# Patient Record
Sex: FEMALE | Race: BLACK OR AFRICAN AMERICAN | NOT HISPANIC OR LATINO | Employment: OTHER | URBAN - NONMETROPOLITAN AREA
[De-identification: names, ages, dates, MRNs, and addresses within clinical notes are randomized per-mention and may not be internally consistent; named-entity substitution may affect disease eponyms.]

---

## 2018-02-07 ENCOUNTER — TELEPHONE (OUTPATIENT)
Dept: NEUROSURGERY | Facility: CLINIC | Age: 68
End: 2018-02-07

## 2018-02-07 NOTE — TELEPHONE ENCOUNTER
Patient's son called stating his mom had fallen, broken her hip, was admitted to [facility], and was wanting Dr Phillips to admit her to [facility].  I called him back and told him he would need to discuss w/her attending doctor there about what the next step would be to get her hip taken care of and that Dr Phillips wouldn't accept her in transfer for a broken hip and she would need to talk to her doctor there.  He agreed.    bessie buchanan CMA

## 2018-08-29 ENCOUNTER — LAB REQUISITION (OUTPATIENT)
Dept: LAB | Facility: HOSPITAL | Age: 68
End: 2018-08-29

## 2018-08-29 DIAGNOSIS — Z00.00 ROUTINE GENERAL MEDICAL EXAMINATION AT A HEALTH CARE FACILITY: ICD-10-CM

## 2018-08-29 LAB — AMMONIA BLD-SCNC: 22 UMOL/L (ref 9–33)

## 2018-08-29 PROCEDURE — 82140 ASSAY OF AMMONIA: CPT | Performed by: EMERGENCY MEDICINE

## 2019-01-01 ENCOUNTER — APPOINTMENT (OUTPATIENT)
Dept: GENERAL RADIOLOGY | Age: 69
DRG: 215 | End: 2019-01-01
Payer: MEDICARE

## 2019-01-01 ENCOUNTER — HOSPITAL ENCOUNTER (INPATIENT)
Age: 69
LOS: 1 days | DRG: 215 | End: 2019-08-08
Attending: EMERGENCY MEDICINE | Admitting: INTERNAL MEDICINE
Payer: MEDICARE

## 2019-01-01 ENCOUNTER — APPOINTMENT (OUTPATIENT)
Dept: CT IMAGING | Age: 69
DRG: 215 | End: 2019-01-01
Payer: MEDICARE

## 2019-01-01 DIAGNOSIS — I21.3 ST ELEVATION MYOCARDIAL INFARCTION (STEMI), UNSPECIFIED ARTERY (HCC): ICD-10-CM

## 2019-01-01 DIAGNOSIS — I46.9 CARDIAC ARREST (HCC): Primary | ICD-10-CM

## 2019-01-01 DIAGNOSIS — R79.89 ELEVATED SERUM CREATININE: ICD-10-CM

## 2019-01-01 DIAGNOSIS — E87.5 HYPERKALEMIA: ICD-10-CM

## 2019-01-01 DIAGNOSIS — Z87.19 HISTORY OF CIRRHOSIS: ICD-10-CM

## 2019-01-01 LAB
ABO/RH: NORMAL
ALBUMIN SERPL-MCNC: 1.7 G/DL (ref 3.5–5.2)
ALBUMIN SERPL-MCNC: 1.7 G/DL (ref 3.5–5.2)
ALBUMIN SERPL-MCNC: 2.8 G/DL (ref 3.5–5.2)
ALP BLD-CCNC: 153 U/L (ref 35–104)
ALP BLD-CCNC: 175 U/L (ref 35–104)
ALP BLD-CCNC: 323 U/L (ref 35–104)
ALT SERPL-CCNC: 51 U/L (ref 5–33)
ALT SERPL-CCNC: 56 U/L (ref 5–33)
ALT SERPL-CCNC: 83 U/L (ref 5–33)
ANION GAP SERPL CALCULATED.3IONS-SCNC: 11 MMOL/L (ref 7–19)
ANION GAP SERPL CALCULATED.3IONS-SCNC: 11 MMOL/L (ref 7–19)
ANION GAP SERPL CALCULATED.3IONS-SCNC: 17 MMOL/L (ref 7–19)
ANION GAP SERPL CALCULATED.3IONS-SCNC: 8 MMOL/L (ref 7–19)
ANTIBODY SCREEN: NORMAL
APTT: 61.7 SEC (ref 26–36.2)
APTT: 77.6 SEC (ref 26–36.2)
APTT: >200 SEC (ref 26–36.2)
APTT: >200 SEC (ref 26–36.2)
AST SERPL-CCNC: 150 U/L (ref 5–32)
AST SERPL-CCNC: 153 U/L (ref 5–32)
AST SERPL-CCNC: 185 U/L (ref 5–32)
BACTERIA: ABNORMAL /HPF
BASE EXCESS ARTERIAL: -12.4 MMOL/L (ref -2–2)
BASE EXCESS ARTERIAL: -9.9 MMOL/L (ref -2–2)
BASE EXCESS VENOUS: -8
BILIRUB SERPL-MCNC: 1.7 MG/DL (ref 0.2–1.2)
BILIRUB SERPL-MCNC: 2 MG/DL (ref 0.2–1.2)
BILIRUB SERPL-MCNC: 2.1 MG/DL (ref 0.2–1.2)
BILIRUBIN URINE: NEGATIVE
BLOOD BANK DISPENSE STATUS: NORMAL
BLOOD BANK PRODUCT CODE: NORMAL
BLOOD, URINE: ABNORMAL
BPU ID: NORMAL
BUN BLDV-MCNC: 22 MG/DL (ref 8–23)
BUN BLDV-MCNC: 26 MG/DL (ref 8–23)
BUN BLDV-MCNC: 28 MG/DL (ref 8–23)
BUN BLDV-MCNC: 28 MG/DL (ref 8–23)
CALCIUM IONIZED: 1.16 MMOL/L (ref 1.1–1.3)
CALCIUM SERPL-MCNC: 6.7 MG/DL (ref 8.8–10.2)
CALCIUM SERPL-MCNC: 7 MG/DL (ref 8.8–10.2)
CALCIUM SERPL-MCNC: 7.3 MG/DL (ref 8.8–10.2)
CALCIUM SERPL-MCNC: 9.3 MG/DL (ref 8.8–10.2)
CARBOXYHEMOGLOBIN ARTERIAL: 2 % (ref 0–5)
CARBOXYHEMOGLOBIN ARTERIAL: 2 % (ref 0–5)
CHLORIDE BLD-SCNC: 103 MMOL/L (ref 98–111)
CHLORIDE BLD-SCNC: 105 MMOL/L (ref 98–111)
CHLORIDE BLD-SCNC: 108 MMOL/L (ref 98–111)
CHLORIDE BLD-SCNC: 110 MMOL/L (ref 98–111)
CHOLESTEROL, TOTAL: 78 MG/DL (ref 160–199)
CLARITY: CLEAR
CO2: 13 MMOL/L (ref 22–29)
CO2: 16 MEQ/L (ref 21–32)
CO2: 17 MMOL/L (ref 22–29)
CO2: 18 MMOL/L (ref 22–29)
CO2: 21 MMOL/L (ref 22–29)
COLOR: YELLOW
CREAT SERPL-MCNC: 1.4 MG/DL (ref 0.5–0.9)
CREAT SERPL-MCNC: 1.4 MG/DL (ref 0.5–0.9)
CREAT SERPL-MCNC: 1.5 MG/DL (ref 0.5–0.9)
CREAT SERPL-MCNC: 1.5 MG/DL (ref 0.5–0.9)
CREATININE URINE: 24.1 MG/DL (ref 4.2–622)
D DIMER: 10.58 UG/ML FEU (ref 0–0.48)
DESCRIPTION BLOOD BANK: NORMAL
EKG P AXIS: 66 DEGREES
EKG P AXIS: 75 DEGREES
EKG P-R INTERVAL: 158 MS
EKG P-R INTERVAL: 192 MS
EKG Q-T INTERVAL: 304 MS
EKG Q-T INTERVAL: 440 MS
EKG QRS DURATION: 72 MS
EKG QRS DURATION: 94 MS
EKG QTC CALCULATION (BAZETT): 393 MS
EKG QTC CALCULATION (BAZETT): 468 MS
EKG T AXIS: 55 DEGREES
EKG T AXIS: 61 DEGREES
EPITHELIAL CELLS, UA: ABNORMAL /HPF
FIBRINOGEN: 137 MG/DL (ref 238–505)
GFR NON-AFRICAN AMERICAN: 30
GFR NON-AFRICAN AMERICAN: 34
GFR NON-AFRICAN AMERICAN: 34
GFR NON-AFRICAN AMERICAN: 37
GFR NON-AFRICAN AMERICAN: 37
GLUCOSE BLD-MCNC: 106 MG/DL (ref 70–99)
GLUCOSE BLD-MCNC: 127 MG/DL (ref 74–109)
GLUCOSE BLD-MCNC: 131 MG/DL (ref 70–99)
GLUCOSE BLD-MCNC: 151 MG/DL (ref 70–99)
GLUCOSE BLD-MCNC: 155 MG/DL (ref 70–99)
GLUCOSE BLD-MCNC: 178 MG/DL (ref 70–99)
GLUCOSE BLD-MCNC: 187 MG/DL (ref 70–99)
GLUCOSE BLD-MCNC: 193 MG/DL (ref 70–99)
GLUCOSE BLD-MCNC: 205 MG/DL (ref 74–109)
GLUCOSE BLD-MCNC: 209 MG/DL (ref 70–99)
GLUCOSE BLD-MCNC: 240 MG/DL (ref 74–109)
GLUCOSE BLD-MCNC: 247 MG/DL (ref 70–99)
GLUCOSE BLD-MCNC: 255 MG/DL (ref 70–99)
GLUCOSE BLD-MCNC: 290 MG/DL (ref 70–99)
GLUCOSE BLD-MCNC: 298 MG/DL (ref 70–99)
GLUCOSE BLD-MCNC: 303 MG/DL (ref 70–99)
GLUCOSE BLD-MCNC: 336 MG/DL (ref 74–109)
GLUCOSE BLD-MCNC: 343 MG/DL (ref 70–99)
GLUCOSE BLD-MCNC: 345 MG/DL (ref 70–99)
GLUCOSE BLD-MCNC: 412 MG/DL (ref 70–99)
GLUCOSE URINE: NEGATIVE MG/DL
HBA1C MFR BLD: 4.9 % (ref 4–6)
HCO3 ARTERIAL: 13.6 MMOL/L (ref 22–26)
HCO3 ARTERIAL: 14.2 MMOL/L (ref 22–26)
HCT VFR BLD CALC: 18.4 % (ref 37–47)
HCT VFR BLD CALC: 18.6 % (ref 37–47)
HCT VFR BLD CALC: 19.2 % (ref 37–47)
HCT VFR BLD CALC: 20.2 % (ref 37–47)
HCT VFR BLD CALC: 22.3 % (ref 37–47)
HCT VFR BLD CALC: 26.5 % (ref 37–47)
HCT VFR BLD CALC: 36.4 % (ref 37–47)
HDLC SERPL-MCNC: 25 MG/DL (ref 65–121)
HEMOGLOBIN, ART, EXTENDED: 11.7 G/DL (ref 12–16)
HEMOGLOBIN, ART, EXTENDED: 7 G/DL (ref 12–16)
HEMOGLOBIN: 11 GM/DL (ref 12–18)
HEMOGLOBIN: 11.5 G/DL (ref 12–16)
HEMOGLOBIN: 6 G/DL (ref 12–16)
HEMOGLOBIN: 6.3 G/DL (ref 12–16)
HEMOGLOBIN: 6.3 G/DL (ref 12–16)
HEMOGLOBIN: 7 G/DL (ref 12–16)
HEMOGLOBIN: 7.5 G/DL (ref 12–16)
HEMOGLOBIN: 8.9 G/DL (ref 12–16)
INR BLD: 1.61 (ref 0.88–1.18)
KETONES, URINE: NEGATIVE MG/DL
LACTATE DEHYDROGENASE: 455 U/L (ref 91–215)
LACTIC ACID: 2.8 MMOL/L (ref 0.5–1.9)
LACTIC ACID: 2.9 MMOL/L (ref 0.5–1.9)
LACTIC ACID: 3.4 MMOL/L (ref 0.5–1.9)
LACTIC ACID: 5.2 MMOL/L (ref 0.5–1.9)
LDL CHOLESTEROL CALCULATED: 40 MG/DL
LEUKOCYTE ESTERASE, URINE: ABNORMAL
LIPASE: 92 U/L (ref 13–60)
LV EF: 20 %
LVEF MODALITY: NORMAL
MAGNESIUM: 2 MG/DL (ref 1.6–2.4)
MCH RBC QN AUTO: 29.6 PG (ref 27–31)
MCH RBC QN AUTO: 30.5 PG (ref 27–31)
MCH RBC QN AUTO: 30.7 PG (ref 27–31)
MCH RBC QN AUTO: 30.7 PG (ref 27–31)
MCHC RBC AUTO-ENTMCNC: 31.6 G/DL (ref 33–37)
MCHC RBC AUTO-ENTMCNC: 32.3 G/DL (ref 33–37)
MCHC RBC AUTO-ENTMCNC: 32.8 G/DL (ref 33–37)
MCHC RBC AUTO-ENTMCNC: 33.6 G/DL (ref 33–37)
MCV RBC AUTO: 91.4 FL (ref 81–99)
MCV RBC AUTO: 93.6 FL (ref 81–99)
MCV RBC AUTO: 93.7 FL (ref 81–99)
MCV RBC AUTO: 94.4 FL (ref 81–99)
METHEMOGLOBIN ARTERIAL: 1 %
METHEMOGLOBIN ARTERIAL: 1.5 %
NITRITE, URINE: NEGATIVE
O2 CONTENT ARTERIAL: 10.3 ML/DL
O2 CONTENT ARTERIAL: 16.8 ML/DL
O2 SAT, ARTERIAL: 97.4 %
O2 SAT, ARTERIAL: 97.9 %
O2 SAT, VEN: 80 %
O2 THERAPY: ABNORMAL
O2 THERAPY: ABNORMAL
OCCULT BLOOD DIAGNOSTIC: NORMAL
OCCULT BLOOD QC: NORMAL
PCO2 ARTERIAL: 24 MMHG (ref 35–45)
PCO2 ARTERIAL: 31 MMHG (ref 35–45)
PCO2, VEN: 27.7 MM HG (ref 40–50)
PDW BLD-RTO: 14.6 % (ref 11.5–14.5)
PDW BLD-RTO: 15.4 % (ref 11.5–14.5)
PDW BLD-RTO: 15.5 % (ref 11.5–14.5)
PDW BLD-RTO: 15.8 % (ref 11.5–14.5)
PERFORMED ON: ABNORMAL
PH ARTERIAL: 7.25 (ref 7.35–7.45)
PH ARTERIAL: 7.38 (ref 7.35–7.45)
PH UA: 7 (ref 5–8)
PH VENOUS: 7.38
PLATELET # BLD: 181 K/UL (ref 130–400)
PLATELET # BLD: 68 K/UL (ref 130–400)
PLATELET # BLD: 75 K/UL (ref 130–400)
PLATELET # BLD: 86 K/UL (ref 130–400)
PLATELET # BLD: 87 K/UL (ref 130–400)
PMV BLD AUTO: 11.9 FL (ref 9.4–12.3)
PMV BLD AUTO: 12 FL (ref 9.4–12.3)
PMV BLD AUTO: 12.3 FL (ref 9.4–12.3)
PMV BLD AUTO: 12.7 FL (ref 9.4–12.3)
PO2 ARTERIAL: 260 MMHG (ref 80–100)
PO2 ARTERIAL: 265 MMHG (ref 80–100)
PO2, VEN: 44.5 MM HG
POC ACT LR: 198 SEC
POC ACT LR: 200 SEC
POC ACT LR: 205 SEC
POC ACT LR: 213 SEC
POC ACT LR: 218 SEC
POC ACT LR: 223 SEC
POC ACT LR: 225 SEC
POC ACT LR: 239 SEC
POC ACT LR: 240 SEC
POC ACT LR: 240 SEC
POC ACT LR: 242 SEC
POC ACT LR: 244 SEC
POC ACT LR: 250 SEC
POC ACT LR: 258 SEC
POC ACT LR: 290 SEC
POC ACT LR: 306 SEC
POC ACT LR: 323 SEC
POC ACT LR: 382 SEC
POC ACT LR: 396 SEC
POC ANION GAP: 10
POC CHLORIDE: 107 MEQ/L (ref 99–110)
POC CREATININE: 1.7 MG/DL (ref 0.3–1.3)
POC HEMATOCRIT: 32 % (ref 37–52)
POC POTASSIUM: 6.7 MEQ/L (ref 3.5–5.1)
POC SAMPLE TYPE: ABNORMAL
POC SODIUM: 133 MEQ/L (ref 136–145)
POTASSIUM SERPL-SCNC: 3.9 MMOL/L (ref 3.5–5)
POTASSIUM SERPL-SCNC: 4.2 MMOL/L (ref 3.5–5)
POTASSIUM SERPL-SCNC: 5.8 MMOL/L (ref 3.5–5)
POTASSIUM SERPL-SCNC: 5.8 MMOL/L (ref 3.5–5)
POTASSIUM, WHOLE BLOOD: 5.5
POTASSIUM, WHOLE BLOOD: 6.3
PROTEIN PROTEIN: 245 MG/DL (ref 15–45)
PROTEIN UA: 100 MG/DL
PROTHROMBIN TIME: 18.4 SEC (ref 12–14.6)
RBC # BLD: 1.97 M/UL (ref 4.2–5.4)
RBC # BLD: 2.05 M/UL (ref 4.2–5.4)
RBC # BLD: 2.9 M/UL (ref 4.2–5.4)
RBC # BLD: 3.89 M/UL (ref 4.2–5.4)
RBC UA: ABNORMAL /HPF (ref 0–2)
SODIUM BLD-SCNC: 133 MMOL/L (ref 136–145)
SODIUM BLD-SCNC: 133 MMOL/L (ref 136–145)
SODIUM BLD-SCNC: 137 MMOL/L (ref 136–145)
SODIUM BLD-SCNC: 139 MMOL/L (ref 136–145)
SODIUM URINE: 78 MMOL/L
SPECIFIC GRAVITY UA: 1.03 (ref 1–1.03)
TCO2 CALC VENOUS: 17 MMOL/L
TOTAL PROTEIN: 4.3 G/DL (ref 6.6–8.7)
TOTAL PROTEIN: 4.4 G/DL (ref 6.6–8.7)
TOTAL PROTEIN: 7.5 G/DL (ref 6.6–8.7)
TRIGL SERPL-MCNC: 66 MG/DL (ref 0–149)
TROPONIN: 0.01 NG/ML (ref 0–0.03)
TROPONIN: 0.39 NG/ML (ref 0–0.03)
TROPONIN: 0.49 NG/ML (ref 0–0.03)
TROPONIN: 0.7 NG/ML (ref 0–0.03)
UROBILINOGEN, URINE: 0.2 E.U./DL
WBC # BLD: 12 K/UL (ref 4.8–10.8)
WBC # BLD: 30.9 K/UL (ref 4.8–10.8)
WBC # BLD: 31.2 K/UL (ref 4.8–10.8)
WBC # BLD: 32.7 K/UL (ref 4.8–10.8)
WBC UA: ABNORMAL /HPF (ref 0–5)

## 2019-01-01 PROCEDURE — 2580000003 HC RX 258: Performed by: EMERGENCY MEDICINE

## 2019-01-01 PROCEDURE — 36600 WITHDRAWAL OF ARTERIAL BLOOD: CPT

## 2019-01-01 PROCEDURE — 86850 RBC ANTIBODY SCREEN: CPT

## 2019-01-01 PROCEDURE — 93308 TTE F-UP OR LMTD: CPT

## 2019-01-01 PROCEDURE — C1876 STENT, NON-COA/NON-COV W/DEL: HCPCS

## 2019-01-01 PROCEDURE — 6360000002 HC RX W HCPCS: Performed by: INTERNAL MEDICINE

## 2019-01-01 PROCEDURE — 86900 BLOOD TYPING SEROLOGIC ABO: CPT

## 2019-01-01 PROCEDURE — 0BH17EZ INSERTION OF ENDOTRACHEAL AIRWAY INTO TRACHEA, VIA NATURAL OR ARTIFICIAL OPENING: ICD-10-PCS | Performed by: INTERNAL MEDICINE

## 2019-01-01 PROCEDURE — 93460 R&L HRT ART/VENTRICLE ANGIO: CPT | Performed by: INTERNAL MEDICINE

## 2019-01-01 PROCEDURE — 6370000000 HC RX 637 (ALT 250 FOR IP): Performed by: INTERNAL MEDICINE

## 2019-01-01 PROCEDURE — 85384 FIBRINOGEN ACTIVITY: CPT

## 2019-01-01 PROCEDURE — 36592 COLLECT BLOOD FROM PICC: CPT

## 2019-01-01 PROCEDURE — C1769 GUIDE WIRE: HCPCS

## 2019-01-01 PROCEDURE — B2111ZZ FLUOROSCOPY OF MULTIPLE CORONARY ARTERIES USING LOW OSMOLAR CONTRAST: ICD-10-PCS | Performed by: INTERNAL MEDICINE

## 2019-01-01 PROCEDURE — 36430 TRANSFUSION BLD/BLD COMPNT: CPT

## 2019-01-01 PROCEDURE — 92941 PRQ TRLML REVSC TOT OCCL AMI: CPT | Performed by: INTERNAL MEDICINE

## 2019-01-01 PROCEDURE — 85379 FIBRIN DEGRADATION QUANT: CPT

## 2019-01-01 PROCEDURE — 2500000003 HC RX 250 WO HCPCS: Performed by: INTERNAL MEDICINE

## 2019-01-01 PROCEDURE — 85347 COAGULATION TIME ACTIVATED: CPT

## 2019-01-01 PROCEDURE — 84295 ASSAY OF SERUM SODIUM: CPT

## 2019-01-01 PROCEDURE — 82948 REAGENT STRIP/BLOOD GLUCOSE: CPT

## 2019-01-01 PROCEDURE — 99223 1ST HOSP IP/OBS HIGH 75: CPT | Performed by: INTERNAL MEDICINE

## 2019-01-01 PROCEDURE — 99153 MOD SED SAME PHYS/QHP EA: CPT | Performed by: INTERNAL MEDICINE

## 2019-01-01 PROCEDURE — 80061 LIPID PANEL: CPT

## 2019-01-01 PROCEDURE — 85018 HEMOGLOBIN: CPT

## 2019-01-01 PROCEDURE — 33990 INSJ PERQ VAD L HRT ARTERIAL: CPT | Performed by: INTERNAL MEDICINE

## 2019-01-01 PROCEDURE — 87040 BLOOD CULTURE FOR BACTERIA: CPT

## 2019-01-01 PROCEDURE — 80053 COMPREHEN METABOLIC PANEL: CPT

## 2019-01-01 PROCEDURE — 2580000003 HC RX 258: Performed by: INTERNAL MEDICINE

## 2019-01-01 PROCEDURE — 71045 X-RAY EXAM CHEST 1 VIEW: CPT

## 2019-01-01 PROCEDURE — 6360000002 HC RX W HCPCS

## 2019-01-01 PROCEDURE — C1887 CATHETER, GUIDING: HCPCS

## 2019-01-01 PROCEDURE — 84300 ASSAY OF URINE SODIUM: CPT

## 2019-01-01 PROCEDURE — 5A0221D ASSISTANCE WITH CARDIAC OUTPUT USING IMPELLER PUMP, CONTINUOUS: ICD-10-PCS | Performed by: INTERNAL MEDICINE

## 2019-01-01 PROCEDURE — 36415 COLL VENOUS BLD VENIPUNCTURE: CPT

## 2019-01-01 PROCEDURE — 85610 PROTHROMBIN TIME: CPT

## 2019-01-01 PROCEDURE — P9016 RBC LEUKOCYTES REDUCED: HCPCS

## 2019-01-01 PROCEDURE — 94002 VENT MGMT INPAT INIT DAY: CPT

## 2019-01-01 PROCEDURE — 83735 ASSAY OF MAGNESIUM: CPT

## 2019-01-01 PROCEDURE — 93005 ELECTROCARDIOGRAM TRACING: CPT

## 2019-01-01 PROCEDURE — 85027 COMPLETE CBC AUTOMATED: CPT

## 2019-01-01 PROCEDURE — 82374 ASSAY BLOOD CARBON DIOXIDE: CPT

## 2019-01-01 PROCEDURE — B41F1ZZ FLUOROSCOPY OF RIGHT LOWER EXTREMITY ARTERIES USING LOW OSMOLAR CONTRAST: ICD-10-PCS | Performed by: INTERNAL MEDICINE

## 2019-01-01 PROCEDURE — C1894 INTRO/SHEATH, NON-LASER: HCPCS

## 2019-01-01 PROCEDURE — 99291 CRITICAL CARE FIRST HOUR: CPT | Performed by: INTERNAL MEDICINE

## 2019-01-01 PROCEDURE — 81001 URINALYSIS AUTO W/SCOPE: CPT

## 2019-01-01 PROCEDURE — 84484 ASSAY OF TROPONIN QUANT: CPT

## 2019-01-01 PROCEDURE — 6360000004 HC RX CONTRAST MEDICATION: Performed by: EMERGENCY MEDICINE

## 2019-01-01 PROCEDURE — 99291 CRITICAL CARE FIRST HOUR: CPT | Performed by: EMERGENCY MEDICINE

## 2019-01-01 PROCEDURE — 70450 CT HEAD/BRAIN W/O DYE: CPT

## 2019-01-01 PROCEDURE — 74018 RADEX ABDOMEN 1 VIEW: CPT

## 2019-01-01 PROCEDURE — 83690 ASSAY OF LIPASE: CPT

## 2019-01-01 PROCEDURE — 82810 BLOOD GASES O2 SAT ONLY: CPT

## 2019-01-01 PROCEDURE — 2709999900 HC NON-CHARGEABLE SUPPLY

## 2019-01-01 PROCEDURE — 2100000000 HC CCU R&B

## 2019-01-01 PROCEDURE — 71275 CT ANGIOGRAPHY CHEST: CPT

## 2019-01-01 PROCEDURE — 99231 SBSQ HOSP IP/OBS SF/LOW 25: CPT | Performed by: INTERNAL MEDICINE

## 2019-01-01 PROCEDURE — 02703EZ DILATION OF CORONARY ARTERY, ONE ARTERY WITH TWO INTRALUMINAL DEVICES, PERCUTANEOUS APPROACH: ICD-10-PCS | Performed by: INTERNAL MEDICINE

## 2019-01-01 PROCEDURE — 83036 HEMOGLOBIN GLYCOSYLATED A1C: CPT

## 2019-01-01 PROCEDURE — 82330 ASSAY OF CALCIUM: CPT

## 2019-01-01 PROCEDURE — 82800 BLOOD PH: CPT

## 2019-01-01 PROCEDURE — 99238 HOSP IP/OBS DSCHRG MGMT 30/<: CPT | Performed by: INTERNAL MEDICINE

## 2019-01-01 PROCEDURE — 85730 THROMBOPLASTIN TIME PARTIAL: CPT

## 2019-01-01 PROCEDURE — 06HM33Z INSERTION OF INFUSION DEVICE INTO RIGHT FEMORAL VEIN, PERCUTANEOUS APPROACH: ICD-10-PCS | Performed by: INTERNAL MEDICINE

## 2019-01-01 PROCEDURE — C9113 INJ PANTOPRAZOLE SODIUM, VIA: HCPCS | Performed by: INTERNAL MEDICINE

## 2019-01-01 PROCEDURE — 51702 INSERT TEMP BLADDER CATH: CPT

## 2019-01-01 PROCEDURE — 5A1945Z RESPIRATORY VENTILATION, 24-96 CONSECUTIVE HOURS: ICD-10-PCS | Performed by: INTERNAL MEDICINE

## 2019-01-01 PROCEDURE — 02HA3RZ INSERTION OF SHORT-TERM EXTERNAL HEART ASSIST SYSTEM INTO HEART, PERCUTANEOUS APPROACH: ICD-10-PCS | Performed by: INTERNAL MEDICINE

## 2019-01-01 PROCEDURE — 84132 ASSAY OF SERUM POTASSIUM: CPT

## 2019-01-01 PROCEDURE — 87449 NOS EACH ORGANISM AG IA: CPT

## 2019-01-01 PROCEDURE — 82565 ASSAY OF CREATININE: CPT

## 2019-01-01 PROCEDURE — 82435 ASSAY OF BLOOD CHLORIDE: CPT

## 2019-01-01 PROCEDURE — 99152 MOD SED SAME PHYS/QHP 5/>YRS: CPT | Performed by: INTERNAL MEDICINE

## 2019-01-01 PROCEDURE — 84156 ASSAY OF PROTEIN URINE: CPT

## 2019-01-01 PROCEDURE — 83605 ASSAY OF LACTIC ACID: CPT

## 2019-01-01 PROCEDURE — 85014 HEMATOCRIT: CPT

## 2019-01-01 PROCEDURE — 82803 BLOOD GASES ANY COMBINATION: CPT

## 2019-01-01 PROCEDURE — 4A023N7 MEASUREMENT OF CARDIAC SAMPLING AND PRESSURE, LEFT HEART, PERCUTANEOUS APPROACH: ICD-10-PCS | Performed by: INTERNAL MEDICINE

## 2019-01-01 PROCEDURE — B2151ZZ FLUOROSCOPY OF LEFT HEART USING LOW OSMOLAR CONTRAST: ICD-10-PCS | Performed by: INTERNAL MEDICINE

## 2019-01-01 PROCEDURE — 86923 COMPATIBILITY TEST ELECTRIC: CPT

## 2019-01-01 PROCEDURE — 86901 BLOOD TYPING SEROLOGIC RH(D): CPT

## 2019-01-01 PROCEDURE — 82272 OCCULT BLD FECES 1-3 TESTS: CPT

## 2019-01-01 PROCEDURE — 85049 AUTOMATED PLATELET COUNT: CPT

## 2019-01-01 PROCEDURE — 2700000000 HC OXYGEN THERAPY PER DAY

## 2019-01-01 PROCEDURE — 83615 LACTATE (LD) (LDH) ENZYME: CPT

## 2019-01-01 PROCEDURE — 2720000010 HC SURG SUPPLY STERILE

## 2019-01-01 PROCEDURE — 33993 REPOSG PERQ R/L HRT VAD: CPT | Performed by: INTERNAL MEDICINE

## 2019-01-01 PROCEDURE — 02WAXRZ REVISION OF SHORT-TERM EXTERNAL HEART ASSIST SYSTEM IN HEART, EXTERNAL APPROACH: ICD-10-PCS | Performed by: INTERNAL MEDICINE

## 2019-01-01 PROCEDURE — 82570 ASSAY OF URINE CREATININE: CPT

## 2019-01-01 PROCEDURE — 99291 CRITICAL CARE FIRST HOUR: CPT

## 2019-01-01 PROCEDURE — 87324 CLOSTRIDIUM AG IA: CPT

## 2019-01-01 RX ORDER — HEPARIN SODIUM 1000 [USP'U]/ML
30 INJECTION, SOLUTION INTRAVENOUS; SUBCUTANEOUS PRN
Status: DISCONTINUED | OUTPATIENT
Start: 2019-01-01 | End: 2019-08-09 | Stop reason: HOSPADM

## 2019-01-01 RX ORDER — CHLORHEXIDINE GLUCONATE 0.12 MG/ML
15 RINSE ORAL 2 TIMES DAILY
Status: DISCONTINUED | OUTPATIENT
Start: 2019-01-01 | End: 2019-01-01

## 2019-01-01 RX ORDER — ASPIRIN 81 MG/1
81 TABLET, CHEWABLE ORAL DAILY
Status: DISCONTINUED | OUTPATIENT
Start: 2019-01-01 | End: 2019-01-01

## 2019-01-01 RX ORDER — DEXTROSE MONOHYDRATE 50 MG/ML
INJECTION, SOLUTION INTRAVENOUS CONTINUOUS
Status: DISCONTINUED | OUTPATIENT
Start: 2019-01-01 | End: 2019-08-09 | Stop reason: HOSPADM

## 2019-01-01 RX ORDER — 0.9 % SODIUM CHLORIDE 0.9 %
500 INTRAVENOUS SOLUTION INTRAVENOUS ONCE
Status: DISCONTINUED | OUTPATIENT
Start: 2019-01-01 | End: 2019-08-09 | Stop reason: HOSPADM

## 2019-01-01 RX ORDER — IODIXANOL 320 MG/ML
100 INJECTION, SOLUTION INTRAVASCULAR
Status: COMPLETED | OUTPATIENT
Start: 2019-01-01 | End: 2019-01-01

## 2019-01-01 RX ORDER — 0.9 % SODIUM CHLORIDE 0.9 %
250 INTRAVENOUS SOLUTION INTRAVENOUS ONCE
Status: COMPLETED | OUTPATIENT
Start: 2019-01-01 | End: 2019-01-01

## 2019-01-01 RX ORDER — DEXTROSE MONOHYDRATE 25 G/50ML
12.5 INJECTION, SOLUTION INTRAVENOUS PRN
Status: DISCONTINUED | OUTPATIENT
Start: 2019-01-01 | End: 2019-08-09 | Stop reason: HOSPADM

## 2019-01-01 RX ORDER — PROPOFOL 10 MG/ML
10 INJECTION, EMULSION INTRAVENOUS ONCE
Status: DISCONTINUED | OUTPATIENT
Start: 2019-01-01 | End: 2019-01-01 | Stop reason: ALTCHOICE

## 2019-01-01 RX ORDER — HEPARIN SODIUM 10000 [USP'U]/100ML
12 INJECTION, SOLUTION INTRAVENOUS CONTINUOUS
Status: DISCONTINUED | OUTPATIENT
Start: 2019-01-01 | End: 2019-08-09 | Stop reason: HOSPADM

## 2019-01-01 RX ORDER — METOPROLOL TARTRATE 5 MG/5ML
5 INJECTION INTRAVENOUS EVERY 6 HOURS
Status: DISCONTINUED | OUTPATIENT
Start: 2019-01-01 | End: 2019-08-09 | Stop reason: HOSPADM

## 2019-01-01 RX ORDER — 0.9 % SODIUM CHLORIDE 0.9 %
250 INTRAVENOUS SOLUTION INTRAVENOUS ONCE
Status: DISCONTINUED | OUTPATIENT
Start: 2019-01-01 | End: 2019-08-09 | Stop reason: HOSPADM

## 2019-01-01 RX ORDER — PRASUGREL 10 MG/1
10 TABLET, FILM COATED ORAL DAILY
Status: DISCONTINUED | OUTPATIENT
Start: 2019-01-01 | End: 2019-01-01

## 2019-01-01 RX ORDER — ACETAMINOPHEN 325 MG/1
650 TABLET ORAL EVERY 4 HOURS PRN
Status: DISCONTINUED | OUTPATIENT
Start: 2019-01-01 | End: 2019-01-01 | Stop reason: SDUPTHER

## 2019-01-01 RX ORDER — ACETAMINOPHEN 325 MG/1
650 TABLET ORAL EVERY 4 HOURS PRN
Status: DISCONTINUED | OUTPATIENT
Start: 2019-01-01 | End: 2019-08-09 | Stop reason: HOSPADM

## 2019-01-01 RX ORDER — CALCITRIOL 1 UG/ML
1 INJECTION INTRAVENOUS DAILY
Status: DISCONTINUED | OUTPATIENT
Start: 2019-01-01 | End: 2019-08-09 | Stop reason: HOSPADM

## 2019-01-01 RX ORDER — NICOTINE POLACRILEX 4 MG
15 LOZENGE BUCCAL PRN
Status: DISCONTINUED | OUTPATIENT
Start: 2019-01-01 | End: 2019-01-01 | Stop reason: SDUPTHER

## 2019-01-01 RX ORDER — LIDOCAINE HYDROCHLORIDE AND EPINEPHRINE 20; 5 MG/ML; UG/ML
20 INJECTION, SOLUTION EPIDURAL; INFILTRATION; INTRACAUDAL; PERINEURAL ONCE
Status: COMPLETED | OUTPATIENT
Start: 2019-01-01 | End: 2019-01-01

## 2019-01-01 RX ORDER — ENALAPRILAT 2.5 MG/2ML
1.25 INJECTION INTRAVENOUS EVERY 6 HOURS SCHEDULED
Status: DISCONTINUED | OUTPATIENT
Start: 2019-01-01 | End: 2019-08-09 | Stop reason: HOSPADM

## 2019-01-01 RX ORDER — SODIUM CHLORIDE 9 MG/ML
INJECTION, SOLUTION INTRAVENOUS CONTINUOUS
Status: DISCONTINUED | OUTPATIENT
Start: 2019-01-01 | End: 2019-01-01

## 2019-01-01 RX ORDER — NICOTINE POLACRILEX 4 MG
15 LOZENGE BUCCAL PRN
Status: DISCONTINUED | OUTPATIENT
Start: 2019-01-01 | End: 2019-08-09 | Stop reason: HOSPADM

## 2019-01-01 RX ORDER — PROPOFOL 10 MG/ML
INJECTION, EMULSION INTRAVENOUS
Status: COMPLETED
Start: 2019-01-01 | End: 2019-01-01

## 2019-01-01 RX ORDER — ONDANSETRON 2 MG/ML
4 INJECTION INTRAMUSCULAR; INTRAVENOUS EVERY 6 HOURS PRN
Status: DISCONTINUED | OUTPATIENT
Start: 2019-01-01 | End: 2019-08-09 | Stop reason: HOSPADM

## 2019-01-01 RX ORDER — LORAZEPAM 2 MG/ML
2 INJECTION INTRAMUSCULAR ONCE
Status: COMPLETED | OUTPATIENT
Start: 2019-01-01 | End: 2019-01-01

## 2019-01-01 RX ORDER — HEPARIN SODIUM 1000 [USP'U]/ML
60 INJECTION, SOLUTION INTRAVENOUS; SUBCUTANEOUS PRN
Status: DISCONTINUED | OUTPATIENT
Start: 2019-01-01 | End: 2019-08-09 | Stop reason: HOSPADM

## 2019-01-01 RX ORDER — SODIUM CHLORIDE 0.9 % (FLUSH) 0.9 %
10 SYRINGE (ML) INJECTION EVERY 12 HOURS SCHEDULED
Status: DISCONTINUED | OUTPATIENT
Start: 2019-01-01 | End: 2019-08-09 | Stop reason: HOSPADM

## 2019-01-01 RX ORDER — DEXTROSE MONOHYDRATE 50 MG/ML
100 INJECTION, SOLUTION INTRAVENOUS PRN
Status: DISCONTINUED | OUTPATIENT
Start: 2019-01-01 | End: 2019-01-01 | Stop reason: SDUPTHER

## 2019-01-01 RX ORDER — HEPARIN SODIUM 1000 [USP'U]/ML
4000 INJECTION, SOLUTION INTRAVENOUS; SUBCUTANEOUS PRN
Status: DISCONTINUED | OUTPATIENT
Start: 2019-01-01 | End: 2019-01-01 | Stop reason: SDUPTHER

## 2019-01-01 RX ORDER — HEPARIN SODIUM 1000 [USP'U]/ML
2000 INJECTION, SOLUTION INTRAVENOUS; SUBCUTANEOUS PRN
Status: DISCONTINUED | OUTPATIENT
Start: 2019-01-01 | End: 2019-01-01 | Stop reason: SDUPTHER

## 2019-01-01 RX ORDER — SODIUM CHLORIDE 0.9 % (FLUSH) 0.9 %
10 SYRINGE (ML) INJECTION PRN
Status: DISCONTINUED | OUTPATIENT
Start: 2019-01-01 | End: 2019-08-09 | Stop reason: HOSPADM

## 2019-01-01 RX ORDER — DEXTROSE MONOHYDRATE 25 G/50ML
12.5 INJECTION, SOLUTION INTRAVENOUS PRN
Status: DISCONTINUED | OUTPATIENT
Start: 2019-01-01 | End: 2019-01-01 | Stop reason: SDUPTHER

## 2019-01-01 RX ORDER — PROPOFOL 10 MG/ML
20 INJECTION, EMULSION INTRAVENOUS
Status: DISCONTINUED | OUTPATIENT
Start: 2019-01-01 | End: 2019-08-09 | Stop reason: HOSPADM

## 2019-01-01 RX ORDER — DEXTROSE MONOHYDRATE 50 MG/ML
100 INJECTION, SOLUTION INTRAVENOUS PRN
Status: DISCONTINUED | OUTPATIENT
Start: 2019-01-01 | End: 2019-08-09 | Stop reason: HOSPADM

## 2019-01-01 RX ORDER — LORAZEPAM 2 MG/ML
INJECTION INTRAMUSCULAR
Status: COMPLETED
Start: 2019-01-01 | End: 2019-01-01

## 2019-01-01 RX ADMIN — Medication 10 ML: at 09:23

## 2019-01-01 RX ADMIN — PROPOFOL 20 MCG/KG/MIN: 10 INJECTION, EMULSION INTRAVENOUS at 18:03

## 2019-01-01 RX ADMIN — CEFEPIME HYDROCHLORIDE 2 G: 2 INJECTION, POWDER, FOR SOLUTION INTRAVENOUS at 08:35

## 2019-01-01 RX ADMIN — PROPOFOL 10 MCG/KG/MIN: 10 INJECTION, EMULSION INTRAVENOUS at 13:28

## 2019-01-01 RX ADMIN — PRASUGREL 10 MG: 10 TABLET, FILM COATED ORAL at 09:36

## 2019-01-01 RX ADMIN — SODIUM CHLORIDE 8 MG/HR: 9 INJECTION, SOLUTION INTRAVENOUS at 18:34

## 2019-01-01 RX ADMIN — SODIUM CHLORIDE 250 ML: 9 INJECTION, SOLUTION INTRAVENOUS at 21:00

## 2019-01-01 RX ADMIN — VANCOMYCIN HYDROCHLORIDE 1250 MG: 1 INJECTION, POWDER, LYOPHILIZED, FOR SOLUTION INTRAVENOUS at 08:38

## 2019-01-01 RX ADMIN — Medication 10 ML: at 21:00

## 2019-01-01 RX ADMIN — CHLORHEXIDINE GLUCONATE 15 ML: 1.2 RINSE ORAL at 21:39

## 2019-01-01 RX ADMIN — CHLORHEXIDINE GLUCONATE 15 ML: 1.2 RINSE ORAL at 09:22

## 2019-01-01 RX ADMIN — Medication: at 18:16

## 2019-01-01 RX ADMIN — FAMOTIDINE 20 MG: 10 INJECTION, SOLUTION INTRAVENOUS at 21:39

## 2019-01-01 RX ADMIN — ASPIRIN 81 MG 81 MG: 81 TABLET ORAL at 08:38

## 2019-01-01 RX ADMIN — SODIUM CHLORIDE 8.49 UNITS/HR: 9 INJECTION, SOLUTION INTRAVENOUS at 02:25

## 2019-01-01 RX ADMIN — LORAZEPAM 2 MG: 2 INJECTION INTRAMUSCULAR at 13:15

## 2019-01-01 RX ADMIN — ENALAPRILAT 1.25 MG: 2.5 INJECTION INTRAVENOUS at 11:59

## 2019-01-01 RX ADMIN — SODIUM CHLORIDE 250 ML: 9 INJECTION, SOLUTION INTRAVENOUS at 20:15

## 2019-01-01 RX ADMIN — SODIUM CHLORIDE 250 ML: 9 INJECTION, SOLUTION INTRAVENOUS at 03:07

## 2019-01-01 RX ADMIN — NOREPINEPHRINE BITARTRATE 5 MCG/MIN: 1 INJECTION, SOLUTION, CONCENTRATE INTRAVENOUS at 04:50

## 2019-01-01 RX ADMIN — INSULIN LISPRO 2 UNITS: 100 INJECTION, SOLUTION INTRAVENOUS; SUBCUTANEOUS at 22:10

## 2019-01-01 RX ADMIN — SODIUM CHLORIDE: 9 INJECTION, SOLUTION INTRAVENOUS at 14:13

## 2019-01-01 RX ADMIN — CALCITRIOL 1 MCG: 1 INJECTION INTRAVENOUS at 15:44

## 2019-01-01 RX ADMIN — PROPOFOL 15 MCG/KG/MIN: 10 INJECTION, EMULSION INTRAVENOUS at 16:42

## 2019-01-01 RX ADMIN — LORAZEPAM 2 MG: 2 INJECTION INTRAMUSCULAR; INTRAVENOUS at 13:15

## 2019-01-01 RX ADMIN — HEPARIN SODIUM 25000 UNITS: 5000 INJECTION, SOLUTION INTRAVENOUS; SUBCUTANEOUS at 18:03

## 2019-01-01 RX ADMIN — PROPOFOL 15 MCG/KG/MIN: 10 INJECTION, EMULSION INTRAVENOUS at 04:15

## 2019-01-01 RX ADMIN — IOPAMIDOL 90 ML: 755 INJECTION, SOLUTION INTRAVENOUS at 14:14

## 2019-01-01 RX ADMIN — DEXTROSE MONOHYDRATE: 50 INJECTION, SOLUTION INTRAVENOUS at 02:18

## 2019-01-01 RX ADMIN — Medication 10 ML: at 21:39

## 2019-01-01 RX ADMIN — METOPROLOL TARTRATE 5 MG: 5 INJECTION, SOLUTION INTRAVENOUS at 09:25

## 2019-01-01 RX ADMIN — IODIXANOL 156 ML: 320 INJECTION, SOLUTION INTRAVASCULAR at 16:03

## 2019-01-01 RX ADMIN — LIDOCAINE HYDROCHLORIDE,EPINEPHRINE BITARTRATE 20 ML: 20; .005 INJECTION, SOLUTION EPIDURAL; INFILTRATION; INTRACAUDAL; PERINEURAL at 18:19

## 2019-01-01 RX ADMIN — CEFEPIME HYDROCHLORIDE 2 G: 2 INJECTION, POWDER, FOR SOLUTION INTRAVENOUS at 20:16

## 2019-01-01 RX ADMIN — METOPROLOL TARTRATE 5 MG: 5 INJECTION, SOLUTION INTRAVENOUS at 15:15

## 2019-01-01 RX ADMIN — Medication: at 03:11

## 2019-01-01 ASSESSMENT — PULMONARY FUNCTION TESTS
PIF_VALUE: 33
PIF_VALUE: 33
PIF_VALUE: 35.3
PIF_VALUE: 34.1
PIF_VALUE: 32.2
PIF_VALUE: 31.5
PIF_VALUE: 30.4
PIF_VALUE: 32.5
PIF_VALUE: 38.5
PIF_VALUE: 34.6
PIF_VALUE: 36.1
PIF_VALUE: 34.1
PIF_VALUE: 32.9
PIF_VALUE: 36.4
PIF_VALUE: 32.4
PIF_VALUE: 34.6
PIF_VALUE: 36.7
PIF_VALUE: 32.6
PIF_VALUE: 34
PIF_VALUE: 30.6
PIF_VALUE: 37.6
PIF_VALUE: 32.4
PIF_VALUE: 41.7
PIF_VALUE: 35.6
PIF_VALUE: 32.1
PIF_VALUE: 39.8
PIF_VALUE: 9.6
PIF_VALUE: 32.8
PIF_VALUE: 31.4
PIF_VALUE: 33.3
PIF_VALUE: 33.4
PIF_VALUE: 33.4
PIF_VALUE: 34.9
PIF_VALUE: 32.4
PIF_VALUE: 34.6
PIF_VALUE: 31.7
PIF_VALUE: 32.9
PIF_VALUE: 38.3
PIF_VALUE: 35
PIF_VALUE: 35.3
PIF_VALUE: 33.1
PIF_VALUE: 9.6
PIF_VALUE: 36.4
PIF_VALUE: 36.9
PIF_VALUE: 38.7

## 2019-01-01 ASSESSMENT — PAIN SCALES - GENERAL
PAINLEVEL_OUTOF10: 0

## 2019-04-13 ENCOUNTER — APPOINTMENT (OUTPATIENT)
Dept: CT IMAGING | Facility: HOSPITAL | Age: 69
End: 2019-04-13

## 2019-04-13 ENCOUNTER — HOSPITAL ENCOUNTER (INPATIENT)
Facility: HOSPITAL | Age: 69
LOS: 1 days | Discharge: HOME HEALTH | End: 2019-04-17
Attending: EMERGENCY MEDICINE | Admitting: FAMILY MEDICINE

## 2019-04-13 DIAGNOSIS — I72.8 SPLENIC ARTERY ANEURYSM (HCC): ICD-10-CM

## 2019-04-13 DIAGNOSIS — N64.9 DISORDER OF BREAST: ICD-10-CM

## 2019-04-13 DIAGNOSIS — Z74.09 IMPAIRED FUNCTIONAL MOBILITY, BALANCE, GAIT, AND ENDURANCE: ICD-10-CM

## 2019-04-13 DIAGNOSIS — R07.9 CHEST PAIN, UNSPECIFIED TYPE: Primary | ICD-10-CM

## 2019-04-13 DIAGNOSIS — N63.10 BREAST MASS, RIGHT: ICD-10-CM

## 2019-04-13 LAB
ANION GAP SERPL CALCULATED.3IONS-SCNC: 2 MMOL/L (ref 4–13)
APTT PPP: 58.4 SECONDS (ref 24.1–35)
BACTERIA UR QL AUTO: ABNORMAL /HPF
BASOPHILS # BLD AUTO: 0.02 10*3/MM3 (ref 0–0.2)
BASOPHILS NFR BLD AUTO: 0.6 % (ref 0–2)
BILIRUB UR QL STRIP: NEGATIVE
BUN BLD-MCNC: 11 MG/DL (ref 5–21)
BUN/CREAT SERPL: 13.1 (ref 7–25)
CALCIUM SPEC-SCNC: 8.5 MG/DL (ref 8.4–10.4)
CHLORIDE SERPL-SCNC: 108 MMOL/L (ref 98–110)
CK SERPL-CCNC: 286 U/L (ref 0–203)
CLARITY UR: ABNORMAL
CO2 SERPL-SCNC: 29 MMOL/L (ref 24–31)
COLOR UR: YELLOW
CREAT BLD-MCNC: 0.84 MG/DL (ref 0.5–1.4)
D DIMER PPP FEU-MCNC: 2.31 MG/L (FEU) (ref 0–0.5)
DEPRECATED RDW RBC AUTO: 50.7 FL (ref 40–54)
EOSINOPHIL # BLD AUTO: 0.17 10*3/MM3 (ref 0–0.7)
EOSINOPHIL NFR BLD AUTO: 4.9 % (ref 0–4)
ERYTHROCYTE [DISTWIDTH] IN BLOOD BY AUTOMATED COUNT: 15.8 % (ref 12–15)
GFR SERPL CREATININE-BSD FRML MDRD: 82 ML/MIN/1.73
GLUCOSE BLD-MCNC: 98 MG/DL (ref 70–100)
GLUCOSE UR STRIP-MCNC: NEGATIVE MG/DL
HCT VFR BLD AUTO: 32.9 % (ref 37–47)
HGB BLD-MCNC: 10.8 G/DL (ref 12–16)
HGB UR QL STRIP.AUTO: ABNORMAL
HYALINE CASTS UR QL AUTO: ABNORMAL /LPF
IMM GRANULOCYTES # BLD AUTO: 0.01 10*3/MM3 (ref 0–0.05)
IMM GRANULOCYTES NFR BLD AUTO: 0.3 % (ref 0–5)
INR PPP: 1.51 (ref 0.91–1.09)
KETONES UR QL STRIP: NEGATIVE
LEUKOCYTE ESTERASE UR QL STRIP.AUTO: ABNORMAL
LYMPHOCYTES # BLD AUTO: 0.87 10*3/MM3 (ref 0.72–4.86)
LYMPHOCYTES NFR BLD AUTO: 25 % (ref 15–45)
MCH RBC QN AUTO: 29 PG (ref 28–32)
MCHC RBC AUTO-ENTMCNC: 32.8 G/DL (ref 33–36)
MCV RBC AUTO: 88.2 FL (ref 82–98)
MONOCYTES # BLD AUTO: 0.48 10*3/MM3 (ref 0.19–1.3)
MONOCYTES NFR BLD AUTO: 13.8 % (ref 4–12)
MYOGLOBIN SERPL-MCNC: 97.8 NG/ML (ref 0–110)
NEUTROPHILS # BLD AUTO: 1.93 10*3/MM3 (ref 1.87–8.4)
NEUTROPHILS NFR BLD AUTO: 55.4 % (ref 39–78)
NITRITE UR QL STRIP: NEGATIVE
NRBC BLD AUTO-RTO: 0 /100 WBC (ref 0–0)
NT-PROBNP SERPL-MCNC: 126 PG/ML (ref 0–900)
PH UR STRIP.AUTO: 6.5 [PH] (ref 5–8)
PLATELET # BLD AUTO: 78 10*3/MM3 (ref 130–400)
PMV BLD AUTO: 11.6 FL (ref 6–12)
POTASSIUM BLD-SCNC: 3.8 MMOL/L (ref 3.5–5.3)
PROT UR QL STRIP: NEGATIVE
PROTHROMBIN TIME: 18.6 SECONDS (ref 11.9–14.6)
RBC # BLD AUTO: 3.73 10*6/MM3 (ref 4.2–5.4)
RBC # UR: ABNORMAL /HPF
REF LAB TEST METHOD: ABNORMAL
SODIUM BLD-SCNC: 139 MMOL/L (ref 135–145)
SP GR UR STRIP: 1.01 (ref 1–1.03)
SQUAMOUS #/AREA URNS HPF: ABNORMAL /HPF
T4 FREE SERPL-MCNC: 1.4 NG/DL (ref 0.78–2.19)
TROPONIN I SERPL-MCNC: 0.02 NG/ML (ref 0–0.03)
TROPONIN I SERPL-MCNC: 0.03 NG/ML (ref 0–0.03)
TSH SERPL DL<=0.05 MIU/L-ACNC: 6.16 MIU/ML (ref 0.47–4.68)
UROBILINOGEN UR QL STRIP: ABNORMAL
WBC NRBC COR # BLD: 3.48 10*3/MM3 (ref 4.8–10.8)
WBC UR QL AUTO: ABNORMAL /HPF

## 2019-04-13 PROCEDURE — 0 IOPAMIDOL PER 1 ML: Performed by: EMERGENCY MEDICINE

## 2019-04-13 PROCEDURE — 84439 ASSAY OF FREE THYROXINE: CPT | Performed by: EMERGENCY MEDICINE

## 2019-04-13 PROCEDURE — 80048 BASIC METABOLIC PNL TOTAL CA: CPT | Performed by: EMERGENCY MEDICINE

## 2019-04-13 PROCEDURE — 81001 URINALYSIS AUTO W/SCOPE: CPT | Performed by: EMERGENCY MEDICINE

## 2019-04-13 PROCEDURE — 83880 ASSAY OF NATRIURETIC PEPTIDE: CPT | Performed by: INTERNAL MEDICINE

## 2019-04-13 PROCEDURE — 83880 ASSAY OF NATRIURETIC PEPTIDE: CPT | Performed by: EMERGENCY MEDICINE

## 2019-04-13 PROCEDURE — 85379 FIBRIN DEGRADATION QUANT: CPT | Performed by: EMERGENCY MEDICINE

## 2019-04-13 PROCEDURE — 71275 CT ANGIOGRAPHY CHEST: CPT

## 2019-04-13 PROCEDURE — 93010 ELECTROCARDIOGRAM REPORT: CPT | Performed by: INTERNAL MEDICINE

## 2019-04-13 PROCEDURE — 93005 ELECTROCARDIOGRAM TRACING: CPT | Performed by: INTERNAL MEDICINE

## 2019-04-13 PROCEDURE — 85610 PROTHROMBIN TIME: CPT | Performed by: EMERGENCY MEDICINE

## 2019-04-13 PROCEDURE — 99284 EMERGENCY DEPT VISIT MOD MDM: CPT

## 2019-04-13 PROCEDURE — G0378 HOSPITAL OBSERVATION PER HR: HCPCS

## 2019-04-13 PROCEDURE — 84443 ASSAY THYROID STIM HORMONE: CPT | Performed by: EMERGENCY MEDICINE

## 2019-04-13 PROCEDURE — 85025 COMPLETE CBC W/AUTO DIFF WBC: CPT | Performed by: EMERGENCY MEDICINE

## 2019-04-13 PROCEDURE — 83874 ASSAY OF MYOGLOBIN: CPT | Performed by: EMERGENCY MEDICINE

## 2019-04-13 PROCEDURE — 84484 ASSAY OF TROPONIN QUANT: CPT | Performed by: EMERGENCY MEDICINE

## 2019-04-13 PROCEDURE — 93005 ELECTROCARDIOGRAM TRACING: CPT | Performed by: EMERGENCY MEDICINE

## 2019-04-13 PROCEDURE — 80076 HEPATIC FUNCTION PANEL: CPT | Performed by: INTERNAL MEDICINE

## 2019-04-13 PROCEDURE — 83735 ASSAY OF MAGNESIUM: CPT | Performed by: INTERNAL MEDICINE

## 2019-04-13 PROCEDURE — 85730 THROMBOPLASTIN TIME PARTIAL: CPT | Performed by: EMERGENCY MEDICINE

## 2019-04-13 PROCEDURE — 94799 UNLISTED PULMONARY SVC/PX: CPT

## 2019-04-13 PROCEDURE — 94760 N-INVAS EAR/PLS OXIMETRY 1: CPT

## 2019-04-13 PROCEDURE — 82550 ASSAY OF CK (CPK): CPT | Performed by: EMERGENCY MEDICINE

## 2019-04-13 RX ORDER — NALOXONE HCL 0.4 MG/ML
0.4 VIAL (ML) INJECTION
Status: DISCONTINUED | OUTPATIENT
Start: 2019-04-13 | End: 2019-04-17 | Stop reason: HOSPADM

## 2019-04-13 RX ORDER — NITROGLYCERIN 0.4 MG/1
0.4 TABLET SUBLINGUAL
Status: DISCONTINUED | OUTPATIENT
Start: 2019-04-13 | End: 2019-04-17 | Stop reason: HOSPADM

## 2019-04-13 RX ORDER — NICOTINE POLACRILEX 4 MG
15 LOZENGE BUCCAL
Status: DISCONTINUED | OUTPATIENT
Start: 2019-04-13 | End: 2019-04-17 | Stop reason: HOSPADM

## 2019-04-13 RX ORDER — LEVOTHYROXINE SODIUM 0.07 MG/1
75 TABLET ORAL
Status: DISCONTINUED | OUTPATIENT
Start: 2019-04-14 | End: 2019-04-14

## 2019-04-13 RX ORDER — SODIUM CHLORIDE 0.9 % (FLUSH) 0.9 %
3-10 SYRINGE (ML) INJECTION AS NEEDED
Status: DISCONTINUED | OUTPATIENT
Start: 2019-04-13 | End: 2019-04-17 | Stop reason: HOSPADM

## 2019-04-13 RX ORDER — ASPIRIN 325 MG
325 TABLET ORAL DAILY
Status: DISCONTINUED | OUTPATIENT
Start: 2019-04-14 | End: 2019-04-17 | Stop reason: HOSPADM

## 2019-04-13 RX ORDER — INSULIN GLARGINE 100 [IU]/ML
10 INJECTION, SOLUTION SUBCUTANEOUS 2 TIMES DAILY
COMMUNITY

## 2019-04-13 RX ORDER — SODIUM CHLORIDE 0.9 % (FLUSH) 0.9 %
3 SYRINGE (ML) INJECTION EVERY 12 HOURS SCHEDULED
Status: DISCONTINUED | OUTPATIENT
Start: 2019-04-14 | End: 2019-04-17 | Stop reason: HOSPADM

## 2019-04-13 RX ORDER — FUROSEMIDE 40 MG/1
40 TABLET ORAL
Status: DISCONTINUED | OUTPATIENT
Start: 2019-04-14 | End: 2019-04-17 | Stop reason: HOSPADM

## 2019-04-13 RX ORDER — ONDANSETRON 2 MG/ML
4 INJECTION INTRAMUSCULAR; INTRAVENOUS EVERY 6 HOURS PRN
Status: DISCONTINUED | OUTPATIENT
Start: 2019-04-13 | End: 2019-04-15 | Stop reason: SDUPTHER

## 2019-04-13 RX ORDER — CLONIDINE HYDROCHLORIDE 0.1 MG/1
0.1 TABLET ORAL ONCE
COMMUNITY
End: 2019-06-24 | Stop reason: HOSPADM

## 2019-04-13 RX ORDER — FUROSEMIDE 40 MG/1
40 TABLET ORAL 2 TIMES DAILY
Status: ON HOLD | COMMUNITY
End: 2019-06-24 | Stop reason: SDUPTHER

## 2019-04-13 RX ORDER — ROSUVASTATIN CALCIUM 20 MG/1
20 TABLET, COATED ORAL NIGHTLY
Status: DISCONTINUED | OUTPATIENT
Start: 2019-04-14 | End: 2019-04-17 | Stop reason: HOSPADM

## 2019-04-13 RX ORDER — SODIUM CHLORIDE 9 MG/ML
100 INJECTION, SOLUTION INTRAVENOUS CONTINUOUS
Status: DISCONTINUED | OUTPATIENT
Start: 2019-04-14 | End: 2019-04-14

## 2019-04-13 RX ORDER — CARVEDILOL 3.12 MG/1
3.12 TABLET ORAL EVERY 12 HOURS SCHEDULED
Status: DISCONTINUED | OUTPATIENT
Start: 2019-04-14 | End: 2019-04-17 | Stop reason: HOSPADM

## 2019-04-13 RX ORDER — LEVOTHYROXINE SODIUM 0.07 MG/1
75 TABLET ORAL DAILY
COMMUNITY
End: 2019-04-17 | Stop reason: HOSPADM

## 2019-04-13 RX ORDER — CLONIDINE HYDROCHLORIDE 0.1 MG/1
0.1 TABLET ORAL EVERY 6 HOURS PRN
Status: DISCONTINUED | OUTPATIENT
Start: 2019-04-13 | End: 2019-04-17 | Stop reason: HOSPADM

## 2019-04-13 RX ORDER — ONDANSETRON 4 MG/1
4 TABLET, FILM COATED ORAL EVERY 6 HOURS PRN
Status: DISCONTINUED | OUTPATIENT
Start: 2019-04-13 | End: 2019-04-15 | Stop reason: SDUPTHER

## 2019-04-13 RX ORDER — LACTULOSE 10 G/15ML
20 SOLUTION ORAL 2 TIMES DAILY PRN
COMMUNITY
End: 2019-04-17 | Stop reason: HOSPADM

## 2019-04-13 RX ORDER — SODIUM CHLORIDE 0.9 % (FLUSH) 0.9 %
10 SYRINGE (ML) INJECTION AS NEEDED
Status: DISCONTINUED | OUTPATIENT
Start: 2019-04-13 | End: 2019-04-17 | Stop reason: HOSPADM

## 2019-04-13 RX ORDER — MORPHINE SULFATE 2 MG/ML
1 INJECTION, SOLUTION INTRAMUSCULAR; INTRAVENOUS EVERY 4 HOURS PRN
Status: DISCONTINUED | OUTPATIENT
Start: 2019-04-13 | End: 2019-04-17 | Stop reason: HOSPADM

## 2019-04-13 RX ORDER — SODIUM CHLORIDE AND POTASSIUM CHLORIDE 150; 900 MG/100ML; MG/100ML
100 INJECTION, SOLUTION INTRAVENOUS CONTINUOUS
Status: DISCONTINUED | OUTPATIENT
Start: 2019-04-14 | End: 2019-04-14

## 2019-04-13 RX ORDER — DEXTROSE MONOHYDRATE 25 G/50ML
25 INJECTION, SOLUTION INTRAVENOUS
Status: DISCONTINUED | OUTPATIENT
Start: 2019-04-13 | End: 2019-04-17 | Stop reason: HOSPADM

## 2019-04-13 RX ADMIN — IOPAMIDOL 150 ML: 755 INJECTION, SOLUTION INTRAVENOUS at 18:31

## 2019-04-13 NOTE — ED PROVIDER NOTES
Subjective   This is a 68-year-old female who has been transferred to this emergency department from [facility].  Patient presented there for several concerns.  She was transferred here given an abnormal troponin level and report of chest pain.  She was given aspirin/Lovenox and transported here.  Patient denies a known history of primary coronary artery disease.  Patient at this time has multiple symptoms and is an overall somewhat poor and unclear historian.  Patient describes onset of left side discomfort since Wednesday.  No fall or injury.  Pain is primarily to the left ribs.  She is unclear if she has been short of breath.  She thinks that she may have had a recent cough.  She does not know if she has been wheezing.  She does not know if she is dizzy.  No current palpitations.  No abdominal pain.  She denies any urine changes but does have a Mcdonald catheter in place.  No stool changes.  She has a history of chronic lower extremity edema but she is unsure if it is worse than normal.  No specific calf pain.  She does describe fatigue with exertion.  She does not know if she has had a fever.  She denies a known prior history of coronary artery disease.  Review of systems otherwise negative.  She has no additional complaints.            Review of Systems   All other systems reviewed and are negative.      Past Medical History:   Diagnosis Date   • Diabetes mellitus (CMS/HCC)    • Disease of thyroid gland    • Hypertension    • Liver cirrhosis (CMS/HCC)        No Known Allergies    Past Surgical History:   Procedure Laterality Date   • HIP ARTHROPLASTY         History reviewed. No pertinent family history.    Social History     Socioeconomic History   • Marital status:      Spouse name: Not on file   • Number of children: Not on file   • Years of education: Not on file   • Highest education level: Not on file   Tobacco Use   • Smoking status: Never Smoker   Substance and Sexual  Activity   • Drug use: No   • Sexual activity: Defer           Objective   Physical Exam   Constitutional: She appears well-developed and well-nourished.   Eyes: EOM are normal. Pupils are equal, round, and reactive to light.   Neck: Normal range of motion. Neck supple. No JVD present. No tracheal deviation present.   Cardiovascular: Normal rate, regular rhythm and normal heart sounds.   Pulmonary/Chest: Effort normal and breath sounds normal. No respiratory distress. She has no wheezes. She exhibits tenderness (Palpation of the left chest wall reproduces discomfort).   Abdominal: Soft. Bowel sounds are normal. She exhibits no distension. There is no tenderness. There is no guarding.   Musculoskeletal: She exhibits edema. She exhibits no deformity.   Patient has chronic appearing bilateral lower extremity edema   Neurological: She is alert.   Skin: Skin is warm and dry. Capillary refill takes less than 2 seconds.   Psychiatric: She has a normal mood and affect. Her behavior is normal.   Nursing note and vitals reviewed.      Procedures           ED Course      EKG at 1654 shows a sinus rhythm with a rate of 70 bpm.  Intervals within normal limits.  Normal axis.  R wave progression is maintained.  Suggested T wave inversions in lead III.  No ST elevation or evidence for acute infarction.    Transfer packet reviewed    Labs reviewed    CT added for abnormal dimer    CT Angiogram Chest With Contrast   Final Result   1. No evidence of pulmonary thromboembolic disease.   2. Bibasilar atelectasis. No evidence of lobar pneumonia or effusion.   3. There is a suspected right breast mass with asymmetric soft tissue   density within the breast. There is no enlarged right axillary node.   Correlation is recommended with the patient's physical exam. If not   recently undertaken follow-up with outpatient mammography would be   suggested.   4. Cirrhotic changes of the liver with splenomegaly and suspected upper   abdominal  varices. FINDINGS suggest portal hypertension. 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   This report was finalized on 04/13/2019 19:07 by Dr. Ace Moncada MD.        Patient updated on all results  She is chest pain free unless she moves or re-positions in the bed  She understands the plan of care for admission    I spoke with Dr. Linda who accepts admission          MDM  Patient was transferred here as an NSTEMI  Here our troponin is within normal range  She does have reproducible chest pain on palpation or when she moves  She is chest pain-free at rest  She already received aspirin/Lovenox  We will hold on further anticoagulation  Patient has no additional symptoms at this time  Stable vital signs  She understands the plan of care for admission for further cardiac evaluation    I spoke with Dr. Linda who accepts admission  Stable for floor      Final diagnoses:   Chest pain, unspecified type            Aaron Robbins, DO  04/13/19 1958

## 2019-04-14 ENCOUNTER — APPOINTMENT (OUTPATIENT)
Dept: CARDIOLOGY | Facility: HOSPITAL | Age: 69
End: 2019-04-14

## 2019-04-14 ENCOUNTER — APPOINTMENT (OUTPATIENT)
Dept: ULTRASOUND IMAGING | Facility: HOSPITAL | Age: 69
End: 2019-04-14

## 2019-04-14 PROBLEM — K74.60 LIVER CIRRHOSIS (HCC): Status: ACTIVE | Noted: 2019-04-14

## 2019-04-14 PROBLEM — D64.9 NORMOCYTIC ANEMIA: Status: ACTIVE | Noted: 2019-04-14

## 2019-04-14 PROBLEM — E11.9 DIABETES MELLITUS (HCC): Status: ACTIVE | Noted: 2019-04-14

## 2019-04-14 PROBLEM — R16.1 SPLENOMEGALY: Status: ACTIVE | Noted: 2019-04-14

## 2019-04-14 PROBLEM — I72.8 SPLENIC ARTERY ANEURYSM (HCC): Status: ACTIVE | Noted: 2019-04-14

## 2019-04-14 PROBLEM — R10.12 LEFT UPPER QUADRANT PAIN: Status: ACTIVE | Noted: 2019-04-14

## 2019-04-14 PROBLEM — R77.8 ELEVATED TROPONIN: Status: ACTIVE | Noted: 2019-04-14

## 2019-04-14 PROBLEM — E07.9 DISEASE OF THYROID GLAND: Status: ACTIVE | Noted: 2019-04-14

## 2019-04-14 LAB
ABO GROUP BLD: NORMAL
ALBUMIN SERPL-MCNC: 1.9 G/DL (ref 3.5–5)
ALBUMIN SERPL-MCNC: 2.3 G/DL (ref 3.5–5)
ALBUMIN/GLOB SERPL: 0.7 G/DL (ref 1.1–2.5)
ALP SERPL-CCNC: 249 U/L (ref 24–120)
ALP SERPL-CCNC: 312 U/L (ref 24–120)
ALT SERPL W P-5'-P-CCNC: 27 U/L (ref 0–54)
ALT SERPL W P-5'-P-CCNC: 30 U/L (ref 0–54)
AMMONIA BLD-SCNC: 137 UMOL/L (ref 9–33)
ANION GAP SERPL CALCULATED.3IONS-SCNC: 4 MMOL/L (ref 4–13)
ARTICHOKE IGE QN: 38 MG/DL (ref 0–99)
AST SERPL-CCNC: 47 U/L (ref 7–45)
AST SERPL-CCNC: 60 U/L (ref 7–45)
BASOPHILS # BLD AUTO: 0.02 10*3/MM3 (ref 0–0.2)
BASOPHILS NFR BLD AUTO: 0.6 % (ref 0–2)
BH CV ECHO MEAS - AO MAX PG (FULL): 8.2 MMHG
BH CV ECHO MEAS - AO MAX PG: 12.5 MMHG
BH CV ECHO MEAS - AO MEAN PG (FULL): 5.5 MMHG
BH CV ECHO MEAS - AO MEAN PG: 8.5 MMHG
BH CV ECHO MEAS - AO ROOT AREA (BSA CORRECTED): 1.5
BH CV ECHO MEAS - AO ROOT AREA: 6.2 CM^2
BH CV ECHO MEAS - AO ROOT DIAM: 2.8 CM
BH CV ECHO MEAS - AO V2 MAX: 177 CM/SEC
BH CV ECHO MEAS - AO V2 MEAN: 138.5 CM/SEC
BH CV ECHO MEAS - AO V2 VTI: 51.5 CM
BH CV ECHO MEAS - AVA(I,A): 1.5 CM^2
BH CV ECHO MEAS - AVA(I,D): 1.5 CM^2
BH CV ECHO MEAS - AVA(V,A): 1.7 CM^2
BH CV ECHO MEAS - AVA(V,D): 1.7 CM^2
BH CV ECHO MEAS - BSA(HAYCOCK): 2 M^2
BH CV ECHO MEAS - BSA: 1.9 M^2
BH CV ECHO MEAS - BZI_BMI: 30.6 KILOGRAMS/M^2
BH CV ECHO MEAS - BZI_METRIC_HEIGHT: 165.1 CM
BH CV ECHO MEAS - BZI_METRIC_WEIGHT: 83.5 KG
BH CV ECHO MEAS - EDV(CUBED): 48.6 ML
BH CV ECHO MEAS - EDV(MOD-SP4): 145 ML
BH CV ECHO MEAS - EDV(TEICH): 56.3 ML
BH CV ECHO MEAS - EF(CUBED): 66.7 %
BH CV ECHO MEAS - EF(MOD-SP4): 71.4 %
BH CV ECHO MEAS - EF(TEICH): 59.1 %
BH CV ECHO MEAS - ESV(CUBED): 16.2 ML
BH CV ECHO MEAS - ESV(MOD-SP4): 41.4 ML
BH CV ECHO MEAS - ESV(TEICH): 23 ML
BH CV ECHO MEAS - FS: 30.7 %
BH CV ECHO MEAS - IVS/LVPW: 0.87
BH CV ECHO MEAS - IVSD: 1.2 CM
BH CV ECHO MEAS - LA DIMENSION: 3.3 CM
BH CV ECHO MEAS - LA/AO: 1.2
BH CV ECHO MEAS - LAT PEAK E' VEL: 12.7 CM/SEC
BH CV ECHO MEAS - LV DIASTOLIC VOL/BSA (35-75): 75.9 ML/M^2
BH CV ECHO MEAS - LV MASS(C)D: 159.4 GRAMS
BH CV ECHO MEAS - LV MASS(C)DI: 83.5 GRAMS/M^2
BH CV ECHO MEAS - LV MAX PG: 4.3 MMHG
BH CV ECHO MEAS - LV MEAN PG: 3 MMHG
BH CV ECHO MEAS - LV SYSTOLIC VOL/BSA (12-30): 21.7 ML/M^2
BH CV ECHO MEAS - LV V1 MAX: 104 CM/SEC
BH CV ECHO MEAS - LV V1 MEAN: 82.3 CM/SEC
BH CV ECHO MEAS - LV V1 VTI: 27.1 CM
BH CV ECHO MEAS - LVIDD: 3.7 CM
BH CV ECHO MEAS - LVIDS: 2.5 CM
BH CV ECHO MEAS - LVLD AP4: 8.6 CM
BH CV ECHO MEAS - LVLS AP4: 6.1 CM
BH CV ECHO MEAS - LVOT AREA (M): 2.8 CM^2
BH CV ECHO MEAS - LVOT AREA: 2.8 CM^2
BH CV ECHO MEAS - LVOT DIAM: 1.9 CM
BH CV ECHO MEAS - LVPWD: 1.4 CM
BH CV ECHO MEAS - MED PEAK E' VEL: 8 CM/SEC
BH CV ECHO MEAS - MV A MAX VEL: 91.7 CM/SEC
BH CV ECHO MEAS - MV DEC SLOPE: 364 CM/SEC^2
BH CV ECHO MEAS - MV DEC TIME: 0.28 SEC
BH CV ECHO MEAS - MV E MAX VEL: 104 CM/SEC
BH CV ECHO MEAS - MV E/A: 1.1
BH CV ECHO MEAS - RAP SYSTOLE: 10 MMHG
BH CV ECHO MEAS - RVSP: 24.6 MMHG
BH CV ECHO MEAS - SI(AO): 166.1 ML/M^2
BH CV ECHO MEAS - SI(CUBED): 17 ML/M^2
BH CV ECHO MEAS - SI(LVOT): 40.2 ML/M^2
BH CV ECHO MEAS - SI(MOD-SP4): 54.3 ML/M^2
BH CV ECHO MEAS - SI(TEICH): 17.4 ML/M^2
BH CV ECHO MEAS - SV(AO): 317.1 ML
BH CV ECHO MEAS - SV(CUBED): 32.4 ML
BH CV ECHO MEAS - SV(LVOT): 76.8 ML
BH CV ECHO MEAS - SV(MOD-SP4): 103.6 ML
BH CV ECHO MEAS - SV(TEICH): 33.3 ML
BH CV ECHO MEAS - TR MAX VEL: 191 CM/SEC
BH CV ECHO MEASUREMENTS AVERAGE E/E' RATIO: 10.05
BILIRUB CONJ SERPL-MCNC: 0 MG/DL (ref 0–0.3)
BILIRUB INDIRECT SERPL-MCNC: 1.8 MG/DL (ref 0–1.1)
BILIRUB SERPL-MCNC: 2.1 MG/DL (ref 0.1–1)
BILIRUB SERPL-MCNC: 2.5 MG/DL (ref 0.1–1)
BLD GP AB SCN SERPL QL: NEGATIVE
BUN BLD-MCNC: 13 MG/DL (ref 5–21)
BUN/CREAT SERPL: 15.9 (ref 7–25)
CALCIUM SPEC-SCNC: 8.1 MG/DL (ref 8.4–10.4)
CHLORIDE SERPL-SCNC: 109 MMOL/L (ref 98–110)
CHOLEST SERPL-MCNC: 122 MG/DL (ref 130–200)
CO2 SERPL-SCNC: 26 MMOL/L (ref 24–31)
CREAT BLD-MCNC: 0.82 MG/DL (ref 0.5–1.4)
DEPRECATED RDW RBC AUTO: 50.4 FL (ref 40–54)
EOSINOPHIL # BLD AUTO: 0.13 10*3/MM3 (ref 0–0.7)
EOSINOPHIL NFR BLD AUTO: 4.1 % (ref 0–4)
ERYTHROCYTE [DISTWIDTH] IN BLOOD BY AUTOMATED COUNT: 15.8 % (ref 12–15)
FERRITIN SERPL-MCNC: 118 NG/ML (ref 11.1–264)
GFR SERPL CREATININE-BSD FRML MDRD: 84 ML/MIN/1.73
GLOBULIN UR ELPH-MCNC: 2.8 GM/DL
GLUCOSE BLD-MCNC: 128 MG/DL (ref 70–100)
GLUCOSE BLDC GLUCOMTR-MCNC: 100 MG/DL (ref 70–130)
GLUCOSE BLDC GLUCOMTR-MCNC: 105 MG/DL (ref 70–130)
GLUCOSE BLDC GLUCOMTR-MCNC: 107 MG/DL (ref 70–130)
GLUCOSE BLDC GLUCOMTR-MCNC: 135 MG/DL (ref 70–130)
HBA1C MFR BLD: 4.6 %
HCT VFR BLD AUTO: 28.4 % (ref 37–47)
HDLC SERPL-MCNC: 41 MG/DL
HGB BLD-MCNC: 9.5 G/DL (ref 12–16)
IMM GRANULOCYTES # BLD AUTO: 0.01 10*3/MM3 (ref 0–0.05)
IMM GRANULOCYTES NFR BLD AUTO: 0.3 % (ref 0–5)
IRON 24H UR-MRATE: 65 MCG/DL (ref 42–180)
IRON SATN MFR SERPL: 36 % (ref 20–45)
LDLC/HDLC SERPL: 1.73 {RATIO}
LYMPHOCYTES # BLD AUTO: 0.7 10*3/MM3 (ref 0.72–4.86)
LYMPHOCYTES NFR BLD AUTO: 22 % (ref 15–45)
MAGNESIUM SERPL-MCNC: 1.7 MG/DL (ref 1.4–2.2)
MAGNESIUM SERPL-MCNC: 1.7 MG/DL (ref 1.4–2.2)
MAXIMAL PREDICTED HEART RATE: 152 BPM
MCH RBC QN AUTO: 29.3 PG (ref 28–32)
MCHC RBC AUTO-ENTMCNC: 33.5 G/DL (ref 33–36)
MCV RBC AUTO: 87.7 FL (ref 82–98)
MONOCYTES # BLD AUTO: 0.41 10*3/MM3 (ref 0.19–1.3)
MONOCYTES NFR BLD AUTO: 12.9 % (ref 4–12)
NEUTROPHILS # BLD AUTO: 1.91 10*3/MM3 (ref 1.87–8.4)
NEUTROPHILS NFR BLD AUTO: 60.1 % (ref 39–78)
NRBC BLD AUTO-RTO: 0 /100 WBC (ref 0–0)
NT-PROBNP SERPL-MCNC: 125 PG/ML (ref 0–900)
PLATELET # BLD AUTO: 72 10*3/MM3 (ref 130–400)
PMV BLD AUTO: 12 FL (ref 6–12)
POTASSIUM BLD-SCNC: 3.9 MMOL/L (ref 3.5–5.3)
PROT SERPL-MCNC: 4.7 G/DL (ref 6.3–8.7)
PROT SERPL-MCNC: 5.7 G/DL (ref 6.3–8.7)
RBC # BLD AUTO: 3.24 10*6/MM3 (ref 4.2–5.4)
RH BLD: POSITIVE
SODIUM BLD-SCNC: 139 MMOL/L (ref 135–145)
STRESS TARGET HR: 129 BPM
T&S EXPIRATION DATE: NORMAL
T4 FREE SERPL-MCNC: 1.47 NG/DL (ref 0.78–2.19)
TIBC SERPL-MCNC: 180 MCG/DL (ref 225–420)
TRIGL SERPL-MCNC: 50 MG/DL (ref 0–149)
TROPONIN I SERPL-MCNC: 0.02 NG/ML (ref 0–0.03)
TROPONIN I SERPL-MCNC: 0.02 NG/ML (ref 0–0.03)
WBC NRBC COR # BLD: 3.18 10*3/MM3 (ref 4.8–10.8)

## 2019-04-14 PROCEDURE — 93005 ELECTROCARDIOGRAM TRACING: CPT | Performed by: INTERNAL MEDICINE

## 2019-04-14 PROCEDURE — 84484 ASSAY OF TROPONIN QUANT: CPT | Performed by: INTERNAL MEDICINE

## 2019-04-14 PROCEDURE — 80061 LIPID PANEL: CPT | Performed by: INTERNAL MEDICINE

## 2019-04-14 PROCEDURE — 93306 TTE W/DOPPLER COMPLETE: CPT

## 2019-04-14 PROCEDURE — 76700 US EXAM ABDOM COMPLETE: CPT

## 2019-04-14 PROCEDURE — 85025 COMPLETE CBC W/AUTO DIFF WBC: CPT | Performed by: INTERNAL MEDICINE

## 2019-04-14 PROCEDURE — 25010000002 ONDANSETRON PER 1 MG: Performed by: INTERNAL MEDICINE

## 2019-04-14 PROCEDURE — 82140 ASSAY OF AMMONIA: CPT | Performed by: INTERNAL MEDICINE

## 2019-04-14 PROCEDURE — 94760 N-INVAS EAR/PLS OXIMETRY 1: CPT

## 2019-04-14 PROCEDURE — 80053 COMPREHEN METABOLIC PANEL: CPT | Performed by: INTERNAL MEDICINE

## 2019-04-14 PROCEDURE — 94799 UNLISTED PULMONARY SVC/PX: CPT

## 2019-04-14 PROCEDURE — 99222 1ST HOSP IP/OBS MODERATE 55: CPT | Performed by: INTERNAL MEDICINE

## 2019-04-14 PROCEDURE — 82728 ASSAY OF FERRITIN: CPT | Performed by: NURSE PRACTITIONER

## 2019-04-14 PROCEDURE — 84439 ASSAY OF FREE THYROXINE: CPT | Performed by: INTERNAL MEDICINE

## 2019-04-14 PROCEDURE — 83036 HEMOGLOBIN GLYCOSYLATED A1C: CPT | Performed by: INTERNAL MEDICINE

## 2019-04-14 PROCEDURE — 86900 BLOOD TYPING SEROLOGIC ABO: CPT | Performed by: SURGERY

## 2019-04-14 PROCEDURE — 86901 BLOOD TYPING SEROLOGIC RH(D): CPT | Performed by: SURGERY

## 2019-04-14 PROCEDURE — 25010000002 PERFLUTREN 6.52 MG/ML SUSPENSION: Performed by: FAMILY MEDICINE

## 2019-04-14 PROCEDURE — 83735 ASSAY OF MAGNESIUM: CPT | Performed by: INTERNAL MEDICINE

## 2019-04-14 PROCEDURE — G0378 HOSPITAL OBSERVATION PER HR: HCPCS

## 2019-04-14 PROCEDURE — 93306 TTE W/DOPPLER COMPLETE: CPT | Performed by: INTERNAL MEDICINE

## 2019-04-14 PROCEDURE — 99220 PR INITIAL OBSERVATION CARE/DAY 70 MINUTES: CPT | Performed by: SURGERY

## 2019-04-14 PROCEDURE — 83550 IRON BINDING TEST: CPT | Performed by: NURSE PRACTITIONER

## 2019-04-14 PROCEDURE — 86850 RBC ANTIBODY SCREEN: CPT | Performed by: SURGERY

## 2019-04-14 PROCEDURE — 25010000002 MORPHINE PER 10 MG: Performed by: INTERNAL MEDICINE

## 2019-04-14 PROCEDURE — 82962 GLUCOSE BLOOD TEST: CPT

## 2019-04-14 PROCEDURE — 93010 ELECTROCARDIOGRAM REPORT: CPT | Performed by: INTERNAL MEDICINE

## 2019-04-14 PROCEDURE — 83540 ASSAY OF IRON: CPT | Performed by: NURSE PRACTITIONER

## 2019-04-14 RX ORDER — SODIUM CHLORIDE 450 MG/100ML
50 INJECTION, SOLUTION INTRAVENOUS CONTINUOUS
Status: DISCONTINUED | OUTPATIENT
Start: 2019-04-14 | End: 2019-04-15

## 2019-04-14 RX ORDER — LEVOTHYROXINE SODIUM 88 UG/1
88 TABLET ORAL
Status: DISCONTINUED | OUTPATIENT
Start: 2019-04-14 | End: 2019-04-17 | Stop reason: HOSPADM

## 2019-04-14 RX ORDER — LIDOCAINE 50 MG/G
2 PATCH TOPICAL
Status: DISCONTINUED | OUTPATIENT
Start: 2019-04-14 | End: 2019-04-16

## 2019-04-14 RX ORDER — LACTULOSE 20 G/30ML
20 SOLUTION ORAL 2 TIMES DAILY
Status: DISCONTINUED | OUTPATIENT
Start: 2019-04-14 | End: 2019-04-15

## 2019-04-14 RX ADMIN — CLONIDINE HYDROCHLORIDE 0.1 MG: 0.1 TABLET ORAL at 01:27

## 2019-04-14 RX ADMIN — ROSUVASTATIN CALCIUM 20 MG: 20 TABLET, FILM COATED ORAL at 01:27

## 2019-04-14 RX ADMIN — FUROSEMIDE 40 MG: 40 TABLET ORAL at 17:19

## 2019-04-14 RX ADMIN — SODIUM CHLORIDE, PRESERVATIVE FREE 3 ML: 5 INJECTION INTRAVENOUS at 01:27

## 2019-04-14 RX ADMIN — LACTULOSE 20 G: 20 SOLUTION ORAL at 10:05

## 2019-04-14 RX ADMIN — ONDANSETRON HYDROCHLORIDE 4 MG: 2 SOLUTION INTRAMUSCULAR; INTRAVENOUS at 01:44

## 2019-04-14 RX ADMIN — LACTULOSE 20 G: 20 SOLUTION ORAL at 21:27

## 2019-04-14 RX ADMIN — LEVOTHYROXINE SODIUM 88 MCG: 88 TABLET ORAL at 05:34

## 2019-04-14 RX ADMIN — CARVEDILOL 3.12 MG: 3.12 TABLET, FILM COATED ORAL at 21:27

## 2019-04-14 RX ADMIN — PERFLUTREN 8.48 MG: 6.52 INJECTION, SUSPENSION INTRAVENOUS at 09:39

## 2019-04-14 RX ADMIN — MORPHINE SULFATE 1 MG: 2 INJECTION, SOLUTION INTRAMUSCULAR; INTRAVENOUS at 01:38

## 2019-04-14 RX ADMIN — LIDOCAINE 2 PATCH: 50 PATCH CUTANEOUS at 14:17

## 2019-04-14 RX ADMIN — SODIUM CHLORIDE 50 ML/HR: 4.5 INJECTION, SOLUTION INTRAVENOUS at 18:31

## 2019-04-14 RX ADMIN — ROSUVASTATIN CALCIUM 20 MG: 20 TABLET, FILM COATED ORAL at 21:27

## 2019-04-14 RX ADMIN — FUROSEMIDE 40 MG: 40 TABLET ORAL at 10:05

## 2019-04-14 RX ADMIN — CARVEDILOL 3.12 MG: 3.12 TABLET, FILM COATED ORAL at 10:05

## 2019-04-14 RX ADMIN — CARVEDILOL 3.12 MG: 3.12 TABLET, FILM COATED ORAL at 01:27

## 2019-04-14 RX ADMIN — SODIUM CHLORIDE 100 ML/HR: 4.5 INJECTION, SOLUTION INTRAVENOUS at 01:28

## 2019-04-14 NOTE — PLAN OF CARE
Problem: Patient Care Overview  Goal: Plan of Care Review  Outcome: Ongoing (interventions implemented as appropriate)   04/14/19 0505   Coping/Psychosocial   Plan of Care Reviewed With patient   Plan of Care Review   Progress no change   OTHER   Outcome Summary Patient admitted for [facility] in [place], [place] for elevated troponin, c/o left lower chest pain, LUQ tender to touch, NPO for AM test, BLE edema noted, 1/2 NS @ 100 ml/hr infusing, up with assist, will continue to monitor for changes, family at bedside.       Problem: Fall Risk (Adult)  Goal: Identify Related Risk Factors and Signs and Symptoms  Outcome: Outcome(s) achieved Date Met: 04/14/19 04/14/19 0505   Fall Risk (Adult)   Related Risk Factors (Fall Risk) gait/mobility problems;history of falls   Signs and Symptoms (Fall Risk) presence of risk factors     Goal: Absence of Fall  Outcome: Ongoing (interventions implemented as appropriate)   04/14/19 0505   Fall Risk (Adult)   Absence of Fall achieves outcome       Problem: Cardiac: ACS (Acute Coronary Syndrome) (Adult)  Goal: Signs and Symptoms of Listed Potential Problems Will be Absent, Minimized or Managed (Cardiac: ACS)  Outcome: Ongoing (interventions implemented as appropriate)   04/14/19 0505   Goal/Outcome Evaluation   Problems Assessed (Acute Coronary Syndrome) all   Problems Present (Acute Coronary Syn) chest pain (angina);situational response

## 2019-04-14 NOTE — H&P
[facility]  HISTORY AND PHYSICAL    Date of Admission: 4/13/2019  Primary Care Physician: See Fritz DO    Subjective     Chief Complaint: Chest pain    History of Present Illness  Patient is 68-year-old -American female with past medical history significant for diabetes as well as cirrhosis undetermined etiology.  She complains of a 5-day history of left-sided chest wall pain and probably more left upper quadrant abdominal pain.  This started at rest and his continued in intensity and is slightly worse on Saturday when she presented to an outside facility.  At that time she had an elevated troponin other scale of 0.134 she was transferred here  And found to have a troponin of 0.023.  She also had a CT scan of the chest performed which shows no evidence of a PE bibasilar atelectasis no pneumonia patient has a questionable right breast mass however on physical exam I am unable to palpate anything.  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  She has been transferred here for further evaluation of chest pain I do not believe the ER doctor had a final report regarding his splenic artery aneurysm at the time he called me.  Patient also has cholelithiasis on CT scan.  She is tender to palpation up there and essentially it hurts to take a deep breath and has pain with range of motion.  She is very tender really up in the left upper abdominal and left chest wall area lower chest wall.  She denies cough fever congestion change in bowels change in appetite nausea vomiting hematemesis or melena.  She was given a dose of Lovenox and the outlying emergency room.  Will hold anticoagulants.      Review of Systems   14 point review of systems negative except for as per HPI    Otherwise complete ROS reviewed and negative except as mentioned in the  "HPI.    Past Medical History:   Past Medical History:   Diagnosis Date   • Diabetes mellitus (CMS/HCC)    • Disease of thyroid gland    • Hypertension    • Liver cirrhosis (CMS/HCC)      Past Surgical History:  Past Surgical History:   Procedure Laterality Date   • HIP ARTHROPLASTY       Social History:  reports that she has never smoked. She does not have any smokeless tobacco history on file. She reports that she does not use drugs.    Family History:   No history of cirrhosis    Allergies:  No Known Allergies  Medications:  Prior to Admission medications    Medication Sig Start Date End Date Taking? Authorizing Provider   CloNIDine (CATAPRES) 0.1 MG tablet Take 0.1 mg by mouth 1 (One) Time.   Yes Yudelka Donald MD   furosemide (LASIX) 40 MG tablet Take 40 mg by mouth 2 (Two) Times a Day.   Yes Yudelka Donald MD   insulin aspart (novoLOG) 100 UNIT/ML injection Inject  under the skin into the appropriate area as directed 3 (Three) Times a Day Before Meals.   Yes Yudelka Donald MD   insulin glargine (LANTUS) 100 UNIT/ML injection Inject 10 Units under the skin into the appropriate area as directed 2 (Two) Times a Day.   Yes Yudelka Donald MD   lactulose (CHRONULAC) 10 GM/15ML solution Take 20 g by mouth 2 (Two) Times a Day As Needed.   Yes Yudelka Donald MD   levothyroxine (SYNTHROID, LEVOTHROID) 75 MCG tablet Take 75 mcg by mouth Daily.   Yes Yudelka Donald MD   POTASSIUM CHLORIDE ER PO Take  by mouth.   Yes Yudelka Donald MD     Objective     Vital Signs: /66 (BP Location: Right arm, Patient Position: Lying)   Pulse 81   Temp 97.9 °F (36.6 °C) (Oral)   Resp 18   Ht 167.6 cm (65.98\")   Wt 83.6 kg (184 lb 5 oz)   SpO2 99%   BMI 29.76 kg/m²   Physical Exam  Gen. well-nourished well-developed -American female with vitiligo in no acute distress  HEENT: Atraumatic normocephalic pupils equal round reactive to light extraocular movements intact sclerae " anicteric TMs negative mucous membranes moist neck is supple without lymphadenopathy no JVD is noted no carotid bruits are auscultated oropharynx is without erythema or exudate appears to have a tumor on the left side of the face  Chest: Clear to auscultation percussion tenderness to palpation left lower chest wall in the lower rib area this extends down to the left upper quadrant of the abdomen she does have splenomegaly noted.    CV: Regular rate and rhythm normal S1-S2 no gallops murmurs or rubs  Abdomen: Soft tender left upper quadrant nondistended active bowel sounds no hepatomegaly moderate palpable spleen very tender no masses no hernias  Extremities: No clubbing edema or cyanosis capillary refill is normal pulses are 2+ and symmetric at radial and dorsalis pedis posterior tibial pulses are intact and 2+ palpable  Neuro: Patient is awake alert and oriented ×3, cranial nerves II through XII are grossly intact, motor is 5 out of 5 bilaterally, DTRs are 2+ and symmetric bilaterally sensory exam is nonfocal  Skin: Warm dry and intact no evidence of breakdown  Sensorium: Normal thought and affect  Nursing notes and vital signs reviewed        Results Reviewed:  Lab Results (last 24 hours)     Procedure Component Value Units Date/Time    Troponin [[id number]]  (Normal) Collected:  04/13/19 2037    Specimen:  Blood Updated:  04/13/19 2106     Troponin I 0.026 ng/mL     TSH [[id number]]  (Abnormal) Collected:  04/13/19 1724    Specimen:  Blood Updated:  04/13/19 1914     TSH 6.160 mIU/mL     T4, Free [[id number]]  (Normal) Collected:  04/13/19 1724    Specimen:  Blood Updated:  04/13/19 1900     Free T4 1.40 ng/dL     Urinalysis With Culture If Indicated - Urine, Catheter [[id number]]  (Abnormal) Collected:  04/13/19 1809    Specimen:  Urine, Catheter Updated:  04/13/19 1820     Color, UA Yellow     Appearance, UA Cloudy     pH, UA 6.5     Specific Gravity, UA 1.012     Glucose, UA Negative     Ketones, UA Negative      Bilirubin, UA Negative     Blood, UA Moderate (2+)     Protein, UA Negative     Leuk Esterase, UA Trace     Nitrite, UA Negative     Urobilinogen, UA 2.0 E.U./dL    Urinalysis, Microscopic Only - Urine, Catheter [[id number]]  (Abnormal) Collected:  04/13/19 1809    Specimen:  Urine, Catheter Updated:  04/13/19 1820     RBC, UA 21-30 /HPF      WBC, UA 3-5 /HPF      Bacteria, UA None Seen /HPF      Squamous Epithelial Cells, UA 0-2 /HPF      Hyaline Casts, UA None Seen /LPF      Methodology Automated Microscopy    Troponin [[id number]]  (Normal) Collected:  04/13/19 1724    Specimen:  Blood Updated:  04/13/19 1756     Troponin I 0.023 ng/mL     BNP [[id number]]  (Normal) Collected:  04/13/19 1724    Specimen:  Blood Updated:  04/13/19 1751     proBNP 126.0 pg/mL     Myoglobin, Serum [[id number]]  (Normal) Collected:  04/13/19 1724    Specimen:  Blood Updated:  04/13/19 1751     Myoglobin 97.8 ng/mL     Protime-INR [[id number]]  (Abnormal) Collected:  04/13/19 1724    Specimen:  Blood Updated:  04/13/19 1742     Protime 18.6 Seconds      INR 1.51    aPTT [[id number]]  (Abnormal) Collected:  04/13/19 1724    Specimen:  Blood Updated:  04/13/19 1742     PTT 58.4 seconds     D-dimer, Quantitative [[id number]]  (Abnormal) Collected:  04/13/19 1724    Specimen:  Blood Updated:  04/13/19 1742     D-Dimer, Quantitative 2.31 mg/L (FEU)     Narrative:       Reference Range is 0-0.50 mg/L FEU. However, results <0.50 mg/L FEU tends to rule out DVT or PE. Results >0.50 mg/L FEU are not useful in predicting absence or presence of DVT or PE.    CK [[id number]]  (Abnormal) Collected:  04/13/19 1724    Specimen:  Blood Updated:  04/13/19 1741     Creatine Kinase 286 U/L     Basic Metabolic Panel [[id number]]  (Abnormal) Collected:  04/13/19 1724    Specimen:  Blood Updated:  04/13/19 1741     Glucose 98 mg/dL      BUN 11 mg/dL      Creatinine 0.84 mg/dL      Sodium 139 mmol/L      Potassium 3.8 mmol/L      Chloride 108 mmol/L      CO2 29.0  mmol/L      Calcium 8.5 mg/dL      eGFR   Amer 82 mL/min/1.73      BUN/Creatinine Ratio 13.1     Anion Gap 2.0 mmol/L     Narrative:       GFR Normal >60  Chronic Kidney Disease <60  Kidney Failure <15    CBC & Differential [[id number]] Collected:  04/13/19 1724    Specimen:  Blood Updated:  04/13/19 1735    Narrative:       The following orders were created for panel order CBC & Differential.  Procedure                               Abnormality         Status                     ---------                               -----------         ------                     CBC Auto Differential[[id number]]        Abnormal            Final result                 Please view results for these tests on the individual orders.    CBC Auto Differential [[id number]]  (Abnormal) Collected:  04/13/19 1724    Specimen:  Blood Updated:  04/13/19 1735     WBC 3.48 10*3/mm3      RBC 3.73 10*6/mm3      Hemoglobin 10.8 g/dL      Hematocrit 32.9 %      MCV 88.2 fL      MCH 29.0 pg      MCHC 32.8 g/dL      RDW 15.8 %      RDW-SD 50.7 fl      MPV 11.6 fL      Platelets 78 10*3/mm3      Neutrophil % 55.4 %      Lymphocyte % 25.0 %      Monocyte % 13.8 %      Eosinophil % 4.9 %      Basophil % 0.6 %      Immature Grans % 0.3 %      Neutrophils, Absolute 1.93 10*3/mm3      Lymphocytes, Absolute 0.87 10*3/mm3      Monocytes, Absolute 0.48 10*3/mm3      Eosinophils, Absolute 0.17 10*3/mm3      Basophils, Absolute 0.02 10*3/mm3      Immature Grans, Absolute 0.01 10*3/mm3      nRBC 0.0 /100 WBC         Imaging Results (last 24 hours)     Procedure Component Value Units Date/Time    CT Angiogram Chest With Contrast [[id number]] Collected:  04/13/19 1859     Updated:  04/13/19 1910    Narrative:       Exam: CT angiogram of the of the chest with intravenous contrast.     CLINICAL HISTORY:  Chest pain.     DOSE:  387 mGycm. Automated exposure control was utilized to diminish  patient radiation dose.     TECHNIQUE: Contiguous axial images were  obtained through the thorax  following intravenous contrast administration with reformatted images  obtained in the sagittal and coronal projections from the original data  set. Three-dimensional reconstructions are also obtained.     COMPARISON:  None available     FINDINGS:     Pulmonary arteries:  There is normal enhancement of the pulmonary  arteries with no evidence of pulmonary thromboembolic disease.     Aorta:  The thoracic aorta and proximal great vessels are normal in  appearance. There is no evidence of aortic dissection or aneurysm.     LUNGS:  Bibasilar atelectasis is present. The lungs are otherwise clear  without evidence of lobar pneumonia or effusion.     Pleural spaces: Unremarkable. No evidence of pleural effusion or  pneumothorax.     HEART: There is moderate cardiomegaly. No evidence of right ventricular  dysfunction. Coronary artery calcifications are noted in the LAD  distribution. No evidence of pericardial effusion.     Bones: No acute osseous abnormalities are demonstrated.     CHEST WALL: There is a masslike density associated with the right breast  and correlation is recommended with the physical exam. This may simply  represent some asymmetric breast tissue that is certainly asymmetric  with the contralateral breast. There is an enlarged right axillary node  measuring 13 mm in short axis.     Lymph nodes: No enlarged mediastinal or left axillary adenopathy is  present.     Upper abdomen: The liver is cirrhotic in appearance. Cholelithiasis is  present. The spleen is enlarged suggesting portal hypertension. There  are suspected varicosities within the upper abdomen. 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 This appears to emanate from the proximal left splenic artery.       Impression:       1. No evidence of pulmonary thromboembolic disease.  2. Bibasilar atelectasis. No evidence of lobar pneumonia or effusion.  3. There is a  suspected right breast mass with asymmetric soft tissue  density within the breast. There is no enlarged right axillary node.  Correlation is recommended with the patient's physical exam. If not  recently undertaken follow-up with outpatient mammography would be  suggested.  4. Cirrhotic changes of the liver with splenomegaly and suspected upper  abdominal varices. FINDINGS suggest portal hypertension. There is a 2.1  cm aneurysm which appears to emanate from the proximal splenic artery  within the anterior upper abdomen.  This report was finalized on 04/13/2019 19:07 by Dr. Ace Moncada MD.        I have personally reviewed and interpreted the radiology studies and ECG obtained at time of admission.     Assessment / Plan     Assessment:   Active Hospital Problems    Diagnosis   • Chest pain   1.  Chest pain probably also involving abdominal pain I believe this is related to splenomegaly and splenic aneurysm will consult vascular surgery for further recommendations.  Due to the elevated troponin will also consult cardiology for recommendations.  Her troponins are currently trended down in the normal range.  I am not sure if this is part of the process or just a mild abnormality.  Will also check 2D echo.  2.  Abdominal pain splenomegaly aneurysm will do ultrasound of liver and spleen with Dopplers.  Will consult vascular surgery.  Hold anticoagulants currently.  Defer further testing and management to vascular surgery.  3.  Cirrhosis with evidence of varices continue current medications may need to consult GI she does seem to be followed by someone down in [place] who has been managing this per the  for many years.  Defer per the further workup and plans per primary team.  4.  Type 2 diabetes cover sliding scale insulin Accu-Cheks.      Patient seen prior to midnight         Code Status: Full     I discussed the patient's findings and my recommendations with the patient and her  is her  healthcare power surrogate    Estimated length of stay to be determined    Jhon Linda MD   04/13/19   11:55 PM

## 2019-04-14 NOTE — PROGRESS NOTES
[facility] Medicine Services  INPATIENT PROGRESS NOTE    Length of Stay: 0  Date of Admission: 4/13/2019  Primary Care Physician: See Fritz DO    Subjective   Chief Complaint: Follow-up chest pain  HPI   Patient is resting in bed with family at bedside.  She tells me that she is feeling okay today, but is having a lot of pain in the right lateral chest wall/right flank.  This pain is worse with deep inspiration and coughing.  Her son tells me that this has been occurring intermittently for at least the last 2 months.  The patient did suffer a fall at home at this time and her body got stuck between the commode and her sink, hitting her left side.  She tells me she does get short of breath at times, but does not have any shortness of breath at present.  She denies nausea but does have nausea at times when her pain is bad.  Her son states that she does take lactulose twice per day and does produce good bowel movements with this, and if she acts confused they do give her extra dosages.    Review of Systems   All pertinent negatives and positives are as above. All other systems have been reviewed and are negative unless otherwise stated.     Objective    Temp:  [97.9 °F (36.6 °C)-99.1 °F (37.3 °C)] 99 °F (37.2 °C)  Heart Rate:  [72-91] (P) 72  Resp:  [18-20] 20  BP: ()/(47-97) (P) 100/58  Physical Exam   Constitutional: She is oriented to person, place, and time. She appears well-developed and well-nourished. No distress.   Chronically ill-appearing   HENT:   Head: Normocephalic and atraumatic.   Neck: Normal range of motion. Neck supple. No JVD present. No tracheal deviation present.   Cardiovascular: Normal rate, regular rhythm, normal heart sounds and intact distal pulses. Exam reveals no gallop and no friction rub.   Normal sinus rhythm 74-94   Pulmonary/Chest: Effort normal and breath sounds normal. She has no wheezes. She has no rales.   Abdominal: Soft.  Bowel sounds are normal. She exhibits no distension. There is tenderness (Left flank/chest wall tenderness).   Musculoskeletal: Normal range of motion. She exhibits edema (Moderate BLE edema) and tenderness (Left chest wall tenderness).   Neurological: She is alert and oriented to person, place, and time. No cranial nerve deficit.   Skin: Skin is warm and dry. No rash noted. No erythema.   Vitiligo   Psychiatric: She has a normal mood and affect. Her behavior is normal. Judgment and thought content normal.   Vitals reviewed.    Results Review:  I have reviewed the labs, radiology results, and diagnostic studies.    Laboratory Data:   Results from last 7 days   Lab Units 04/14/19  0024 04/13/19  1724   WBC 10*3/mm3 3.18* 3.48*   HEMOGLOBIN g/dL 9.5* 10.8*   HEMATOCRIT % 28.4* 32.9*   PLATELETS 10*3/mm3 72* 78*     Results from last 7 days   Lab Units 04/14/19  0316 04/13/19  2037 04/13/19  1724   SODIUM mmol/L 139  --  139   POTASSIUM mmol/L 3.9  --  3.8   CHLORIDE mmol/L 109  --  108   CO2 mmol/L 26.0  --  29.0   BUN mg/dL 13  --  11   CREATININE mg/dL 0.82  --  0.84   CALCIUM mg/dL 8.1*  --  8.5   BILIRUBIN mg/dL 2.1* 2.5*  --    ALK PHOS U/L 249* 312*  --    ALT (SGPT) U/L 27 30  --    AST (SGOT) U/L 47* 60*  --    GLUCOSE mg/dL 128*  --  98     Radiology Data:   Imaging Results (last 24 hours)     Procedure Component Value Units Date/Time    CT Angiogram Chest With Contrast [[id number]] Collected:  04/13/19 1859     Updated:  04/13/19 1910    Narrative:       Exam: CT angiogram of the of the chest with intravenous contrast.     CLINICAL HISTORY:  Chest pain.     DOSE:  387 mGycm. Automated exposure control was utilized to diminish  patient radiation dose.     TECHNIQUE: Contiguous axial images were obtained through the thorax  following intravenous contrast administration with reformatted images  obtained in the sagittal and coronal projections from the original data  set. Three-dimensional reconstructions are  also obtained.     COMPARISON:  None available     FINDINGS:     Pulmonary arteries:  There is normal enhancement of the pulmonary  arteries with no evidence of pulmonary thromboembolic disease.     Aorta:  The thoracic aorta and proximal great vessels are normal in  appearance. There is no evidence of aortic dissection or aneurysm.     LUNGS:  Bibasilar atelectasis is present. The lungs are otherwise clear  without evidence of lobar pneumonia or effusion.     Pleural spaces: Unremarkable. No evidence of pleural effusion or  pneumothorax.     HEART: There is moderate cardiomegaly. No evidence of right ventricular  dysfunction. Coronary artery calcifications are noted in the LAD  distribution. No evidence of pericardial effusion.     Bones: No acute osseous abnormalities are demonstrated.     CHEST WALL: There is a masslike density associated with the right breast  and correlation is recommended with the physical exam. This may simply  represent some asymmetric breast tissue that is certainly asymmetric  with the contralateral breast. There is an enlarged right axillary node  measuring 13 mm in short axis.     Lymph nodes: No enlarged mediastinal or left axillary adenopathy is  present.     Upper abdomen: The liver is cirrhotic in appearance. Cholelithiasis is  present. The spleen is enlarged suggesting portal hypertension. There  are suspected varicosities within the upper abdomen. 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 This appears to emanate from the proximal left splenic artery.       Impression:       1. No evidence of pulmonary thromboembolic disease.  2. Bibasilar atelectasis. No evidence of lobar pneumonia or effusion.  3. There is a suspected right breast mass with asymmetric soft tissue  density within the breast. There is no enlarged right axillary node.  Correlation is recommended with the patient's physical exam. If not  recently undertaken  follow-up with outpatient mammography would be  suggested.  4. Cirrhotic changes of the liver with splenomegaly and suspected upper  abdominal varices. FINDINGS suggest portal hypertension. There is a 2.1  cm aneurysm which appears to emanate from the proximal splenic artery  within the anterior upper abdomen.  This report was finalized on 04/13/2019 19:07 by Dr. Ace Moncada MD.        I have reviewed the patient current medications.     Assessment/Plan     Active Hospital Problems    Diagnosis   • Left upper quadrant pain   • Splenic artery aneurysm (CMS/HCC)   • Splenomegaly   • Elevated troponin   • Liver cirrhosis (CMS/HCC)   • Diabetes mellitus (CMS/HCC)   • Disease of thyroid gland   • Normocytic anemia   • Chest pain     Plan:  1.  Appreciate vascular surgery assistance.  Dr. Orosco is to proceed with endovascular exclusion of the splenic artery aneurysm tomorrow.  Nothing by mouth after midnight.  2.  Would like to speak with the radiologist regarding her CTA of the chest to see if any rib fractures can be identified as a possible source of her pain as it is not felt that her aneurysm is the sole cause of her pain at this time.  Add lidocaine patches for pain.  3.  Ultrasound of the spleen today as ordered per Dr. Linda.  We will also order right upper quadrant ultrasound to evaluate her liver and gallbladder as cholelithiasis was noted on her CT as well.  4.  Continue lactulose at current dosing with goal of 3-4 bowel movements per day.  Re-check ammonia level in a.m.  5.  Discontinue Mcdonald catheter that was placed at the outlying facility  6.  Okay for clear liquid diet after ultrasound and nothing by mouth after midnight for surgery tomorrow  7.  Physical and occupational therapy consults  8.  Transthoracic echocardiogram is pending  9.  Appreciate cardiology evaluation.  Troponin levels at our facility have been negative.  If her echocardiogram is normal there were be no further cardiac workup  necessary.  10.  CT also shows concern for a right breast mass.  There is no mass appreciable on physical exam.  Have recommended that the patient follow-up with an outpatient mammogram.  11.  Add incentive spirometer for bibasilar atelectasis  12.  Labs in a.m.    Discharge Planning: I expect the patient to be discharged to home in 2-3 days.    Lauren Edwards, DEISI   04/14/19   11:34 AM

## 2019-04-14 NOTE — CONSULTS
Daniel Oliver  [id number]  [id number]  410/1  Jay Mckeon MD  4/13/2019    Chief Complaint   Patient presents with   • Chest Pain       HPI: Daniel Oliver is a 68 y.o. female with history of hypertension, type II diabetes, Hypothyroidism, and Liver cirrhosis. Patient presented to an outlying facility for left sided flank pain from left leg up to left breast. She denied any chest pain. There was report of an elevated troponin of 0.125. However the 4 troponins checked here have been normal. EKG reviewed with no acute abnormality. 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       Past Medical History:   Diagnosis Date   • Diabetes mellitus (CMS/HCC)    • Disease of thyroid gland    • Hypertension    • Liver cirrhosis (CMS/HCC)        Past Surgical History:   Procedure Laterality Date   • HIP ARTHROPLASTY         History reviewed. No pertinent family history.    Social History     Socioeconomic History   • Marital status:      Spouse name: Not on file   • Number of children: Not on file   • Years of education: Not on file   • Highest education level: Not on file   Tobacco Use   • Smoking status: Never Smoker   Substance and Sexual Activity   • Drug use: No   • Sexual activity: Defer       No Known Allergies    Hospital Medications (active)       Dose Frequency Start End    aspirin tablet 325 mg 325 mg Daily 4/14/2019     Sig - Route: Take 1 tablet by mouth Daily. - Oral    carvedilol (COREG) tablet 3.125 mg 3.125 mg Every 12 Hours Scheduled 4/14/2019     Sig - Route: Take 1 tablet by mouth Every 12 (Twelve) Hours. - Oral    CloNIDine (CATAPRES) tablet 0.1 mg 0.1 mg Every 6 Hours PRN 4/13/2019     Sig - Route: Take 1 tablet by mouth Every 6 (Six) Hours As Needed for High  "Blood Pressure (SBP>160). - Oral    dextrose (D50W) 25 g/ 50mL Intravenous Solution 25 g 25 g Every 15 Minutes PRN 4/13/2019     Sig - Route: Infuse 50 mL into a venous catheter Every 15 (Fifteen) Minutes As Needed for Low Blood Sugar (Blood Sugar Less Than 70). - Intravenous    dextrose (GLUTOSE) oral gel 15 g 15 g Every 15 Minutes PRN 4/13/2019     Sig - Route: Take 15 g by mouth Every 15 (Fifteen) Minutes As Needed for Low Blood Sugar (Blood sugar less than 70). - Oral    furosemide (LASIX) tablet 40 mg 40 mg 2 Times Daily (Diuretics) 4/14/2019     Sig - Route: Take 1 tablet by mouth 2 (Two) Times a Day. - Oral    glucagon (human recombinant) (GLUCAGEN DIAGNOSTIC) injection 1 mg 1 mg As Needed 4/13/2019     Sig - Route: Inject 1 mg under the skin into the appropriate area as directed As Needed (Blood Glucose Less Than 70). - Subcutaneous    insulin lispro (humaLOG) injection 0-9 Units 0-9 Units 4 Times Daily With Meals & Nightly 4/14/2019     Sig - Route: Inject 0-9 Units under the skin into the appropriate area as directed 4 (Four) Times a Day With Meals & at Bedtime. - Subcutaneous    iopamidol (ISOVUE-370) 76 % injection 150 mL 150 mL Once in Imaging 4/13/2019 4/13/2019    Sig - Route: Infuse 150 mL into a venous catheter Once. - Intravenous    lactulose solution 20 g 20 g 2 Times Daily 4/14/2019     Sig - Route: Take 30 mL by mouth 2 (Two) Times a Day. - Oral    levothyroxine (SYNTHROID, LEVOTHROID) tablet 88 mcg 88 mcg Every Early Morning 4/14/2019     Sig - Route: Take 1 tablet by mouth Every Morning. - Oral    Morphine sulfate (PF) injection 1 mg 1 mg Every 4 Hours PRN 4/13/2019 4/23/2019    Sig - Route: Infuse 0.5 mL into a venous catheter Every 4 (Four) Hours As Needed for Severe Pain  (Chest pain). - Intravenous    Linked Group 1:  \"And\" Linked Group Details        naloxone (NARCAN) injection 0.4 mg 0.4 mg Every 5 Minutes PRN 4/13/2019     Sig - Route: Infuse 1 mL into a venous catheter Every 5 (Five) " "Minutes As Needed for Respiratory Depression. - Intravenous    Linked Group 1:  \"And\" Linked Group Details        nitroglycerin (NITROSTAT) SL tablet 0.4 mg 0.4 mg Every 5 Minutes PRN 4/13/2019     Sig - Route: Place 1 tablet under the tongue Every 5 (Five) Minutes As Needed for Chest Pain. - Sublingual    ondansetron (ZOFRAN) injection 4 mg 4 mg Every 6 Hours PRN 4/13/2019     Sig - Route: Infuse 2 mL into a venous catheter Every 6 (Six) Hours As Needed for Nausea or Vomiting. - Intravenous    Linked Group 2:  \"Or\" Linked Group Details        ondansetron (ZOFRAN) tablet 4 mg 4 mg Every 6 Hours PRN 4/13/2019     Sig - Route: Take 1 tablet by mouth Every 6 (Six) Hours As Needed for Nausea or Vomiting. - Oral    Linked Group 2:  \"Or\" Linked Group Details        perflutren (DEFINITY) lipid microspheres injection 8.476 mg 1.3 mL Once 4/14/2019 4/14/2019    Sig - Route: Infuse 1.3 mL into a venous catheter 1 (One) Time. - Intravenous    rosuvastatin (CRESTOR) tablet 20 mg 20 mg Nightly 4/14/2019     Sig - Route: Take 1 tablet by mouth Every Night. - Oral    sodium chloride 0.45 % infusion 100 mL/hr Continuous 4/14/2019     Sig - Route: Infuse 100 mL/hr into a venous catheter Continuous. - Intravenous    sodium chloride 0.9 % flush 10 mL 10 mL As Needed 4/13/2019     Sig - Route: Infuse 10 mL into a venous catheter As Needed for Line Care. - Intravenous    Linked Group 3:  \"And\" Linked Group Details        sodium chloride 0.9 % flush 3 mL 3 mL Every 12 Hours Scheduled 4/14/2019     Sig - Route: Infuse 3 mL into a venous catheter Every 12 (Twelve) Hours. - Intravenous    sodium chloride 0.9 % flush 3-10 mL 3-10 mL As Needed 4/13/2019     Sig - Route: Infuse 3-10 mL into a venous catheter As Needed for Line Care. - Intravenous    enoxaparin (LOVENOX) syringe 40 mg (Discontinued) 40 mg Every 24 Hours 4/14/2019 4/14/2019    Sig - Route: Inject 0.4 mL under the skin into the appropriate area as directed Daily. - Subcutaneous " "   levothyroxine (SYNTHROID, LEVOTHROID) tablet 75 mcg (Discontinued) 75 mcg Every Early Morning 4/14/2019 4/14/2019    Sig - Route: Take 1 tablet by mouth Every Morning. - Oral    sodium chloride 0.9 % infusion (Discontinued) 100 mL/hr Continuous 4/14/2019 4/14/2019    Sig - Route: Infuse 100 mL/hr into a venous catheter Continuous. - Intravenous    sodium chloride 0.9 % with KCl 20 mEq/L infusion (Discontinued) 100 mL/hr Continuous 4/14/2019 4/14/2019    Sig - Route: Infuse 100 mL/hr into a venous catheter Continuous. - Intravenous          Review of Systems   Constitutional: Negative.    HENT: Negative.    Eyes: Negative.    Respiratory: Negative.    Cardiovascular: Negative.    Gastrointestinal: Positive for abdominal pain.   Endocrine: Negative.    Genitourinary: Negative.    Musculoskeletal: Negative.    Skin: Negative.    Allergic/Immunologic: Negative.    Neurological: Negative.    Hematological: Negative.    Psychiatric/Behavioral: Negative.    All other systems reviewed and are negative.      BP (P) 100/58   Pulse (P) 72   Temp 99 °F (37.2 °C) (Oral)   Resp 20   Ht 167.6 cm (65.98\")   Wt 83.6 kg (184 lb 4.9 oz)   SpO2 97%   BMI 29.76 kg/m²     Physical Exam   Constitutional: She is oriented to person, place, and time. She appears well-developed and well-nourished.   HENT:   Head: Normocephalic and atraumatic.   Eyes: Pupils are equal, round, and reactive to light. No scleral icterus.   Neck: Neck supple. No JVD present. Carotid bruit is not present. No thyromegaly present.   Cardiovascular: Normal rate, regular rhythm and normal heart sounds.   Pulses:       Carotid pulses are 2+ on the right side, and 2+ on the left side.       Femoral pulses are 2+ on the right side, and 2+ on the left side.       Popliteal pulses are 2+ on the right side, and 2+ on the left side.        Dorsalis pedis pulses are 2+ on the right side, and 2+ on the left side.        Posterior tibial pulses are 2+ on the right " side, and 2+ on the left side.   Pulmonary/Chest: Effort normal and breath sounds normal.   Abdominal: Soft. Bowel sounds are normal. She exhibits no distension, no abdominal bruit and no mass. There is no hepatosplenomegaly. There is tenderness.   Musculoskeletal: Normal range of motion. She exhibits no edema.   Lymphadenopathy:     She has no cervical adenopathy.   Neurological: She is alert and oriented to person, place, and time. She has normal strength. No cranial nerve deficit or sensory deficit.   Skin: Skin is warm, dry and intact.   Psychiatric: She has a normal mood and affect.   Nursing note and vitals reviewed.      Laboratory Data:  Results from last 7 days   Lab Units 04/14/19  0024 04/13/19  1724   WBC 10*3/mm3 3.18* 3.48*   HEMOGLOBIN g/dL 9.5* 10.8*   HEMATOCRIT % 28.4* 32.9*   PLATELETS 10*3/mm3 72* 78*       Results from last 7 days   Lab Units 04/14/19  0316 04/13/19  2037 04/13/19  1724   SODIUM mmol/L 139  --  139   POTASSIUM mmol/L 3.9  --  3.8   CHLORIDE mmol/L 109  --  108   CO2 mmol/L 26.0  --  29.0   BUN mg/dL 13  --  11   CREATININE mg/dL 0.82  --  0.84   CALCIUM mg/dL 8.1*  --  8.5   BILIRUBIN mg/dL 2.1* 2.5*  --    ALK PHOS U/L 249* 312*  --    ALT (SGPT) U/L 27 30  --    AST (SGOT) U/L 47* 60*  --    GLUCOSE mg/dL 128*  --  98     Results from last 7 days   Lab Units 04/13/19  1724   INR  1.51*   APTT seconds 58.4*         Diagnostic Data:  Imaging Results (last 24 hours)     Procedure Component Value Units Date/Time    CT Angiogram Chest With Contrast [[id number]] Collected:  04/13/19 1859     Updated:  04/13/19 1910    Narrative:       Exam: CT angiogram of the of the chest with intravenous contrast.     CLINICAL HISTORY:  Chest pain.     DOSE:  387 mGycm. Automated exposure control was utilized to diminish  patient radiation dose.     TECHNIQUE: Contiguous axial images were obtained through the thorax  following intravenous contrast administration with reformatted images  obtained in  the sagittal and coronal projections from the original data  set. Three-dimensional reconstructions are also obtained.     COMPARISON:  None available     FINDINGS:     Pulmonary arteries:  There is normal enhancement of the pulmonary  arteries with no evidence of pulmonary thromboembolic disease.     Aorta:  The thoracic aorta and proximal great vessels are normal in  appearance. There is no evidence of aortic dissection or aneurysm.     LUNGS:  Bibasilar atelectasis is present. The lungs are otherwise clear  without evidence of lobar pneumonia or effusion.     Pleural spaces: Unremarkable. No evidence of pleural effusion or  pneumothorax.     HEART: There is moderate cardiomegaly. No evidence of right ventricular  dysfunction. Coronary artery calcifications are noted in the LAD  distribution. No evidence of pericardial effusion.     Bones: No acute osseous abnormalities are demonstrated.     CHEST WALL: There is a masslike density associated with the right breast  and correlation is recommended with the physical exam. This may simply  represent some asymmetric breast tissue that is certainly asymmetric  with the contralateral breast. There is an enlarged right axillary node  measuring 13 mm in short axis.     Lymph nodes: No enlarged mediastinal or left axillary adenopathy is  present.     Upper abdomen: The liver is cirrhotic in appearance. Cholelithiasis is  present. The spleen is enlarged suggesting portal hypertension. There  are suspected varicosities within the upper abdomen. 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 This appears to emanate from the proximal left splenic artery.       Impression:       1. No evidence of pulmonary thromboembolic disease.  2. Bibasilar atelectasis. No evidence of lobar pneumonia or effusion.  3. There is a suspected right breast mass with asymmetric soft tissue  density within the breast. There is no enlarged right  axillary node.  Correlation is recommended with the patient's physical exam. If not  recently undertaken follow-up with outpatient mammography would be  suggested.  4. Cirrhotic changes of the liver with splenomegaly and suspected upper  abdominal varices. FINDINGS suggest portal hypertension. There is a 2.1  cm aneurysm which appears to emanate from the proximal splenic artery  within the anterior upper abdomen.  This report was finalized on 04/13/2019 19:07 by Dr. Ace Moncada MD.          Impression:    Chest pain    Left upper quadrant pain    Splenic artery aneurysm (CMS/HCC)    Splenomegaly    Elevated troponin    Liver cirrhosis (CMS/HCC)    Diabetes mellitus (CMS/HCC)    Disease of thyroid gland  2.1 cm splenic artery aneurysm    Plan: After thoroughly evaluating Daniel Oliver, I believe the best course of action is to proceed with endovascular exclusion of the aneurysm.  I believe that a Viabahn stent graft can be placed across the safely to exclude the small saccular aneurysm.  Risk/benefits were explained at great length to the patient which include but not limited to bleeding, infection, vessel damage, vessel rupture, MI, stroke, and death.  The patient understands the risks and wished for me to proceed.  Thank you for allowing me to see Daniel Oliver in consult. Please feel free to reach out with any questions or concerns.    Joel Orosco,   4/14/2019  11:08 AM

## 2019-04-14 NOTE — CONSULTS
[facility] HEART GROUP CONSULT NOTE    Referring Provider: Jhon Linda MD    Reason for Consultation: Elevated Troponin, Chest Pain    Chief complaint:   Chief Complaint   Patient presents with   • Chest Pain       Subjective .     History of present illness:  Daniel Oliver is a 68 y.o. female with history of hypertension, type II diabetes, Hypothyroidism, and Liver cirrhosis. Patient presented to an outlying facility for left sided flank pain from left leg up to left breast. She denies any chest pain to me. There was report of an elevated troponin of 0.125. However the 4 troponins checked here have been beth. EKG reviewed with no acute abnormality. Patient has had an echo this morning with results pending. Patient denies chest pain at this time. 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 Patient is awaiting evaluation by vascular surgery. Patient also has a US of spleen and liver pending. Patient is asking if she might be able to go home today. Denies shortness of breath. Patient has pitting edema to lower legs which she reports is chronic for years.     History  Past Medical History:   Diagnosis Date   • Diabetes mellitus (CMS/HCC)    • Disease of thyroid gland    • Hypertension    • Liver cirrhosis (CMS/HCC)    ,   Past Surgical History:   Procedure Laterality Date   • HIP ARTHROPLASTY     ,   History reviewed. No pertinent family history.,   Social History     Tobacco Use   • Smoking status: Never Smoker   Substance Use Topics   • Alcohol use: Not on file   • Drug use: No   ,     Medications    Prior to Admission medications    Medication Sig Start Date End Date Taking? Authorizing Provider   CloNIDine (CATAPRES) 0.1 MG tablet Take 0.1 mg by mouth 1 (One) Time.    Yes Yudelka Donald MD   furosemide (LASIX) 40 MG tablet Take 40 mg by mouth 2 (Two) Times a Day.   Yes Yudelka Donald MD   insulin aspart (novoLOG) 100 UNIT/ML injection Inject  under the skin into the appropriate area as directed 3 (Three) Times a Day Before Meals.   Yes Yudelka Donald MD   insulin glargine (LANTUS) 100 UNIT/ML injection Inject 10 Units under the skin into the appropriate area as directed 2 (Two) Times a Day.   Yes Yudelka Donald MD   lactulose (CHRONULAC) 10 GM/15ML solution Take 20 g by mouth 2 (Two) Times a Day As Needed.   Yes Yudelka Donald MD   levothyroxine (SYNTHROID, LEVOTHROID) 75 MCG tablet Take 75 mcg by mouth Daily.   Yes Yudelka Donald MD   POTASSIUM CHLORIDE ER PO Take  by mouth.   Yes Yudelka Donald MD       Current Facility-Administered Medications   Medication Dose Route Frequency Provider Last Rate Last Dose   • aspirin tablet 325 mg  325 mg Oral Daily Jhon Linda MD       • carvedilol (COREG) tablet 3.125 mg  3.125 mg Oral Q12H Jhon Linda MD   3.125 mg at 04/14/19 0127   • CloNIDine (CATAPRES) tablet 0.1 mg  0.1 mg Oral Q6H PRN Jhon Linda MD   0.1 mg at 04/14/19 0127   • dextrose (D50W) 25 g/ 50mL Intravenous Solution 25 g  25 g Intravenous Q15 Min PRN Jhon Linda MD       • dextrose (GLUTOSE) oral gel 15 g  15 g Oral Q15 Min PRN Jhon Linda MD       • furosemide (LASIX) tablet 40 mg  40 mg Oral BID Jhon Linda MD       • glucagon (human recombinant) (GLUCAGEN DIAGNOSTIC) injection 1 mg  1 mg Subcutaneous PRN Jhon Linda MD       • insulin lispro (humaLOG) injection 0-9 Units  0-9 Units Subcutaneous 4x Daily With Meals & Nightly Jhon Linda MD       • lactulose solution 20 g  20 g Oral BID Jhon Linda MD       • levothyroxine (SYNTHROID, LEVOTHROID) tablet 88 mcg  88 mcg Oral Q AM Jhon Linda MD   88 mcg at 04/14/19 0534   • Morphine sulfate (PF) injection 1  mg  1 mg Intravenous Q4H PRN Jhon Linda MD   1 mg at 04/14/19 0138    And   • naloxone (NARCAN) injection 0.4 mg  0.4 mg Intravenous Q5 Min PRN Jhon Linda MD       • nitroglycerin (NITROSTAT) SL tablet 0.4 mg  0.4 mg Sublingual Q5 Min PRN Jhon Linda MD       • ondansetron (ZOFRAN) tablet 4 mg  4 mg Oral Q6H PRN Jhon Linda MD        Or   • ondansetron (ZOFRAN) injection 4 mg  4 mg Intravenous Q6H PRN Jhon Linda MD   4 mg at 04/14/19 0144   • rosuvastatin (CRESTOR) tablet 20 mg  20 mg Oral Nightly Jhon Linda MD   20 mg at 04/14/19 0127   • sodium chloride 0.45 % infusion  100 mL/hr Intravenous Continuous hJon Linda  mL/hr at 04/14/19 0534 100 mL/hr at 04/14/19 0534   • sodium chloride 0.9 % flush 10 mL  10 mL Intravenous PRN Aaron Robbins, DO       • sodium chloride 0.9 % flush 3 mL  3 mL Intravenous Q12H Jhon Linda MD   3 mL at 04/14/19 0127   • sodium chloride 0.9 % flush 3-10 mL  3-10 mL Intravenous PRN Jhon Linda MD           Allergies:  Patient has no known allergies.    Review of Systems  Review of Systems   Constitution: Positive for weakness and malaise/fatigue. Negative for chills, decreased appetite, fever, weight gain and weight loss.   HENT: Negative for nosebleeds.    Eyes: Negative for visual disturbance.   Cardiovascular: Positive for leg swelling. Negative for chest pain, dyspnea on exertion, near-syncope, orthopnea, palpitations, paroxysmal nocturnal dyspnea and syncope.   Respiratory: Negative for cough, hemoptysis, shortness of breath and snoring.    Endocrine: Negative for cold intolerance and heat intolerance.   Hematologic/Lymphatic: Negative for bleeding problem. Does not bruise/bleed easily.   Skin: Negative for rash.   Musculoskeletal: Negative for back pain and falls.        Leg pain    Gastrointestinal: Positive for abdominal pain. Negative for constipation, diarrhea, heartburn, melena, nausea and vomiting.       "  Flank pain    Genitourinary: Positive for flank pain. Negative for hematuria.   Neurological: Negative for dizziness, headaches and light-headedness.   Psychiatric/Behavioral: Negative for altered mental status.   Allergic/Immunologic: Negative for persistent infections.       Objective     Physical Exam:  Patient Vitals for the past 24 hrs:   BP Temp Temp src Pulse Resp SpO2 Height Weight   04/14/19 0907 -- -- -- -- -- -- 167.6 cm (65.98\") 83.6 kg (184 lb 4.9 oz)   04/14/19 0832 99/47 99 °F (37.2 °C) Oral 73 20 97 % -- --   04/14/19 0322 123/60 99.1 °F (37.3 °C) Oral 83 18 92 % -- --   04/13/19 2352 -- -- -- 83 -- 98 % -- --   04/13/19 2322 170/66 97.9 °F (36.6 °C) Oral 81 18 99 % -- --   04/13/19 2042 166/76 98.7 °F (37.1 °C) Oral 81 18 99 % 167.6 cm (65.98\") 83.6 kg (184 lb 5 oz)   04/13/19 1953 -- -- -- -- 18 -- -- --   04/13/19 1948 -- 98 °F (36.7 °C) -- -- 18 -- -- --   04/13/19 1947 164/82 -- -- 88 -- 100 % -- --   04/13/19 1946 -- -- -- 79 -- 99 % -- --   04/13/19 1945 -- -- -- 91 -- 100 % -- --   04/13/19 1933 -- -- -- 80 -- 100 % -- --   04/13/19 1932 165/81 -- -- 80 -- 100 % -- --   04/13/19 1919 165/76 -- -- 75 -- 100 % -- --   04/13/19 1816 170/79 -- -- 78 -- 100 % -- --   04/13/19 1809 -- -- -- 79 -- 100 % -- --   04/13/19 1802 172/74 -- -- 80 -- 100 % -- --   04/13/19 1747 154/80 -- -- 75 -- 100 % -- --   04/13/19 1732 157/75 -- -- 77 -- 100 % -- --   04/13/19 1717 158/80 -- -- 82 -- 100 % -- --   04/13/19 1707 176/96 -- -- 77 -- 100 % -- --   04/13/19 1701 167/83 -- -- 80 -- 100 % -- --   04/13/19 1700 167/83 98 °F (36.7 °C) Oral -- -- -- -- --   04/13/19 1659 -- -- -- 80 -- 100 % -- --   04/13/19 1658 172/97 -- -- 75 -- 100 % -- --   04/13/19 1657 -- -- -- 79 18 100 % 167.6 cm (66\") 83.1 kg (183 lb 3.2 oz)     Physical Exam   Constitutional: She is oriented to person, place, and time. She appears well-developed and well-nourished.   HENT:   Head: Normocephalic and atraumatic.   Eyes: Pupils are " equal, round, and reactive to light.   Neck: Normal range of motion. Neck supple. No JVD present. Carotid bruit is not present.   Cardiovascular: Normal rate, regular rhythm, normal heart sounds and intact distal pulses.   Pulmonary/Chest: Effort normal and breath sounds normal.   Abdominal: Soft. Bowel sounds are normal.   Musculoskeletal: Normal range of motion. She exhibits edema (bilateral leg edema ).   Neurological: She is alert and oriented to person, place, and time. She has normal reflexes.   Patient answers questions appropriately but does have slurred speech- which her  states is normal for her in the mornings.    Skin: Skin is warm and dry.   Pigment discoloration    Psychiatric: She has a normal mood and affect. Her behavior is normal. Judgment and thought content normal.       Results Review:   I reviewed the patient's new clinical results.  Lab Results (last 72 hours)     Procedure Component Value Units Date/Time    POC Glucose Once [[id number]]  (Normal) Collected:  04/14/19 0834    Specimen:  Blood Updated:  04/14/19 0846     Glucose 105 mg/dL      Comment: : [id number] Lambert MichelleMeter ID: [id number]       Hemoglobin A1c [[id number]] Collected:  04/14/19 0024    Specimen:  Blood Updated:  04/14/19 0842     Hemoglobin A1C 4.6 %     Narrative:       Less than 6.0           Non-Diabetic Range  6.0-7.0                 ADA Therapeutic Target  Greater than 7.0        Action Suggested    Lipid Panel [[id number]]  (Abnormal) Collected:  04/14/19 0316    Specimen:  Blood Updated:  04/14/19 0358     Total Cholesterol 122 mg/dL      Triglycerides 50 mg/dL      HDL Cholesterol 41 mg/dL      LDL Cholesterol  38 mg/dL      LDL/HDL Ratio 1.73    Troponin [[id number]]  (Normal) Collected:  04/14/19 0316    Specimen:  Blood Updated:  04/14/19 0355     Troponin I 0.019 ng/mL     Magnesium [[id number]]  (Normal) Collected:  04/14/19 0316    Specimen:  Blood Updated:  04/14/19 0347     Magnesium 1.7 mg/dL      Comprehensive Metabolic Panel [[id number]]  (Abnormal) Collected:  04/14/19 0316    Specimen:  Blood Updated:  04/14/19 0347     Glucose 128 mg/dL      BUN 13 mg/dL      Creatinine 0.82 mg/dL      Sodium 139 mmol/L      Potassium 3.9 mmol/L      Chloride 109 mmol/L      CO2 26.0 mmol/L      Calcium 8.1 mg/dL      Total Protein 4.7 g/dL      Albumin 1.90 g/dL      ALT (SGPT) 27 U/L      AST (SGOT) 47 U/L      Alkaline Phosphatase 249 U/L      Total Bilirubin 2.1 mg/dL      eGFR  African Amer 84 mL/min/1.73      Globulin 2.8 gm/dL      A/G Ratio 0.7 g/dL      BUN/Creatinine Ratio 15.9     Anion Gap 4.0 mmol/L     Narrative:       GFR Normal >60  Chronic Kidney Disease <60  Kidney Failure <15    Ammonia [[id number]]  (Abnormal) Collected:  04/14/19 0316    Specimen:  Blood Updated:  04/14/19 0341     Ammonia 137 umol/L     T4, Free [[id number]]  (Normal) Collected:  04/14/19 0024    Specimen:  Blood Updated:  04/14/19 0108     Free T4 1.47 ng/dL     Troponin [[id number]]  (Normal) Collected:  04/14/19 0024    Specimen:  Blood Updated:  04/14/19 0105     Troponin I 0.019 ng/mL     CBC Auto Differential [[id number]]  (Abnormal) Collected:  04/14/19 0024    Specimen:  Blood Updated:  04/14/19 0052     WBC 3.18 10*3/mm3      RBC 3.24 10*6/mm3      Hemoglobin 9.5 g/dL      Hematocrit 28.4 %      MCV 87.7 fL      MCH 29.3 pg      MCHC 33.5 g/dL      RDW 15.8 %      RDW-SD 50.4 fl      MPV 12.0 fL      Platelets 72 10*3/mm3      Neutrophil % 60.1 %      Lymphocyte % 22.0 %      Monocyte % 12.9 %      Eosinophil % 4.1 %      Basophil % 0.6 %      Immature Grans % 0.3 %      Neutrophils, Absolute 1.91 10*3/mm3      Lymphocytes, Absolute 0.70 10*3/mm3      Monocytes, Absolute 0.41 10*3/mm3      Eosinophils, Absolute 0.13 10*3/mm3      Basophils, Absolute 0.02 10*3/mm3      Immature Grans, Absolute 0.01 10*3/mm3      nRBC 0.0 /100 WBC     BNP [[id number]]  (Normal) Collected:  04/13/19 2037    Specimen:  Blood Updated:  04/14/19  0038     proBNP 125.0 pg/mL     Hepatic Function Panel [[id number]]  (Abnormal) Collected:  04/13/19 2037    Specimen:  Blood Updated:  04/14/19 0029     Total Protein 5.7 g/dL      Albumin 2.30 g/dL      ALT (SGPT) 30 U/L      AST (SGOT) 60 U/L      Alkaline Phosphatase 312 U/L      Total Bilirubin 2.5 mg/dL      Bilirubin, Direct 0.0 mg/dL      Bilirubin, Indirect 1.8 mg/dL     Magnesium [[id number]]  (Normal) Collected:  04/13/19 2037    Specimen:  Blood Updated:  04/14/19 0029     Magnesium 1.7 mg/dL     Troponin [[id number]]  (Normal) Collected:  04/13/19 2037    Specimen:  Blood Updated:  04/13/19 2106     Troponin I 0.026 ng/mL     TSH [[id number]]  (Abnormal) Collected:  04/13/19 1724    Specimen:  Blood Updated:  04/13/19 1914     TSH 6.160 mIU/mL     T4, Free [[id number]]  (Normal) Collected:  04/13/19 1724    Specimen:  Blood Updated:  04/13/19 1900     Free T4 1.40 ng/dL     Urinalysis With Culture If Indicated - Urine, Catheter [[id number]]  (Abnormal) Collected:  04/13/19 1809    Specimen:  Urine, Catheter Updated:  04/13/19 1820     Color, UA Yellow     Appearance, UA Cloudy     pH, UA 6.5     Specific Gravity, UA 1.012     Glucose, UA Negative     Ketones, UA Negative     Bilirubin, UA Negative     Blood, UA Moderate (2+)     Protein, UA Negative     Leuk Esterase, UA Trace     Nitrite, UA Negative     Urobilinogen, UA 2.0 E.U./dL    Urinalysis, Microscopic Only - Urine, Catheter [[id number]]  (Abnormal) Collected:  04/13/19 1809    Specimen:  Urine, Catheter Updated:  04/13/19 1820     RBC, UA 21-30 /HPF      WBC, UA 3-5 /HPF      Bacteria, UA None Seen /HPF      Squamous Epithelial Cells, UA 0-2 /HPF      Hyaline Casts, UA None Seen /LPF      Methodology Automated Microscopy    Troponin [[id number]]  (Normal) Collected:  04/13/19 1724    Specimen:  Blood Updated:  04/13/19 1756     Troponin I 0.023 ng/mL     BNP [[id number]]  (Normal) Collected:  04/13/19 1724    Specimen:  Blood Updated:  04/13/19 4520      proBNP 126.0 pg/mL     Myoglobin, Serum [[id number]]  (Normal) Collected:  04/13/19 1724    Specimen:  Blood Updated:  04/13/19 1751     Myoglobin 97.8 ng/mL     Protime-INR [[id number]]  (Abnormal) Collected:  04/13/19 1724    Specimen:  Blood Updated:  04/13/19 1742     Protime 18.6 Seconds      INR 1.51    aPTT [[id number]]  (Abnormal) Collected:  04/13/19 1724    Specimen:  Blood Updated:  04/13/19 1742     PTT 58.4 seconds     D-dimer, Quantitative [[id number]]  (Abnormal) Collected:  04/13/19 1724    Specimen:  Blood Updated:  04/13/19 1742     D-Dimer, Quantitative 2.31 mg/L (FEU)     Narrative:       Reference Range is 0-0.50 mg/L FEU. However, results <0.50 mg/L FEU tends to rule out DVT or PE. Results >0.50 mg/L FEU are not useful in predicting absence or presence of DVT or PE.    CK [[id number]]  (Abnormal) Collected:  04/13/19 1724    Specimen:  Blood Updated:  04/13/19 1741     Creatine Kinase 286 U/L     Basic Metabolic Panel [[id number]]  (Abnormal) Collected:  04/13/19 1724    Specimen:  Blood Updated:  04/13/19 1741     Glucose 98 mg/dL      BUN 11 mg/dL      Creatinine 0.84 mg/dL      Sodium 139 mmol/L      Potassium 3.8 mmol/L      Chloride 108 mmol/L      CO2 29.0 mmol/L      Calcium 8.5 mg/dL      eGFR   Amer 82 mL/min/1.73      BUN/Creatinine Ratio 13.1     Anion Gap 2.0 mmol/L     Narrative:       GFR Normal >60  Chronic Kidney Disease <60  Kidney Failure <15    CBC & Differential [[id number]] Collected:  04/13/19 1724    Specimen:  Blood Updated:  04/13/19 1735    Narrative:       The following orders were created for panel order CBC & Differential.  Procedure                               Abnormality         Status                     ---------                               -----------         ------                     CBC Auto Differential[[id number]]        Abnormal            Final result                 Please view results for these tests on the individual orders.    CBC Auto  Differential [[id number]]  (Abnormal) Collected:  04/13/19 1724    Specimen:  Blood Updated:  04/13/19 1735     WBC 3.48 10*3/mm3      RBC 3.73 10*6/mm3      Hemoglobin 10.8 g/dL      Hematocrit 32.9 %      MCV 88.2 fL      MCH 29.0 pg      MCHC 32.8 g/dL      RDW 15.8 %      RDW-SD 50.7 fl      MPV 11.6 fL      Platelets 78 10*3/mm3      Neutrophil % 55.4 %      Lymphocyte % 25.0 %      Monocyte % 13.8 %      Eosinophil % 4.9 %      Basophil % 0.6 %      Immature Grans % 0.3 %      Neutrophils, Absolute 1.93 10*3/mm3      Lymphocytes, Absolute 0.87 10*3/mm3      Monocytes, Absolute 0.48 10*3/mm3      Eosinophils, Absolute 0.17 10*3/mm3      Basophils, Absolute 0.02 10*3/mm3      Immature Grans, Absolute 0.01 10*3/mm3      nRBC 0.0 /100 WBC           No results found for: ECHOEFEST    Imaging Results (last 72 hours)     Procedure Component Value Units Date/Time    CT Angiogram Chest With Contrast [[id number]] Collected:  04/13/19 1859     Updated:  04/13/19 1910    Narrative:       Exam: CT angiogram of the of the chest with intravenous contrast.     CLINICAL HISTORY:  Chest pain.     DOSE:  387 mGycm. Automated exposure control was utilized to diminish  patient radiation dose.     TECHNIQUE: Contiguous axial images were obtained through the thorax  following intravenous contrast administration with reformatted images  obtained in the sagittal and coronal projections from the original data  set. Three-dimensional reconstructions are also obtained.     COMPARISON:  None available     FINDINGS:     Pulmonary arteries:  There is normal enhancement of the pulmonary  arteries with no evidence of pulmonary thromboembolic disease.     Aorta:  The thoracic aorta and proximal great vessels are normal in  appearance. There is no evidence of aortic dissection or aneurysm.     LUNGS:  Bibasilar atelectasis is present. The lungs are otherwise clear  without evidence of lobar pneumonia or effusion.     Pleural spaces: Unremarkable.  No evidence of pleural effusion or  pneumothorax.     HEART: There is moderate cardiomegaly. No evidence of right ventricular  dysfunction. Coronary artery calcifications are noted in the LAD  distribution. No evidence of pericardial effusion.     Bones: No acute osseous abnormalities are demonstrated.     CHEST WALL: There is a masslike density associated with the right breast  and correlation is recommended with the physical exam. This may simply  represent some asymmetric breast tissue that is certainly asymmetric  with the contralateral breast. There is an enlarged right axillary node  measuring 13 mm in short axis.     Lymph nodes: No enlarged mediastinal or left axillary adenopathy is  present.     Upper abdomen: The liver is cirrhotic in appearance. Cholelithiasis is  present. The spleen is enlarged suggesting portal hypertension. There  are suspected varicosities within the upper abdomen. 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 This appears to emanate from the proximal left splenic artery.       Impression:       1. No evidence of pulmonary thromboembolic disease.  2. Bibasilar atelectasis. No evidence of lobar pneumonia or effusion.  3. There is a suspected right breast mass with asymmetric soft tissue  density within the breast. There is no enlarged right axillary node.  Correlation is recommended with the patient's physical exam. If not  recently undertaken follow-up with outpatient mammography would be  suggested.  4. Cirrhotic changes of the liver with splenomegaly and suspected upper  abdominal varices. FINDINGS suggest portal hypertension. There is a 2.1  cm aneurysm which appears to emanate from the proximal splenic artery  within the anterior upper abdomen.  This report was finalized on 04/13/2019 19:07 by Dr. Ace Moncada MD.        Assessment/Plan       Atypical chest pain    Left upper quadrant pain    Left Leg and Breast pain     Splenic artery aneurysm (CMS/HCC)    Splenomegaly    Elevated Liver Enzymes    Lower Leg Edema    Hypertension    Cirrhosis  Plan:  Reportedly troponin elevated at outlying facility but has been normal on checks here. Labs from [facility] not available to me. I have asked these be obtained  Echo done and pending- if normal no further cardiac work up necessary  Vascular surgery consult pending  Monitor Telemetry     Further orders per Dr. Caceres    Thank you for asking us to follow this patient with you.     London Moore, DEISI  04/14/19  10:08 AM

## 2019-04-15 ENCOUNTER — APPOINTMENT (OUTPATIENT)
Dept: CT IMAGING | Facility: HOSPITAL | Age: 69
End: 2019-04-15

## 2019-04-15 ENCOUNTER — APPOINTMENT (OUTPATIENT)
Dept: INTERVENTIONAL RADIOLOGY/VASCULAR | Facility: HOSPITAL | Age: 69
End: 2019-04-15

## 2019-04-15 ENCOUNTER — APPOINTMENT (OUTPATIENT)
Dept: ULTRASOUND IMAGING | Facility: HOSPITAL | Age: 69
End: 2019-04-15

## 2019-04-15 ENCOUNTER — APPOINTMENT (OUTPATIENT)
Dept: GENERAL RADIOLOGY | Facility: HOSPITAL | Age: 69
End: 2019-04-15

## 2019-04-15 ENCOUNTER — ANESTHESIA (OUTPATIENT)
Dept: PERIOP | Facility: HOSPITAL | Age: 69
End: 2019-04-15

## 2019-04-15 ENCOUNTER — ANESTHESIA EVENT (OUTPATIENT)
Dept: PERIOP | Facility: HOSPITAL | Age: 69
End: 2019-04-15

## 2019-04-15 PROBLEM — D61.818 PANCYTOPENIA (HCC): Status: ACTIVE | Noted: 2019-04-15

## 2019-04-15 LAB
ALBUMIN SERPL-MCNC: 1.8 G/DL (ref 3.5–5)
ALBUMIN/GLOB SERPL: 0.6 G/DL (ref 1.1–2.5)
ALP SERPL-CCNC: 191 U/L (ref 24–120)
ALT SERPL W P-5'-P-CCNC: 27 U/L (ref 0–54)
AMMONIA BLD-SCNC: 81 UMOL/L (ref 9–33)
ANION GAP SERPL CALCULATED.3IONS-SCNC: 3 MMOL/L (ref 4–13)
AST SERPL-CCNC: 42 U/L (ref 7–45)
BILIRUB SERPL-MCNC: 2.4 MG/DL (ref 0.1–1)
BUN BLD-MCNC: 15 MG/DL (ref 5–21)
BUN/CREAT SERPL: 15.6 (ref 7–25)
CALCIUM SPEC-SCNC: 7.8 MG/DL (ref 8.4–10.4)
CHLORIDE SERPL-SCNC: 108 MMOL/L (ref 98–110)
CO2 SERPL-SCNC: 25 MMOL/L (ref 24–31)
CREAT BLD-MCNC: 0.96 MG/DL (ref 0.5–1.4)
DEPRECATED RDW RBC AUTO: 51.8 FL (ref 40–54)
ERYTHROCYTE [DISTWIDTH] IN BLOOD BY AUTOMATED COUNT: 16.1 % (ref 12–15)
GFR SERPL CREATININE-BSD FRML MDRD: 70 ML/MIN/1.73
GLOBULIN UR ELPH-MCNC: 2.8 GM/DL
GLUCOSE BLD-MCNC: 81 MG/DL (ref 70–100)
GLUCOSE BLDC GLUCOMTR-MCNC: 143 MG/DL (ref 70–130)
GLUCOSE BLDC GLUCOMTR-MCNC: 79 MG/DL (ref 70–130)
GLUCOSE BLDC GLUCOMTR-MCNC: 86 MG/DL (ref 70–130)
GLUCOSE BLDC GLUCOMTR-MCNC: 92 MG/DL (ref 70–130)
GLUCOSE BLDC GLUCOMTR-MCNC: 93 MG/DL (ref 70–130)
HCT VFR BLD AUTO: 27 % (ref 37–47)
HGB BLD-MCNC: 8.9 G/DL (ref 12–16)
MAGNESIUM SERPL-MCNC: 1.8 MG/DL (ref 1.4–2.2)
MCH RBC QN AUTO: 29.2 PG (ref 28–32)
MCHC RBC AUTO-ENTMCNC: 33 G/DL (ref 33–36)
MCV RBC AUTO: 88.5 FL (ref 82–98)
PLATELET # BLD AUTO: 74 10*3/MM3 (ref 130–400)
PMV BLD AUTO: 12 FL (ref 6–12)
POTASSIUM BLD-SCNC: 3.8 MMOL/L (ref 3.5–5.3)
PROT SERPL-MCNC: 4.6 G/DL (ref 6.3–8.7)
RBC # BLD AUTO: 3.05 10*6/MM3 (ref 4.2–5.4)
SODIUM BLD-SCNC: 136 MMOL/L (ref 135–145)
WBC NRBC COR # BLD: 3.19 10*3/MM3 (ref 4.8–10.8)

## 2019-04-15 PROCEDURE — C1760 CLOSURE DEV, VASC: HCPCS | Performed by: SURGERY

## 2019-04-15 PROCEDURE — G0378 HOSPITAL OBSERVATION PER HR: HCPCS

## 2019-04-15 PROCEDURE — 82140 ASSAY OF AMMONIA: CPT | Performed by: NURSE PRACTITIONER

## 2019-04-15 PROCEDURE — 25010000002 PROPOFOL 10 MG/ML EMULSION: Performed by: NURSE ANESTHETIST, CERTIFIED REGISTERED

## 2019-04-15 PROCEDURE — C1769 GUIDE WIRE: HCPCS | Performed by: SURGERY

## 2019-04-15 PROCEDURE — C1894 INTRO/SHEATH, NON-LASER: HCPCS | Performed by: SURGERY

## 2019-04-15 PROCEDURE — 93971 EXTREMITY STUDY: CPT

## 2019-04-15 PROCEDURE — 83735 ASSAY OF MAGNESIUM: CPT | Performed by: INTERNAL MEDICINE

## 2019-04-15 PROCEDURE — 75726 ARTERY X-RAYS ABDOMEN: CPT | Performed by: SURGERY

## 2019-04-15 PROCEDURE — C1725 CATH, TRANSLUMIN NON-LASER: HCPCS | Performed by: SURGERY

## 2019-04-15 PROCEDURE — 25010000002 HEPARIN (PORCINE) PER 1000 UNITS: Performed by: SURGERY

## 2019-04-15 PROCEDURE — 37236 OPEN/PERQ PLACE STENT 1ST: CPT | Performed by: SURGERY

## 2019-04-15 PROCEDURE — 75726 ARTERY X-RAYS ABDOMEN: CPT

## 2019-04-15 PROCEDURE — C1874 STENT, COATED/COV W/DEL SYS: HCPCS | Performed by: SURGERY

## 2019-04-15 PROCEDURE — 63710000001 INSULIN DETEMIR PER 5 UNITS: Performed by: NURSE PRACTITIONER

## 2019-04-15 PROCEDURE — 76000 FLUOROSCOPY <1 HR PHYS/QHP: CPT

## 2019-04-15 PROCEDURE — 25010000002 IOPAMIDOL 61 % SOLUTION: Performed by: SURGERY

## 2019-04-15 PROCEDURE — 36245 INS CATH ABD/L-EXT ART 1ST: CPT | Performed by: SURGERY

## 2019-04-15 PROCEDURE — 0 IOPAMIDOL PER 1 ML: Performed by: FAMILY MEDICINE

## 2019-04-15 PROCEDURE — 71275 CT ANGIOGRAPHY CHEST: CPT

## 2019-04-15 PROCEDURE — 94799 UNLISTED PULMONARY SVC/PX: CPT

## 2019-04-15 PROCEDURE — 25010000002 ONDANSETRON PER 1 MG

## 2019-04-15 PROCEDURE — 25010000002 ONDANSETRON PER 1 MG: Performed by: NURSE ANESTHETIST, CERTIFIED REGISTERED

## 2019-04-15 PROCEDURE — B41B1ZZ FLUOROSCOPY OF OTHER INTRA-ABDOMINAL ARTERIES USING LOW OSMOLAR CONTRAST: ICD-10-PCS | Performed by: SURGERY

## 2019-04-15 PROCEDURE — C1887 CATHETER, GUIDING: HCPCS | Performed by: SURGERY

## 2019-04-15 PROCEDURE — 25010000002 HEPARIN (PORCINE) PER 1000 UNITS: Performed by: NURSE ANESTHETIST, CERTIFIED REGISTERED

## 2019-04-15 PROCEDURE — 80053 COMPREHEN METABOLIC PANEL: CPT | Performed by: NURSE PRACTITIONER

## 2019-04-15 PROCEDURE — B4101ZZ FLUOROSCOPY OF ABDOMINAL AORTA USING LOW OSMOLAR CONTRAST: ICD-10-PCS | Performed by: SURGERY

## 2019-04-15 PROCEDURE — 85027 COMPLETE CBC AUTOMATED: CPT | Performed by: NURSE PRACTITIONER

## 2019-04-15 PROCEDURE — 93971 EXTREMITY STUDY: CPT | Performed by: SURGERY

## 2019-04-15 PROCEDURE — 04V43DZ RESTRICTION OF SPLENIC ARTERY WITH INTRALUMINAL DEVICE, PERCUTANEOUS APPROACH: ICD-10-PCS | Performed by: SURGERY

## 2019-04-15 PROCEDURE — 25010000002 FENTANYL CITRATE (PF) 100 MCG/2ML SOLUTION: Performed by: NURSE ANESTHETIST, CERTIFIED REGISTERED

## 2019-04-15 PROCEDURE — 71045 X-RAY EXAM CHEST 1 VIEW: CPT

## 2019-04-15 PROCEDURE — 82962 GLUCOSE BLOOD TEST: CPT

## 2019-04-15 PROCEDURE — 25010000002 NEOSTIGMINE PER 0.5 MG: Performed by: NURSE ANESTHETIST, CERTIFIED REGISTERED

## 2019-04-15 DEVICE — STENTGR ENDOPROSTH VIABAHN RO HEP 6F 6MM 5X120CM: Type: IMPLANTABLE DEVICE | Site: ABDOMEN | Status: FUNCTIONAL

## 2019-04-15 RX ORDER — MIDAZOLAM HYDROCHLORIDE 1 MG/ML
2 INJECTION INTRAMUSCULAR; INTRAVENOUS
Status: DISCONTINUED | OUTPATIENT
Start: 2019-04-15 | End: 2019-04-15 | Stop reason: HOSPADM

## 2019-04-15 RX ORDER — NALOXONE HCL 0.4 MG/ML
0.4 VIAL (ML) INJECTION AS NEEDED
Status: DISCONTINUED | OUTPATIENT
Start: 2019-04-15 | End: 2019-04-15 | Stop reason: HOSPADM

## 2019-04-15 RX ORDER — ROCURONIUM BROMIDE 10 MG/ML
INJECTION, SOLUTION INTRAVENOUS AS NEEDED
Status: DISCONTINUED | OUTPATIENT
Start: 2019-04-15 | End: 2019-04-15 | Stop reason: SURG

## 2019-04-15 RX ORDER — FENTANYL CITRATE 50 UG/ML
25 INJECTION, SOLUTION INTRAMUSCULAR; INTRAVENOUS AS NEEDED
Status: DISCONTINUED | OUTPATIENT
Start: 2019-04-15 | End: 2019-04-15 | Stop reason: HOSPADM

## 2019-04-15 RX ORDER — SODIUM CHLORIDE 9 MG/ML
50 INJECTION, SOLUTION INTRAVENOUS CONTINUOUS
Status: DISCONTINUED | OUTPATIENT
Start: 2019-04-15 | End: 2019-04-16

## 2019-04-15 RX ORDER — BUPIVACAINE HYDROCHLORIDE 5 MG/ML
INJECTION, SOLUTION EPIDURAL; INTRACAUDAL AS NEEDED
Status: DISCONTINUED | OUTPATIENT
Start: 2019-04-15 | End: 2019-04-15 | Stop reason: HOSPADM

## 2019-04-15 RX ORDER — LIDOCAINE HYDROCHLORIDE 20 MG/ML
INJECTION, SOLUTION INFILTRATION; PERINEURAL AS NEEDED
Status: DISCONTINUED | OUTPATIENT
Start: 2019-04-15 | End: 2019-04-15 | Stop reason: SURG

## 2019-04-15 RX ORDER — IPRATROPIUM BROMIDE AND ALBUTEROL SULFATE 2.5; .5 MG/3ML; MG/3ML
3 SOLUTION RESPIRATORY (INHALATION) ONCE AS NEEDED
Status: DISCONTINUED | OUTPATIENT
Start: 2019-04-15 | End: 2019-04-15 | Stop reason: HOSPADM

## 2019-04-15 RX ORDER — MIDAZOLAM HYDROCHLORIDE 1 MG/ML
1 INJECTION INTRAMUSCULAR; INTRAVENOUS
Status: DISCONTINUED | OUTPATIENT
Start: 2019-04-15 | End: 2019-04-15 | Stop reason: HOSPADM

## 2019-04-15 RX ORDER — METOCLOPRAMIDE HYDROCHLORIDE 5 MG/ML
5 INJECTION INTRAMUSCULAR; INTRAVENOUS
Status: DISCONTINUED | OUTPATIENT
Start: 2019-04-15 | End: 2019-04-15 | Stop reason: HOSPADM

## 2019-04-15 RX ORDER — HYDROCODONE BITARTRATE AND ACETAMINOPHEN 5; 325 MG/1; MG/1
1 TABLET ORAL EVERY 4 HOURS PRN
Status: DISCONTINUED | OUTPATIENT
Start: 2019-04-15 | End: 2019-04-17 | Stop reason: HOSPADM

## 2019-04-15 RX ORDER — FENTANYL CITRATE 50 UG/ML
INJECTION, SOLUTION INTRAMUSCULAR; INTRAVENOUS AS NEEDED
Status: DISCONTINUED | OUTPATIENT
Start: 2019-04-15 | End: 2019-04-15 | Stop reason: SURG

## 2019-04-15 RX ORDER — GLYCOPYRROLATE 0.2 MG/ML
INJECTION INTRAMUSCULAR; INTRAVENOUS AS NEEDED
Status: DISCONTINUED | OUTPATIENT
Start: 2019-04-15 | End: 2019-04-15 | Stop reason: SURG

## 2019-04-15 RX ORDER — OXYCODONE HYDROCHLORIDE AND ACETAMINOPHEN 5; 325 MG/1; MG/1
1 TABLET ORAL ONCE AS NEEDED
Status: DISCONTINUED | OUTPATIENT
Start: 2019-04-15 | End: 2019-04-15 | Stop reason: HOSPADM

## 2019-04-15 RX ORDER — ONDANSETRON 2 MG/ML
INJECTION INTRAMUSCULAR; INTRAVENOUS
Status: COMPLETED
Start: 2019-04-15 | End: 2019-04-15

## 2019-04-15 RX ORDER — OXYCODONE AND ACETAMINOPHEN 7.5; 325 MG/1; MG/1
1 TABLET ORAL EVERY 4 HOURS PRN
Status: DISCONTINUED | OUTPATIENT
Start: 2019-04-15 | End: 2019-04-17 | Stop reason: HOSPADM

## 2019-04-15 RX ORDER — PROPOFOL 10 MG/ML
VIAL (ML) INTRAVENOUS AS NEEDED
Status: DISCONTINUED | OUTPATIENT
Start: 2019-04-15 | End: 2019-04-15 | Stop reason: SURG

## 2019-04-15 RX ORDER — LACTULOSE 20 G/30ML
20 SOLUTION ORAL 3 TIMES DAILY
Status: DISCONTINUED | OUTPATIENT
Start: 2019-04-15 | End: 2019-04-17 | Stop reason: HOSPADM

## 2019-04-15 RX ORDER — ONDANSETRON 2 MG/ML
4 INJECTION INTRAMUSCULAR; INTRAVENOUS EVERY 6 HOURS PRN
Status: DISCONTINUED | OUTPATIENT
Start: 2019-04-15 | End: 2019-04-17 | Stop reason: HOSPADM

## 2019-04-15 RX ORDER — CLOPIDOGREL BISULFATE 75 MG/1
75 TABLET ORAL DAILY
Status: DISCONTINUED | OUTPATIENT
Start: 2019-04-16 | End: 2019-04-17 | Stop reason: HOSPADM

## 2019-04-15 RX ORDER — ONDANSETRON 4 MG/1
4 TABLET, FILM COATED ORAL EVERY 6 HOURS PRN
Status: DISCONTINUED | OUTPATIENT
Start: 2019-04-15 | End: 2019-04-17 | Stop reason: HOSPADM

## 2019-04-15 RX ORDER — SODIUM CHLORIDE 0.9 % (FLUSH) 0.9 %
1-10 SYRINGE (ML) INJECTION AS NEEDED
Status: DISCONTINUED | OUTPATIENT
Start: 2019-04-15 | End: 2019-04-15 | Stop reason: HOSPADM

## 2019-04-15 RX ORDER — ONDANSETRON 2 MG/ML
4 INJECTION INTRAMUSCULAR; INTRAVENOUS ONCE AS NEEDED
Status: DISCONTINUED | OUTPATIENT
Start: 2019-04-15 | End: 2019-04-15 | Stop reason: HOSPADM

## 2019-04-15 RX ORDER — ACETAMINOPHEN 325 MG/1
650 TABLET ORAL EVERY 4 HOURS PRN
Status: DISCONTINUED | OUTPATIENT
Start: 2019-04-15 | End: 2019-04-17 | Stop reason: HOSPADM

## 2019-04-15 RX ORDER — LIDOCAINE HYDROCHLORIDE 40 MG/ML
SOLUTION TOPICAL AS NEEDED
Status: DISCONTINUED | OUTPATIENT
Start: 2019-04-15 | End: 2019-04-15 | Stop reason: SURG

## 2019-04-15 RX ORDER — LABETALOL HYDROCHLORIDE 5 MG/ML
5 INJECTION, SOLUTION INTRAVENOUS
Status: DISCONTINUED | OUTPATIENT
Start: 2019-04-15 | End: 2019-04-15 | Stop reason: HOSPADM

## 2019-04-15 RX ORDER — BUPIVACAINE HCL/0.9 % NACL/PF 0.1 %
2 PLASTIC BAG, INJECTION (ML) EPIDURAL ONCE
Status: COMPLETED | OUTPATIENT
Start: 2019-04-15 | End: 2019-04-15

## 2019-04-15 RX ORDER — MEPERIDINE HYDROCHLORIDE 25 MG/ML
12.5 INJECTION INTRAMUSCULAR; INTRAVENOUS; SUBCUTANEOUS
Status: DISCONTINUED | OUTPATIENT
Start: 2019-04-15 | End: 2019-04-15 | Stop reason: HOSPADM

## 2019-04-15 RX ORDER — CLOPIDOGREL BISULFATE 75 MG/1
150 TABLET ORAL ONCE
Status: COMPLETED | OUTPATIENT
Start: 2019-04-15 | End: 2019-04-15

## 2019-04-15 RX ORDER — SODIUM CHLORIDE, SODIUM LACTATE, POTASSIUM CHLORIDE, CALCIUM CHLORIDE 600; 310; 30; 20 MG/100ML; MG/100ML; MG/100ML; MG/100ML
100 INJECTION, SOLUTION INTRAVENOUS CONTINUOUS
Status: DISCONTINUED | OUTPATIENT
Start: 2019-04-15 | End: 2019-04-15

## 2019-04-15 RX ORDER — VASOPRESSIN 20 U/ML
INJECTION PARENTERAL AS NEEDED
Status: DISCONTINUED | OUTPATIENT
Start: 2019-04-15 | End: 2019-04-15 | Stop reason: SURG

## 2019-04-15 RX ORDER — ONDANSETRON 2 MG/ML
INJECTION INTRAMUSCULAR; INTRAVENOUS AS NEEDED
Status: DISCONTINUED | OUTPATIENT
Start: 2019-04-15 | End: 2019-04-15 | Stop reason: SURG

## 2019-04-15 RX ORDER — HEPARIN SODIUM 1000 [USP'U]/ML
INJECTION, SOLUTION INTRAVENOUS; SUBCUTANEOUS AS NEEDED
Status: DISCONTINUED | OUTPATIENT
Start: 2019-04-15 | End: 2019-04-15 | Stop reason: SURG

## 2019-04-15 RX ORDER — SODIUM CHLORIDE 0.9 % (FLUSH) 0.9 %
3 SYRINGE (ML) INJECTION EVERY 12 HOURS SCHEDULED
Status: DISCONTINUED | OUTPATIENT
Start: 2019-04-15 | End: 2019-04-15 | Stop reason: HOSPADM

## 2019-04-15 RX ADMIN — PROPOFOL 50 MG: 10 INJECTION, EMULSION INTRAVENOUS at 14:37

## 2019-04-15 RX ADMIN — HEPARIN SODIUM 4000 UNITS: 1000 INJECTION, SOLUTION INTRAVENOUS; SUBCUTANEOUS at 14:05

## 2019-04-15 RX ADMIN — ONDANSETRON 4 MG: 2 INJECTION INTRAMUSCULAR; INTRAVENOUS at 15:31

## 2019-04-15 RX ADMIN — VASOPRESSIN 1 UNITS: 20 INJECTION INTRAVENOUS at 13:55

## 2019-04-15 RX ADMIN — Medication 2 G: at 13:46

## 2019-04-15 RX ADMIN — LIDOCAINE 2 PATCH: 50 PATCH CUTANEOUS at 08:38

## 2019-04-15 RX ADMIN — INSULIN DETEMIR 5 UNITS: 100 INJECTION, SOLUTION SUBCUTANEOUS at 20:18

## 2019-04-15 RX ADMIN — FUROSEMIDE 40 MG: 40 TABLET ORAL at 17:27

## 2019-04-15 RX ADMIN — VASOPRESSIN 1 UNITS: 20 INJECTION INTRAVENOUS at 13:46

## 2019-04-15 RX ADMIN — LEVOTHYROXINE SODIUM 88 MCG: 88 TABLET ORAL at 06:27

## 2019-04-15 RX ADMIN — LIDOCAINE HYDROCHLORIDE 100 MG: 20 INJECTION, SOLUTION INFILTRATION; PERINEURAL at 13:41

## 2019-04-15 RX ADMIN — IOPAMIDOL 100 ML: 755 INJECTION, SOLUTION INTRAVENOUS at 17:56

## 2019-04-15 RX ADMIN — FUROSEMIDE 40 MG: 40 TABLET ORAL at 08:30

## 2019-04-15 RX ADMIN — CLOPIDOGREL 150 MG: 75 TABLET, FILM COATED ORAL at 15:59

## 2019-04-15 RX ADMIN — ONDANSETRON HYDROCHLORIDE 4 MG: 2 SOLUTION INTRAMUSCULAR; INTRAVENOUS at 14:33

## 2019-04-15 RX ADMIN — SODIUM CHLORIDE, PRESERVATIVE FREE 3 ML: 5 INJECTION INTRAVENOUS at 20:19

## 2019-04-15 RX ADMIN — PROPOFOL 50 MG: 10 INJECTION, EMULSION INTRAVENOUS at 14:43

## 2019-04-15 RX ADMIN — ROCURONIUM BROMIDE 30 MG: 10 INJECTION INTRAVENOUS at 13:41

## 2019-04-15 RX ADMIN — ROSUVASTATIN CALCIUM 20 MG: 20 TABLET, FILM COATED ORAL at 20:18

## 2019-04-15 RX ADMIN — LACTULOSE 20 G: 20 SOLUTION ORAL at 20:18

## 2019-04-15 RX ADMIN — GLYCOPYRROLATE 0.4 MG: 0.2 INJECTION, SOLUTION INTRAMUSCULAR; INTRAVENOUS at 14:33

## 2019-04-15 RX ADMIN — CARVEDILOL 3.12 MG: 3.12 TABLET, FILM COATED ORAL at 20:18

## 2019-04-15 RX ADMIN — FENTANYL CITRATE 100 MCG: 50 INJECTION, SOLUTION INTRAMUSCULAR; INTRAVENOUS at 13:41

## 2019-04-15 RX ADMIN — Medication 3 MG: at 14:33

## 2019-04-15 RX ADMIN — LIDOCAINE HYDROCHLORIDE 1 EACH: 40 SOLUTION TOPICAL at 13:42

## 2019-04-15 RX ADMIN — CARVEDILOL 3.12 MG: 3.12 TABLET, FILM COATED ORAL at 08:30

## 2019-04-15 RX ADMIN — EPHEDRINE SULFATE 10 MG: 50 INJECTION INTRAMUSCULAR; INTRAVENOUS; SUBCUTANEOUS at 14:14

## 2019-04-15 RX ADMIN — VASOPRESSIN 1 UNITS: 20 INJECTION INTRAVENOUS at 14:01

## 2019-04-15 RX ADMIN — SODIUM CHLORIDE, POTASSIUM CHLORIDE, SODIUM LACTATE AND CALCIUM CHLORIDE 100 ML/HR: 600; 310; 30; 20 INJECTION, SOLUTION INTRAVENOUS at 12:53

## 2019-04-15 RX ADMIN — VASOPRESSIN 1 UNITS: 20 INJECTION INTRAVENOUS at 14:05

## 2019-04-15 RX ADMIN — LACTULOSE 20 G: 20 SOLUTION ORAL at 08:30

## 2019-04-15 RX ADMIN — PROPOFOL 130 MG: 10 INJECTION, EMULSION INTRAVENOUS at 13:41

## 2019-04-15 RX ADMIN — EPHEDRINE SULFATE 15 MG: 50 INJECTION INTRAMUSCULAR; INTRAVENOUS; SUBCUTANEOUS at 14:19

## 2019-04-15 RX ADMIN — HEPARIN SODIUM 1000 UNITS: 1000 INJECTION, SOLUTION INTRAVENOUS; SUBCUTANEOUS at 13:57

## 2019-04-15 NOTE — PROGRESS NOTES
[facility] Medicine Services  INPATIENT PROGRESS NOTE    Length of Stay: 0  Date of Admission: 4/13/2019  Primary Care Physician: See Fritz DO    Subjective   Chief Complaint: Follow-up pain  HPI   The patient is resting in bed.  There is no family at bedside at present.  She tells me she is feeling okay today, but is still having left chest wall/flank pain.  She had no nausea or vomiting and tolerated clear liquids well yesterday.  She is more alert and talkative this morning.    Review of Systems   All pertinent negatives and positives are as above. All other systems have been reviewed and are negative unless otherwise stated.     Objective    Temp:  [97.3 °F (36.3 °C)-98.9 °F (37.2 °C)] 97.3 °F (36.3 °C)  Heart Rate:  [65-72] 67  Resp:  [16-18] 16  BP: (100-129)/(54-68) 129/63  Physical Exam  Constitutional: She is oriented to person, place, and time. She appears well-developed and well-nourished. No distress.   Chronically ill-appearing   HENT:   Head: Normocephalic and atraumatic.   Neck: Normal range of motion. Neck supple. No JVD present. No tracheal deviation present.   Cardiovascular: Normal rate, regular rhythm, normal heart sounds and intact distal pulses. Exam reveals no gallop and no friction rub.   Normal sinus rhythm 67-73  Pulmonary/Chest: Effort normal and breath sounds normal. She has no wheezes. She has no rales.   Abdominal: Soft. Bowel sounds are normal. She exhibits no distension. There is tenderness (Left flank/chest wall tenderness).   Musculoskeletal: Normal range of motion. She exhibits edema (Moderate BLE edema, Mild LUE edema) and tenderness (Left chest wall tenderness).   Neurological: She is alert and oriented to person, place, and time. No cranial nerve deficit.   Skin: Skin is warm and dry. No rash noted. No erythema.   Vitiligo   Psychiatric: She has a normal mood and affect. Her behavior is normal. Judgment and thought content  normal.   Vitals reviewed.    Results Review:  I have reviewed the labs, radiology results, and diagnostic studies.    Laboratory Data:   Results from last 7 days   Lab Units 04/15/19  0814 04/14/19  0024 04/13/19  1724   WBC 10*3/mm3 3.19* 3.18* 3.48*   HEMOGLOBIN g/dL 8.9* 9.5* 10.8*   HEMATOCRIT % 27.0* 28.4* 32.9*   PLATELETS 10*3/mm3 74* 72* 78*     Results from last 7 days   Lab Units 04/15/19  0814 04/14/19  0316 04/13/19  2037 04/13/19  1724   SODIUM mmol/L 136 139  --  139   POTASSIUM mmol/L 3.8 3.9  --  3.8   CHLORIDE mmol/L 108 109  --  108   CO2 mmol/L 25.0 26.0  --  29.0   BUN mg/dL 15 13  --  11   CREATININE mg/dL 0.96 0.82  --  0.84   CALCIUM mg/dL 7.8* 8.1*  --  8.5   BILIRUBIN mg/dL 2.4* 2.1* 2.5*  --    ALK PHOS U/L 191* 249* 312*  --    ALT (SGPT) U/L 27 27 30  --    AST (SGOT) U/L 42 47* 60*  --    GLUCOSE mg/dL 81 128*  --  98     Radiology Data:   Imaging Results (last 24 hours)     Procedure Component Value Units Date/Time    US Abdomen Complete [[id number]] Collected:  04/14/19 1329     Updated:  04/14/19 1339    Narrative:       History:  68-year-old with splenic aneurysm.      Reference:  CTA chest 1 day prior.     Findings:  Real time sonography of the abdomen was performed.     Cirrhotic liver. There is a patent splenic artery aneurysm correlated  with yesterday's CT. This measures 2.1 cm. Cholelithiasis without wall  thickening or adjacent edema. No biliary dilatation. Common bile duct  diameter 7 mm.     Spleen is mildly enlarged measuring up to 14.5 cm.     Pancreas is not well assessed by ultrasound.     Left nephrolithiasis is suspected. Otherwise both kidneys are normal in  size, contour, and cortical thickness/echogenicity. Right renal length  11.5 cm. Left renal length 11.4 cm.     Urinary bladder decompressed by Mcdonald catheter.     Atherosclerotic abdominal aorta without aneurysm. Visualized IVC is  unremarkable.          Impression:          1. Patent 2.1 cm proximal splenic  artery aneurysm.  2. Cholelithiasis without evidence of cholecystitis.  3. Cirrhosis with splenomegaly.  This report was finalized on 04/14/2019 13:35 by Dr Julius Page, .        Results for orders placed during the hospital encounter of 04/13/19   Adult Transthoracic Echo Complete W/ Cont if Necessary Per Protocol    Narrative · Left ventricular wall thickness is consistent with mild concentric   hypertrophy.  · Left ventricular systolic function is normal. Estimated ejection   fraction is 66-70%.  · Normal right ventricular cavity size and systolic function noted.        I have reviewed the patient current medications.     Assessment/Plan     Active Hospital Problems    Diagnosis   • Pancytopenia (CMS/HCC)   • Left upper quadrant pain   • Splenic artery aneurysm (CMS/HCC)   • Splenomegaly   • Elevated troponin   • Liver cirrhosis (CMS/HCC)   • Diabetes mellitus (CMS/HCC)   • Disease of thyroid gland   • Normocytic anemia   • Chest pain     Plan:  1.  Appreciate vascular surgery.  Plan for endovascular repair of the splenic artery aneurysm today.  2.  I did speak with the radiologist who reexamined the CT angiogram of her chest yesterday and does not identify any rib fractures.  Certainly her splenic artery aneurysm could be the cause of her pain, but this is located more in the midline.  Continue lidocaine patches for pain.  3.  Right upper quadrant ultrasound does identify cholelithiasis without evidence of cholecystitis.  She can have a HIDA scan performed on an outpatient basis.  Perhaps her cholelithiasis is causing referred pain to the left side.  4.  She will need outpatient mammogram for reports of a right breast mass on CTA.  No identifiable mass by physical exam.  5.  Her ammonia level is improved today, but only one reported stool yesterday.  Increase her lactulose to 3 times per day and monitor her output for a goal of at least 3 bowel movements per day.  6.  Echocardiogram with results as above, results  are normal.  She will not need any further cardiac workup per cardiology at this time.  Appreciate their assistance.  7.  Left upper extremity is mildly edematous, have encouraged patient to keep this elevated on a pillow.  Will check a venous doppler ultrasound as well.  8.  Okay for cardiac, consistent carbohydrate diet following surgery today if okay with Dr. Orosco.  9.  Physical and occupational therapy consults pending  10.  Last Blood glucoses-100, 92, 81.  Continue sliding scale.  Will resume her total insulin at a decreased dosage at night only.  11.  Labs in AM    Discharge Planning: I expect the patient to be discharged to home in 1-2 days.    DEISI Garcia   04/15/19   9:35 AM      I personally evaluated and examined the patient in conjunction with DEISI Blair and agree with the assessment, treatment plan, and disposition of the patient as recorded by her. My history, exam, and further recommendations are: I have reviewed and agree with the plans. Mesfin.

## 2019-04-15 NOTE — PLAN OF CARE
Problem: Patient Care Overview  Goal: Plan of Care Review  Outcome: Ongoing (interventions implemented as appropriate)   04/15/19 7160   Coping/Psychosocial   Plan of Care Reviewed With patient   Plan of Care Review   Progress no change   OTHER   Outcome Summary no co pain. pt had doppler of left arm was neg. had stent placed to her splenic artery per right groin. drg dry and intact. cont to monitor.      Goal: Individualization and Mutuality  Outcome: Ongoing (interventions implemented as appropriate)    Goal: Discharge Needs Assessment  Outcome: Ongoing (interventions implemented as appropriate)    Goal: Interprofessional Rounds/Family Conf  Outcome: Ongoing (interventions implemented as appropriate)      Problem: Fall Risk (Adult)  Goal: Absence of Fall  Outcome: Ongoing (interventions implemented as appropriate)      Problem: Cardiac: ACS (Acute Coronary Syndrome) (Adult)  Goal: Signs and Symptoms of Listed Potential Problems Will be Absent, Minimized or Managed (Cardiac: ACS)  Outcome: Ongoing (interventions implemented as appropriate)      Problem: Skin Injury Risk (Adult)  Goal: Skin Health and Integrity  Outcome: Ongoing (interventions implemented as appropriate)

## 2019-04-15 NOTE — PLAN OF CARE
Problem: Patient Care Overview  Goal: Plan of Care Review  Outcome: Ongoing (interventions implemented as appropriate)   04/15/19 0652   Coping/Psychosocial   Plan of Care Reviewed With patient   Plan of Care Review   Progress no change   OTHER   Outcome Summary VSS, c/o pain with movement and deep breathing, NPO for fixation of aneurysm, IV fluids continued, family at bedside, will continue to monitor for changes.       Problem: Fall Risk (Adult)  Goal: Absence of Fall  Outcome: Ongoing (interventions implemented as appropriate)   04/15/19 0652   Fall Risk (Adult)   Absence of Fall achieves outcome       Problem: Cardiac: ACS (Acute Coronary Syndrome) (Adult)  Goal: Signs and Symptoms of Listed Potential Problems Will be Absent, Minimized or Managed (Cardiac: ACS)  Outcome: Ongoing (interventions implemented as appropriate)   04/15/19 0652   Goal/Outcome Evaluation   Problems Assessed (Acute Coronary Syndrome) all   Problems Present (Acute Coronary Syn) situational response       Problem: Skin Injury Risk (Adult)  Goal: Identify Related Risk Factors and Signs and Symptoms  Outcome: Outcome(s) achieved Date Met: 04/15/19   04/15/19 0652   Skin Injury Risk (Adult)   Related Risk Factors (Skin Injury Risk) mobility impaired;edema     Goal: Skin Health and Integrity  Outcome: Ongoing (interventions implemented as appropriate)   04/15/19 0652   Skin Injury Risk (Adult)   Skin Health and Integrity making progress toward outcome

## 2019-04-15 NOTE — ANESTHESIA PROCEDURE NOTES
Airway  Urgency: elective    Airway not difficult    General Information and Staff    Patient location during procedure: OR  CRNA: Yoon Bhatia CRNA    Indications and Patient Condition  Indications for airway management: airway protection    Preoxygenated: yes  Mask difficulty assessment: 1 - vent by mask    Final Airway Details  Final airway type: endotracheal airway      Successful airway: ETT  Cuffed: yes   Successful intubation technique: direct laryngoscopy  Facilitating devices/methods: intubating stylet  Endotracheal tube insertion site: oral  Blade: Casas  Blade size: 2  ETT size (mm): 7.5  Cormack-Lehane Classification: grade I - full view of glottis  Placement verified by: chest auscultation and capnometry   Cuff volume (mL): 8  Measured from: lips  ETT to lips (cm): 22  Number of attempts at approach: 1    Additional Comments  Atraumatic

## 2019-04-15 NOTE — ANESTHESIA PREPROCEDURE EVALUATION
Anesthesia Evaluation     Patient summary reviewed   no history of anesthetic complications:  NPO Solid Status: > 8 hours  NPO Liquid Status: > 8 hours           Airway   Mallampati: II  TM distance: >3 FB  No difficulty expected  Dental    (+) edentulous    Pulmonary    (-) asthma, sleep apnea, not a smoker  Cardiovascular     ECG reviewed    (+) hypertension, PVD (splenic artery aneurysm),     ROS comment: Echo:  ·Left ventricular wall thickness is consistent with mild concentric hypertrophy.  ·Left ventricular systolic function is normal. Estimated ejection fraction is 66-70%.  ·Normal right ventricular cavity size and systolic function noted    Neuro/Psych  (-) seizures, TIA, CVA  GI/Hepatic/Renal/Endo    (+)   liver disease (cirrhosis), diabetes mellitus using insulin, hypothyroidism,   (-) no renal disease    Musculoskeletal     Abdominal    Substance History      OB/GYN          Other        ROS/Med Hx Other: thrombocytopenia                  Anesthesia Plan    ASA 3     MAC     intravenous induction   Anesthetic plan, all risks, benefits, and alternatives have been provided, discussed and informed consent has been obtained with: patient.

## 2019-04-15 NOTE — OP NOTE
Daniel Oliver  4/13/2019 - 4/15/2019     PREOPERATIVE DIAGNOSIS: Splenic artery aneurysm (CMS/HCC) [I72.8]     POSTOPERATIVE DIAGNOSIS: Post-Op Diagnosis Codes:     * Splenic artery aneurysm (CMS/HCC) [I72.8]     PROCEDURE PERFORMED:   1.  Introduction of catheter/sheath into the aorta  2.  Aortoiliac angiogram with radiographic supervision and interpretation  3.  Selective cannulation of the celiac trunk with a 6.5 Indonesian Aptus  steerable sheath  4.  Selective cannulation of the splenic artery  5.  Placement of 6 mm x 5 cm Viabahn stent graft across the saccular splenic artery aneurysm  6.  Postdilatation with a 5 x 40 mm Crawford balloon  7.  Minx closure       SURGEON: Joel Orosco DO      ANESTHESIA: General.    PREPARATION: Routine.    STAFF: Circulator: Iliana Montoya RN  Scrub Person: Blaine Gan; Yoli Chaidez  Assistant: Hiral Knox  Vascular Radiology Technician: Ev Serna    ESTIMATED BLOOD LOSS: 5 mL    SPECIMENS: None    COMPLICATIONS: None    INDICATIONS: Daniel Oliver is a 68 y.o. female with history of hypertension, type II diabetes, Hypothyroidism, and Liver cirrhosis. Patient presented to an outlying facility for left sided flank pain from left leg up to left breast. She denied any chest pain. There was report of an elevated troponin of 0.125. However the 4 troponins checked here have been normal. EKG reviewed with no acute abnormality. 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 The indications, risks, and possible complications of the procedure were explained to the patient, who voiced understanding and wished to proceed with surgery.     PROCEDURE IN DETAIL: The patient was taken to the operating room and placed on the operating  table in a supine position. After general anesthesia was obtained, the bilateral groins was prepped and draped in a sterile manner.  5 cc of 0.5% Marcaine plain was used to infiltrate the right groin for local anesthesia.  Using a micropuncture technique the right common femoral artery was cannulated and a micro-sheath was placed.  The advantage Glidewire was advanced into the aorta and a short 5 Zimbabwean introducer sheath was placed.  The patient was given 1000 units of intravenous heparin.  The Omni Flush catheter was advanced into the aorta and an aortoiliac angiogram was performed.  Findings are as follows:  1.  Patent celiac trunk without stenosis  2.  Patent splenic artery with large saccular proximal splenic artery aneurysm  3.  Patent SMA without stenosis  4.  Patent renal arteries bilaterally without stenosis  5.  Patent aorta without stenosis    At this point, the decision was made to selectively cannulate the celiac trunk.  A 6.5 Zimbabwean by 55 cm Aptus tourguide steerable sheath was placed over the wire up to the level of the celiac artery.  Patient was given 4000 units of intravenous heparin.  With the help of the steerable sheath and the Donald cross catheter the celiac trunk was selectively cannulated.  The wire and catheter were placed out into the splenic artery under fluoroscopic guidance.  A wire exchange was performed for an 018 Gladius wire.  An angiogram was performed to identify the proximal and distal landing zones.  A 6 mm x 5 cm Viabahn stent graft was placed over the wire and across the saccular aneurysm.  It was successfully deployed.  A 5 x 40 mm Dillon balloon was used for postdilatation.  Completion angiogram was performed which showed complete exclusion of the saccular aneurysm and rapid flow through the splenic artery.  At this point, I felt no further intervention was warranted.  The sheath and wire were removed.  A minx was used to seal off the right common femoral artery.  Direct  pressure was held for an additional 10-15 minutes to help ensure hemostasis.  Sterile dressings were applied. The patient tolerated the procedure well. Sponge and needle counts were correct. The patient was then awakened and extubated in the operating room and taken to the recovery room in good condition.    Joel Orosco DO  Date: 4/15/2019 Time: 2:41 PM     CC:See Fritz DO

## 2019-04-16 LAB
ALBUMIN SERPL-MCNC: 2 G/DL (ref 3.5–5)
ALBUMIN/GLOB SERPL: 0.7 G/DL (ref 1.1–2.5)
ALP SERPL-CCNC: 197 U/L (ref 24–120)
ALT SERPL W P-5'-P-CCNC: 30 U/L (ref 0–54)
AMMONIA BLD-SCNC: 43 UMOL/L (ref 9–33)
ANION GAP SERPL CALCULATED.3IONS-SCNC: 5 MMOL/L (ref 4–13)
AST SERPL-CCNC: 55 U/L (ref 7–45)
BILIRUB SERPL-MCNC: 2.1 MG/DL (ref 0.1–1)
BUN BLD-MCNC: 15 MG/DL (ref 5–21)
BUN/CREAT SERPL: 16 (ref 7–25)
CALCIUM SPEC-SCNC: 8.1 MG/DL (ref 8.4–10.4)
CHLORIDE SERPL-SCNC: 107 MMOL/L (ref 98–110)
CO2 SERPL-SCNC: 25 MMOL/L (ref 24–31)
CREAT BLD-MCNC: 0.94 MG/DL (ref 0.5–1.4)
DEPRECATED RDW RBC AUTO: 51.1 FL (ref 40–54)
ERYTHROCYTE [DISTWIDTH] IN BLOOD BY AUTOMATED COUNT: 15.8 % (ref 12–15)
GFR SERPL CREATININE-BSD FRML MDRD: 72 ML/MIN/1.73
GLOBULIN UR ELPH-MCNC: 3 GM/DL
GLUCOSE BLD-MCNC: 118 MG/DL (ref 70–100)
GLUCOSE BLDC GLUCOMTR-MCNC: 124 MG/DL (ref 70–130)
GLUCOSE BLDC GLUCOMTR-MCNC: 139 MG/DL (ref 70–130)
GLUCOSE BLDC GLUCOMTR-MCNC: 166 MG/DL (ref 70–130)
GLUCOSE BLDC GLUCOMTR-MCNC: 186 MG/DL (ref 70–130)
HCT VFR BLD AUTO: 29 % (ref 37–47)
HGB BLD-MCNC: 9.4 G/DL (ref 12–16)
MCH RBC QN AUTO: 28.6 PG (ref 28–32)
MCHC RBC AUTO-ENTMCNC: 32.4 G/DL (ref 33–36)
MCV RBC AUTO: 88.1 FL (ref 82–98)
PLATELET # BLD AUTO: 82 10*3/MM3 (ref 130–400)
PMV BLD AUTO: 11.5 FL (ref 6–12)
POTASSIUM BLD-SCNC: 3.6 MMOL/L (ref 3.5–5.3)
PROT SERPL-MCNC: 5 G/DL (ref 6.3–8.7)
RBC # BLD AUTO: 3.29 10*6/MM3 (ref 4.2–5.4)
SODIUM BLD-SCNC: 137 MMOL/L (ref 135–145)
WBC NRBC COR # BLD: 7.07 10*3/MM3 (ref 4.8–10.8)

## 2019-04-16 PROCEDURE — 94799 UNLISTED PULMONARY SVC/PX: CPT

## 2019-04-16 PROCEDURE — 82962 GLUCOSE BLOOD TEST: CPT

## 2019-04-16 PROCEDURE — 97161 PT EVAL LOW COMPLEX 20 MIN: CPT | Performed by: PHYSICAL THERAPIST

## 2019-04-16 PROCEDURE — 82140 ASSAY OF AMMONIA: CPT | Performed by: NURSE PRACTITIONER

## 2019-04-16 PROCEDURE — 85027 COMPLETE CBC AUTOMATED: CPT | Performed by: NURSE PRACTITIONER

## 2019-04-16 PROCEDURE — 63710000001 INSULIN LISPRO (HUMAN) PER 5 UNITS: Performed by: INTERNAL MEDICINE

## 2019-04-16 PROCEDURE — 80053 COMPREHEN METABOLIC PANEL: CPT | Performed by: NURSE PRACTITIONER

## 2019-04-16 PROCEDURE — 97535 SELF CARE MNGMENT TRAINING: CPT | Performed by: OCCUPATIONAL THERAPIST

## 2019-04-16 PROCEDURE — 97166 OT EVAL MOD COMPLEX 45 MIN: CPT | Performed by: OCCUPATIONAL THERAPIST

## 2019-04-16 PROCEDURE — 94760 N-INVAS EAR/PLS OXIMETRY 1: CPT

## 2019-04-16 PROCEDURE — 25010000002 ONDANSETRON PER 1 MG: Performed by: SURGERY

## 2019-04-16 RX ORDER — NYSTATIN 100000 [USP'U]/G
POWDER TOPICAL EVERY 12 HOURS SCHEDULED
Status: DISCONTINUED | OUTPATIENT
Start: 2019-04-16 | End: 2019-04-17 | Stop reason: HOSPADM

## 2019-04-16 RX ORDER — SPIRONOLACTONE 25 MG/1
25 TABLET ORAL DAILY
Status: DISCONTINUED | OUTPATIENT
Start: 2019-04-16 | End: 2019-04-17 | Stop reason: HOSPADM

## 2019-04-16 RX ADMIN — ONDANSETRON 4 MG: 2 INJECTION INTRAMUSCULAR; INTRAVENOUS at 19:02

## 2019-04-16 RX ADMIN — NYSTATIN: 100000 POWDER TOPICAL at 20:37

## 2019-04-16 RX ADMIN — LACTULOSE 20 G: 20 SOLUTION ORAL at 09:00

## 2019-04-16 RX ADMIN — CLOPIDOGREL 75 MG: 75 TABLET, FILM COATED ORAL at 17:30

## 2019-04-16 RX ADMIN — FUROSEMIDE 40 MG: 40 TABLET ORAL at 17:31

## 2019-04-16 RX ADMIN — LACTULOSE 20 G: 20 SOLUTION ORAL at 20:36

## 2019-04-16 RX ADMIN — HYDROCODONE BITARTRATE AND ACETAMINOPHEN 1 TABLET: 5; 325 TABLET ORAL at 09:22

## 2019-04-16 RX ADMIN — LACTULOSE 20 G: 20 SOLUTION ORAL at 17:30

## 2019-04-16 RX ADMIN — FUROSEMIDE 40 MG: 40 TABLET ORAL at 09:01

## 2019-04-16 RX ADMIN — SODIUM CHLORIDE, PRESERVATIVE FREE 3 ML: 5 INJECTION INTRAVENOUS at 20:37

## 2019-04-16 RX ADMIN — NYSTATIN: 100000 POWDER TOPICAL at 11:25

## 2019-04-16 RX ADMIN — SODIUM CHLORIDE, PRESERVATIVE FREE 3 ML: 5 INJECTION INTRAVENOUS at 09:00

## 2019-04-16 RX ADMIN — CARVEDILOL 3.12 MG: 3.12 TABLET, FILM COATED ORAL at 20:36

## 2019-04-16 RX ADMIN — LEVOTHYROXINE SODIUM 88 MCG: 88 TABLET ORAL at 05:26

## 2019-04-16 RX ADMIN — INSULIN LISPRO 2 UNITS: 100 INJECTION, SOLUTION INTRAVENOUS; SUBCUTANEOUS at 17:30

## 2019-04-16 RX ADMIN — ASPIRIN 325 MG: 325 TABLET, COATED ORAL at 09:01

## 2019-04-16 RX ADMIN — ROSUVASTATIN CALCIUM 20 MG: 20 TABLET, FILM COATED ORAL at 20:36

## 2019-04-16 RX ADMIN — INSULIN LISPRO 2 UNITS: 100 INJECTION, SOLUTION INTRAVENOUS; SUBCUTANEOUS at 20:36

## 2019-04-16 RX ADMIN — SPIRONOLACTONE 25 MG: 25 TABLET ORAL at 09:06

## 2019-04-16 RX ADMIN — CARVEDILOL 3.12 MG: 3.12 TABLET, FILM COATED ORAL at 09:01

## 2019-04-16 NOTE — PLAN OF CARE
Problem: Patient Care Overview  Goal: Plan of Care Review  Outcome: Ongoing (interventions implemented as appropriate)   04/16/19 0321   Coping/Psychosocial   Plan of Care Reviewed With patient;spouse   Plan of Care Review   Progress improving   OTHER   Outcome Summary VSS. No c/o pain. Stent to spleenic artery via right groin. Site CDI with dsg in place. Continue to monitor.     Goal: Individualization and Mutuality  Outcome: Ongoing (interventions implemented as appropriate)   04/16/19 0321   Individualization   Patient Specific Goals (Include Timeframe) No falls   Patient Specific Interventions Safety precautions       Problem: Fall Risk (Adult)  Goal: Absence of Fall  Outcome: Ongoing (interventions implemented as appropriate)   04/16/19 0321   Fall Risk (Adult)   Absence of Fall achieves outcome       Problem: Cardiac: ACS (Acute Coronary Syndrome) (Adult)  Goal: Signs and Symptoms of Listed Potential Problems Will be Absent, Minimized or Managed (Cardiac: ACS)  Outcome: Ongoing (interventions implemented as appropriate)   04/16/19 0321   Goal/Outcome Evaluation   Problems Assessed (Acute Coronary Syndrome) all   Problems Present (Acute Coronary Syn) situational response       Problem: Skin Injury Risk (Adult)  Goal: Skin Health and Integrity  Outcome: Ongoing (interventions implemented as appropriate)   04/16/19 0321   Skin Injury Risk (Adult)   Skin Health and Integrity making progress toward outcome

## 2019-04-16 NOTE — PLAN OF CARE
Problem: Patient Care Overview  Goal: Plan of Care Review  Outcome: Ongoing (interventions implemented as appropriate)   04/16/19 3417   Coping/Psychosocial   Plan of Care Reviewed With patient   Plan of Care Review   Progress no change   OTHER   Outcome Summary OT eval completed. Pt. completed bed mobility at [facility], required Min A for t/fs & ambulation & was dep in LBD. She has had a decline in ADL but son is extremely supportive & eager to assist. Anticipate DC home with HH vs. outpatient.

## 2019-04-16 NOTE — THERAPY EVALUATION
Acute Care - Physical Therapy Initial Evaluation  [facility]     Patient Name: Daniel Oliver  : 1950  MRN: [id number]  Today's Date: 2019   Onset of Illness/Injury or Date of Surgery: 19  Date of Referral to PT: 19  Referring Physician: DEISI Edwards      Admit Date: 2019    Visit Dx:     ICD-10-CM ICD-9-CM   1. Chest pain, unspecified type R07.9 786.50   2. Splenic artery aneurysm (CMS/HCC) I72.8 442.83   3. Impaired functional mobility, balance, gait, and endurance Z74.09 V49.89     Patient Active Problem List   Diagnosis   • Chest pain   • Left upper quadrant pain   • Splenic artery aneurysm (CMS/HCC)   • Splenomegaly   • Elevated troponin   • Liver cirrhosis (CMS/HCC)   • Diabetes mellitus (CMS/HCC)   • Disease of thyroid gland   • Normocytic anemia   • Pancytopenia (CMS/HCC)     Past Medical History:   Diagnosis Date   • Diabetes mellitus (CMS/HCC)    • Disease of thyroid gland    • Hypertension    • Liver cirrhosis (CMS/HCC)      Past Surgical History:   Procedure Laterality Date   • ARTERIOGRAM MESENTERIC Right 4/15/2019    Procedure: DIAGNOSTIC ARTERIOGRAM MESENTERIC WITH STENT PLACEMENT;  Surgeon: Joel Orosco DO;  Location: [place] [address];  Service: Vascular   • HIP ARTHROPLASTY          PT ASSESSMENT (last 12 hours)      Physical Therapy Evaluation     Row Name 19 1316          PT Evaluation Time/Intention    Subjective Information  complains of;pain  -SB     Document Type  evaluation  -SB     Mode of Treatment  physical therapy  -SB     Patient Effort  good  -SB     Symptoms Noted During/After Treatment  fatigue  -SB     Row Name 19 1316          General Information    Patient Profile Reviewed?  yes  -SB     Onset of Illness/Injury or Date of Surgery  19  -SB     Referring Physician  DEISI Edwards  -SB     Patient Observations  alert;agree to therapy;cooperative  -SB     Patient/Family Observations  son present  -SB     General Observations of  Patient  fowlers, no apparent distress  -SB     Prior Level of Function  min assist:;all household mobility;transfer;bed mobility  -SB     Equipment Currently Used at Home  shower chair;walker, rolling;commode, 3-in-1;wheelchair  -SB     Pertinent History of Current Functional Problem  Dx: Splenic artery aneurysm , Splenomegaly, Elevated troponin, underwent stent to splenic a. via R groin performed by Dr. Orosco  -SB     Existing Precautions/Restrictions  fall  -SB     Equipment Issued to Patient  walker, front wheeled;gait belt  -SB     Risks Reviewed  patient:;LOB;nausea/vomiting;dizziness;change in vital signs;increased discomfort;increased drainage;lines disloged;family:  -SB     Benefits Reviewed  patient:;improve function;family:;increase independence;increase strength;increase balance;decrease pain;decrease risk of DVT;improve skin integrity;increase knowledge  -SB     Row Name 04/16/19 1316          Relationship/Environment    Primary Source of Support/Comfort  child(shorty)  -SB     Lives With  spouse  -SB     Row Name 04/16/19 1316          Resource/Environmental Concerns    Current Living Arrangements  home/apartment/condo  -SB     Resource/Environmental Concerns  none  -SB     Row Name 04/16/19 1316          Home Main Entrance    Number of Stairs, Main Entrance  three  -SB     Stair Railings, Main Entrance  railing on right side (ascending)  -SB     Row Name 04/16/19 1316          Cognitive Assessment/Interventions    Additional Documentation  Cognitive Assessment/Intervention (Group)  -SB     Row Name 04/16/19 1316          Cognitive Assessment/Intervention- PT/OT    Affect/Mental Status (Cognitive)  WFL  -SB     Follows Commands (Cognition)  follows one step commands;over 90% accuracy;delayed response/completion;increased processing time needed;initiation impaired;physical/tactile prompts required;repetition of directions required  -SB     Cognitive Function (Cognitive)  safety deficit  -SB     Safety  Deficit (Cognitive)  at risk behavior observed;ability to follow commands;problem solving  -SB     Personal Safety Interventions  fall prevention program maintained;gait belt;muscle strengthening facilitated;nonskid shoes/slippers when out of bed;supervised activity  -SB     Row Name 04/16/19 1316          Safety Issues, Functional Mobility    Safety Issues Affecting Function (Mobility)  ability to follow commands;at risk behavior observed;positioning of assistive device;judgment;problem solving  -SB     Impairments Affecting Function (Mobility)  balance;endurance/activity tolerance;pain;shortness of breath;strength  -SB     Row Name 04/16/19 1316          Bed Mobility Assessment/Treatment    Bed Mobility Assessment/Treatment  supine-sit;sit-supine  -SB     Supine-Sit Weld (Bed Mobility)  contact guard  -SB     Sit-Supine Weld (Bed Mobility)  minimum assist (75% patient effort)  -SB     Bed Mobility, Safety Issues  decreased use of legs for bridging/pushing;decreased use of arms for pushing/pulling  -SB     Assistive Device (Bed Mobility)  head of bed elevated;bed rails  -SB     Row Name 04/16/19 1316          Transfer Assessment/Treatment    Transfer Assessment/Treatment  sit-stand transfer;stand-sit transfer  -SB     Sit-Stand Weld (Transfers)  minimum assist (75% patient effort)  -SB     Stand-Sit Weld (Transfers)  minimum assist (75% patient effort)  -SB     Row Name 04/16/19 1316          Sit-Stand Transfer    Assistive Device (Sit-Stand Transfers)  walker, front-wheeled  -SB     Row Name 04/16/19 1316          Stand-Sit Transfer    Assistive Device (Stand-Sit Transfers)  walker, front-wheeled  -SB     Row Name 04/16/19 1316          Gait/Stairs Assessment/Training    Gait/Stairs Assessment/Training  gait/ambulation independence;gait/ambulation assistive device;distance ambulated  -SB     [place] Level (Gait)  contact guard;minimum assist (75% patient effort) min A for  correcting posterior lean during gait  -SB     Assistive Device (Gait)  walker, front-wheeled  -SB     Distance in Feet (Gait)  30  -SB     Pattern (Gait)  step-through  -SB     Deviations/Abnormal Patterns (Gait)  sukhdeep decreased;festinating/shuffling;stride length decreased;antalgic  -SB     Comment (Gait/Stairs)  pt exhibited a posterior lean 3x during gait in which she needed min A to recover; pt also has severe valgus deformity of LLE which influences her balance and step length   -SB     Row Name 04/16/19 1316          General ROM    GENERAL ROM COMMENTS  BLE WFL  -SB     Row Name 04/16/19 1316          MMT (Manual Muscle Testing)    General MMT Comments  RLE 4/5; LLE 4-/5  -SB     Row Name 04/16/19 1316          Sensory Assessment/Intervention    Sensory General Assessment  no sensation deficits identified  -SB     Row Name 04/16/19 1316          Pain Scale: Numbers Pre/Post-Treatment    Pain Scale: Numbers, Pretreatment  2/10  -SB     Pain Location  abdomen  -SB     Row Name             Wound 04/15/19 1434 Right groin incision    Wound - Properties Group Date first assessed: 04/15/19  -AC Time first assessed: 1434  -AC Side: Right  -AC Location: groin  -AC Type: incision  -AC    Row Name 04/16/19 1316          Plan of Care Review    Plan of Care Reviewed With  patient;son  -SB     Row Name 04/16/19 1316          Physical Therapy Clinical Impression    Date of Referral to PT  04/14/19  -SB     Patient/Family Goals Statement (PT Clinical Impression)  return home  -SB     Criteria for Skilled Interventions Met (PT Clinical Impression)  yes  -SB     Rehab Potential (PT Clinical Summary)  good, to achieve stated therapy goals  -SB     Predicted Duration of Therapy (PT)  until d/c  -SB     Care Plan Review (PT)  evaluation/treatment results reviewed;care plan/treatment goals reviewed;risks/benefits reviewed;current/potential barriers reviewed;patient/other agree to care plan  -SB     Care Plan Review, Other  Participant (PT Clinical Impression)  son  -SB     Row Name 04/16/19 1316          Physical Therapy Goals    Bed Mobility Goal Selection (PT)  bed mobility, PT goal 1  -SB     Transfer Goal Selection (PT)  transfer, PT goal 1  -SB     Gait Training Goal Selection (PT)  gait training, PT goal 1  -SB     Row Name 04/16/19 1316          Bed Mobility Goal 1 (PT)    Activity/Assistive Device (Bed Mobility Goal 1, PT)  sit to supine;supine to sit  -SB     [place] Level/Cues Needed (Bed Mobility Goal 1, PT)  supervision required  -SB     Time Frame (Bed Mobility Goal 1, PT)  by discharge  -SB     Progress/Outcomes (Bed Mobility Goal 1, PT)  goal ongoing  -SB     Row Name 04/16/19 1316          Transfer Goal 1 (PT)    Activity/Assistive Device (Transfer Goal 1, PT)  sit-to-stand/stand-to-sit;walker, rolling  -SB     [place] Level/Cues Needed (Transfer Goal 1, PT)  contact guard assist  -SB     Time Frame (Transfer Goal 1, PT)  by discharge  -SB     Progress/Outcome (Transfer Goal 1, PT)  goal ongoing  -SB     Row Name 04/16/19 1316          Gait Training Goal 1 (PT)    Activity/Assistive Device (Gait Training Goal 1, PT)  gait (walking locomotion);improve balance and speed;decrease fall risk;diminish gait deviation;decrease asymmetrical patterns;walker, rolling  -SB     [place] Level (Gait Training Goal 1, PT)  contact guard assist  -SB     Distance (Gait Goal 1, PT)  75  -SB     Time Frame (Gait Training Goal 1, PT)  by discharge  -SB     Progress/Outcome (Gait Training Goal 1, PT)  goal ongoing  -SB     Row Name 04/16/19 1316          Positioning and Restraints    Pre-Treatment Position  in bed  -SB     Post Treatment Position  bed  -SB     In Bed  fowlers;call light within reach;encouraged to call for assist;SCD pump applied;with family/caregiver  -SB     Row Name 04/16/19 1316          Living Environment    Home Accessibility  stairs to enter home  -SB       User Key  (r) = Recorded By, (t) = Taken By,  (c) = Cosigned By    Initials Name Provider Type    AC Iliana Montoya, RN Registered Nurse    Kasey Montenegro PT Physical Therapist        Physical Therapy Education     Title: PT OT SLP Therapies (Done)     Topic: Physical Therapy (Done)     Point: Mobility training (Done)     Learning Progress Summary           Patient Acceptance, E, VU by SB at 4/16/2019  2:51 PM    Comment:  pt educated on POC, benefits of activity and d/c plans                   Point: Home exercise program (Done)     Learning Progress Summary           Patient Acceptance, E, VU by SB at 4/16/2019  2:51 PM    Comment:  pt educated on POC, benefits of activity and d/c plans                   Point: Body mechanics (Done)     Learning Progress Summary           Patient Acceptance, E, VU by SB at 4/16/2019  2:51 PM    Comment:  pt educated on POC, benefits of activity and d/c plans                   Point: Precautions (Done)     Learning Progress Summary           Patient Acceptance, E, VU by SB at 4/16/2019  2:51 PM    Comment:  pt educated on POC, benefits of activity and d/c plans                               User Key     Initials Effective Dates Name Provider Type Discipline     08/31/18 -  Kasey Brunson PT Physical Therapist PT              PT Recommendation and Plan  Anticipated Discharge Disposition (PT): home with home health, home with 24/7 care, skilled nursing facility  Planned Therapy Interventions (PT Eval): gait training, bed mobility training, balance training, home exercise program, patient/family education, ROM (range of motion), stair training, strengthening, transfer training  Therapy Frequency (PT Clinical Impression): 2 times/day  Outcome Summary/Treatment Plan (PT)  Anticipated Discharge Disposition (PT): home with home health, home with 24/7 care, skilled nursing facility  Plan of Care Reviewed With: patient  Progress: no change(eval day)  Outcome Summary: PT eval completed. Pt performed sup to sit with CGA,  increased time and verbal cues for LE and UE placement. Pt with posterior lean during MMT that reqd CGA. Pt with marked decrease in strength throughout LLE that was reported to be there prior to this hospitalization. Pt ambulated to doorway with CGA and RW. Pt exhibited decreased foot clearance, decreased step length and shuffling gait pattern, with significant valgus deformity of LLE. Pt will benefit from skilled PT to improve functional mobility and strength. Recommend d/c to SNF vs home with home health.   Outcome Measures     Row Name 04/16/19 1400             How much help from another person do you currently need...    Turning from your back to your side while in flat bed without using bedrails?  3  -SB      Moving from lying on back to sitting on the side of a flat bed without bedrails?  3  -SB      Moving to and from a bed to a chair (including a wheelchair)?  3  -SB      Standing up from a chair using your arms (e.g., wheelchair, bedside chair)?  3  -SB      Climbing 3-5 steps with a railing?  1  -SB      To walk in hospital room?  3  -SB      AM-PAC 6 Clicks Score  16  -SB         Functional Assessment    Outcome Measure Options  AM-PAC 6 Clicks Basic Mobility (PT)  -SB        User Key  (r) = Recorded By, (t) = Taken By, (c) = Cosigned By    Initials Name Provider Type    Kasey Montenegro PT Physical Therapist         Time Calculation:   PT Charges     Row Name 04/16/19 1504             Time Calculation    Start Time  1342  -SB      Stop Time  1431  -SB      Time Calculation (min)  49 min  -SB      PT Received On  04/16/19  -SB      PT Goal Re-Cert Due Date  04/26/19  -SB        User Key  (r) = Recorded By, (t) = Taken By, (c) = Cosigned By    Initials Name Provider Type    Kasey Montenegro PT Physical Therapist        Therapy Charges for Today     Code Description Service Date Service Provider Modifiers Qty    [id number] HC PT EVAL LOW COMPLEXITY 3 4/16/2019 Kasey Brunson PT GP 1          PT  G-Codes  Outcome Measure Options: AM-PAC 6 Clicks Basic Mobility (PT)  AM-PAC 6 Clicks Score: 16      Kasey Brunson PT  4/16/2019

## 2019-04-16 NOTE — THERAPY EVALUATION
Acute Care - Occupational Therapy Initial Evaluation  [facility]     Patient Name: Daniel Oliver  : 1950  MRN: [id number]  Today's Date: 2019  Onset of Illness/Injury or Date of Surgery: 19  Date of Referral to OT: 04/15/19  Referring Physician: DEISI Edwards    Admit Date: 2019       ICD-10-CM ICD-9-CM   1. Chest pain, unspecified type R07.9 786.50   2. Splenic artery aneurysm (CMS/HCC) I72.8 442.83   3. Impaired functional mobility, balance, gait, and endurance Z74.09 V49.89     Patient Active Problem List   Diagnosis   • Chest pain   • Left upper quadrant pain   • Splenic artery aneurysm (CMS/HCC)   • Splenomegaly   • Elevated troponin   • Liver cirrhosis (CMS/HCC)   • Diabetes mellitus (CMS/HCC)   • Disease of thyroid gland   • Normocytic anemia   • Pancytopenia (CMS/HCC)     Past Medical History:   Diagnosis Date   • Diabetes mellitus (CMS/HCC)    • Disease of thyroid gland    • Hypertension    • Liver cirrhosis (CMS/HCC)      Past Surgical History:   Procedure Laterality Date   • ARTERIOGRAM MESENTERIC Right 4/15/2019    Procedure: DIAGNOSTIC ARTERIOGRAM MESENTERIC WITH STENT PLACEMENT;  Surgeon: Joel Orosco DO;  Location: [place] [address];  Service: Vascular   • HIP ARTHROPLASTY            OT ASSESSMENT FLOWSHEET (last 12 hours)      Occupational Therapy Evaluation     Row Name 19 1315                   OT Evaluation Time/Intention    Subjective Information  complains of;pain  -CH        Document Type  evaluation  -CH        Mode of Treatment  occupational therapy  -           General Information    Patient Profile Reviewed?  yes  -        Onset of Illness/Injury or Date of Surgery  19  -        Referring Physician  DEISI Mai  -KENNY        Patient Observations  alert;cooperative;agree to therapy  -        Patient/Family Observations  son present  -        General Observations of Patient  fowlers, bilat SCDs  -        Prior Level of Function  min  assist:;transfer;bed mobility;mod assist:;dressing S for toileting  -        Equipment Currently Used at Home  shower chair;walker, rolling;commode, 3-in-1;wheelchair  -        Pertinent History of Current Functional Problem  Dx: Splenic artery aneurysm , Splenomegaly, Elevated troponin, underwent stent to splenic a. via R groin performed by Dr. Orosco  -        Existing Precautions/Restrictions  fall  -        Risks Reviewed  patient and family:;increased discomfort;LOB  -        Benefits Reviewed  patient and family:;improve function;increase independence;increase strength;increase balance  -        Barriers to Rehab  medically complex;previous functional deficit  -           Relationship/Environment    Primary Source of Support/Comfort  child(shorty)  -        Lives With  spouse  -           Resource/Environmental Concerns    Current Living Arrangements  home/apartment/condo  -           Home Main Entrance    Number of Stairs, Main Entrance  three  -        Stair Railings, Main Entrance  railing on right side (ascending)  -           Cognitive Assessment/Intervention- PT/OT    Affect/Mental Status (Cognitive)  confused;flat/blunted affect  -        Orientation Status (Cognition)  oriented to;place;person  -        Follows Commands (Cognition)  follows one step commands;75-90% accuracy  -        Cognitive Function (Cognitive)  safety deficit  -        Safety Deficit (Cognitive)  at risk behavior observed;problem solving;judgment;insight into deficits/self awareness  -        Personal Safety Interventions  fall prevention program maintained;gait belt;muscle strengthening facilitated;nonskid shoes/slippers when out of bed;supervised activity  -           Safety Issues, Functional Mobility    Safety Issues Affecting Function (Mobility)  awareness of need for assistance;insight into deficits/self awareness;judgment;positioning of assistive device  -        Impairments Affecting  Function (Mobility)  balance;endurance/activity tolerance;postural/trunk control;strength  -           Bed Mobility Assessment/Treatment    Bed Mobility Assessment/Treatment  supine-sit;sit-supine  -        Supine-Sit [place] (Bed Mobility)  contact guard  -        Sit-Supine [place] (Bed Mobility)  moderate assist (50% patient effort);verbal cues  -        Bed Mobility, Safety Issues  decreased use of legs for bridging/pushing  -        Assistive Device (Bed Mobility)  head of bed elevated;bed rails  -           Functional Mobility    Functional Mobility- Ind. Level  contact guard assist;minimum assist (75% patient effort)  -        Functional Mobility- Device  rolling walker  -        Functional Mobility- Safety Issues  weight-shifting ability decreased;step length decreased;balance decreased during turns;loses balance backward  -        Functional Mobility- Comment  in room to hallway  -           Transfer Assessment/Treatment    Transfer Assessment/Treatment  sit-stand transfer;stand-sit transfer  -           Sit-Stand Transfer    Sit-Stand [place] (Transfers)  minimum assist (75% patient effort)  -        Assistive Device (Sit-Stand Transfers)  walker, front-wheeled  [facility]           Stand-Sit Transfer    Stand-Sit [place] (Transfers)  minimum assist (75% patient effort)  -        Assistive Device (Stand-Sit Transfers)  walker, front-wheeled  -           ADL Assessment/Intervention    BADL Assessment/Intervention  lower body dressing  -           Lower Body Dressing Assessment/Training    Lower Body Dressing [place] Level  dependent (less than 25% patient effort);don;socks  -        Lower Body Dressing Position  supine  -           BADL Safety/Performance    Impairments, BADL Safety/Performance  balance;cognition;endurance/activity tolerance;pain;strength;trunk/postural control  -           General ROM    GENERAL ROM COMMENTS  BUE AROM WFL  -            MMT (Manual Muscle Testing)    General MMT Comments  BUEs 4-/5  -CH           Motor Assessment/Interventions    Additional Documentation  Balance (Group)  -           Balance    Balance  static sitting balance  -           Static Sitting Balance    Level of [place] (Unsupported Sitting, Static Balance)  contact guard assist;minimal assist, 75% patient effort  -           Positioning and Restraints    Pre-Treatment Position  in bed  -CH        Post Treatment Position  bed  -CH        In Bed  fowlers;call light within reach;encouraged to call for assist;with family/caregiver;side rails up x2;legs elevated;R heel elevated;L heel elevated;SCD pump applied  -           Pain Assessment    Additional Documentation  Pain Scale: Numbers Pre/Post-Treatment (Group)  -CH           Pain Scale: Numbers Pre/Post-Treatment    Pain Scale: Numbers, Pretreatment  2/10  -CH        Pain Scale: Numbers, Post-Treatment  2/10  -CH        Pain Location  abdomen  -CH           Wound 04/15/19 1434 Right groin incision    Wound - Properties Group Date first assessed: 04/15/19  -AC Time first assessed: 1434  -AC Side: Right  -AC Location: groin  -AC Type: incision  -AC       Plan of Care Review    Plan of Care Reviewed With  patient;son  -CH           Clinical Impression (OT)    Date of Referral to OT  04/15/19  -        OT Diagnosis  decline in ADLs  -        Criteria for Skilled Therapeutic Interventions Met (OT Eval)  yes;no problems identified which require skilled intervention;treatment indicated  -        Rehab Potential (OT Eval)  fair, will monitor progress closely  -        Therapy Frequency (OT Eval)  5 times/wk  -        Care Plan Review (OT)  evaluation/treatment results reviewed;care plan/treatment goals reviewed;risks/benefits reviewed;current/potential barriers reviewed;patient/other agree to care plan  -        Care Plan Review, Other Participant (OT Eval)  son  -        Anticipated Discharge  Disposition (OT)  home with assist;home with home health;home with OP services;skilled nursing facility  -           Planned OT Interventions    Planned Therapy Interventions (OT Eval)  activity tolerance training;functional balance retraining;patient/caregiver education/training;strengthening exercise;transfer/mobility retraining;BADL retraining;occupation/activity based interventions  -           OT Goals    Transfer Goal Selection (OT)  transfer, OT goal 1  -        Toileting Goal Selection (OT)  toileting, OT goal 1  -           Transfer Goal 1 (OT)    Activity/Assistive Device (Transfer Goal 1, OT)  sit-to-stand/stand-to-sit;bed-to-chair/chair-to-bed;commode, 3-in-1;walker, rolling  -        [place] Level/Cues Needed (Transfer Goal 1, OT)  supervision required;verbal cues required  -        Time Frame (Transfer Goal 1, OT)  long term goal (LTG);by discharge  -        Barriers (Transfers Goal 1, OT)  .  -        Progress/Outcome (Transfer Goal 1, OT)  goal ongoing  -           Toileting Goal 1 (OT)    Activity/Device (Toileting Goal 1, OT)  toileting skills, all;adjust/manage clothing;perform perineal hygiene;commode, 3-in-1  -        [place] Level/Cues Needed (Toileting Goal 1, OT)  minimum assist (75% or more patient effort);verbal cues required  -        Time Frame (Toileting Goal 1, OT)  long term goal (LTG);by discharge  -        Barriers (Toileting Goal 1, OT)  .  -        Progress/Outcome (Toileting Goal 1, OT)  goal ongoing  -           Living Environment    Home Accessibility  stairs to enter home walk in shower  -          User Key  (r) = Recorded By, (t) = Taken By, (c) = Cosigned By    Initials Name Effective Dates     Renetta Garrison OTLINDA/L 08/02/16 -     AC Iliana Montoya RN 08/02/16 -                OT Recommendation and Plan  Outcome Summary/Treatment Plan (OT)  Anticipated Discharge Disposition (OT): home with assist, home with home health, home with  OP services, skilled nursing facility  Planned Therapy Interventions (OT Eval): activity tolerance training, functional balance retraining, patient/caregiver education/training, strengthening exercise, transfer/mobility retraining, BADL retraining, occupation/activity based interventions  Therapy Frequency (OT Eval): 5 times/wk  Plan of Care Review  Plan of Care Reviewed With: patient  Plan of Care Reviewed With: patient  Outcome Summary: OT eval completed.  Pt. completed bed mobility at [facility], required Min A for t/fs & ambulation & was dep in LBD.  She has had a decline in ADL but son is extremely supportive & eager to assist.  Anticipate DC home with HH vs. outpatient.      Outcome Measures     Row Name 04/16/19 1500 04/16/19 1400          How much help from another person do you currently need...    Turning from your back to your side while in flat bed without using bedrails?  --  3  -SB     Moving from lying on back to sitting on the side of a flat bed without bedrails?  --  3  -SB     Moving to and from a bed to a chair (including a wheelchair)?  --  3  -SB     Standing up from a chair using your arms (e.g., wheelchair, bedside chair)?  --  3  -SB     Climbing 3-5 steps with a railing?  --  1  -SB     To walk in hospital room?  --  3  -SB     AM-PAC 6 Clicks Score  --  16  -SB        How much help from another is currently needed...    Putting on and taking off regular lower body clothing?  1  -CH  --     Bathing (including washing, rinsing, and drying)  2  -CH  --     Toileting (which includes using toilet bed pan or urinal)  2  -CH  --     Putting on and taking off regular upper body clothing  3  -CH  --     Taking care of personal grooming (such as brushing teeth)  4  -CH  --     Eating meals  4  -CH  --     Score  16  -CH  --        Functional Assessment    Outcome Measure Options  AM-PAC 6 Clicks Daily Activity (OT)  -CH  AM-PAC 6 Clicks Basic Mobility (PT)  -SB       User Key  (r) = Recorded By, (t) =  Taken By, (c) = Cosigned By    Initials Name Provider Type     Renetta Garrison, OTR/L Occupational Therapist    Kasey Montenegro, PT Physical Therapist          Time Calculation:   Time Calculation- OT     Row Name 04/16/19 1315             Time Calculation- OT    OT Start Time  1315 add 20 from previous day  -CH      OT Stop Time  1422  -      OT Time Calculation (min)  67 min  -      Total Timed Code Minutes- OT  27 minute(s)  -      OT Received On  04/16/19  -      OT Goal Re-Cert Due Date  04/26/19  -        User Key  (r) = Recorded By, (t) = Taken By, (c) = Cosigned By    Initials Name Provider Type     Renetta Garrison, OTR/L Occupational Therapist        Therapy Charges for Today     Code Description Service Date Service Provider Modifiers Qty    05648304247 HC OT SELF CARE/MGMT/TRAIN EA 15 MIN 4/16/2019 Renetta Garrison OTR/L GO 2    90763959707 HC OT EVAL MOD COMPLEXITY 4 4/16/2019 Renetta Garrison OTR/L GO 1               Renetta Garrison OTR/L  4/16/2019

## 2019-04-16 NOTE — PLAN OF CARE
Problem: Patient Care Overview  Goal: Plan of Care Review  Outcome: Ongoing (interventions implemented as appropriate)   04/16/19 4641   Coping/Psychosocial   Plan of Care Reviewed With patient   Plan of Care Review   Progress no change  (eval day)   OTHER   Outcome Summary PT eval completed. Pt performed sup to sit with CGA, increased time and verbal cues for LE and UE placement. Pt with posterior lean during MMT that reqd CGA. Pt with marked decrease in strength throughout LLE that was reported to be there prior to this hospitalization. Pt ambulated to doorway with CGA and RW. Pt exhibited decreased foot clearance, decreased step length and shuffling gait pattern, with significant valgus deformity of LLE. Pt will benefit from skilled PT to improve functional mobility and strength. Recommend d/c to SNF vs home with home health.

## 2019-04-16 NOTE — PROGRESS NOTES
[facility] Medicine Services  INPATIENT PROGRESS NOTE    Patient Name: Daniel Oliver  Date of Admission: 4/13/2019  Today's Date: 04/16/19  Length of Stay: 0  Primary Care Physician: See Fritz DO    Subjective   Chief Complaint: follow up splenic artery aneurysm  HPI   Pt is awake and alert. States her abdominal pain is gone. States no bowel movement yesterday. No chest pain or shortness of breath No family at bedside. States she uses a walker at home and she has not been out of bed yet.    Review of Systems   All pertinent negatives and positives are as above. All other systems have been reviewed and are negative unless otherwise stated.     Objective    Temp:  [97.1 °F (36.2 °C)-98.6 °F (37 °C)] 98.6 °F (37 °C)  Heart Rate:  [64-78] 69  Resp:  [12-20] 16  BP: (132-157)/(64-79) 148/64  Physical Exam   Constitutional: She is oriented to person, place, and time. She appears well-developed.   Chronically ill appearing.    HENT:   Head: Normocephalic and atraumatic.   Eyes: Conjunctivae and EOM are normal. Pupils are equal, round, and reactive to light.   Neck: Neck supple. No JVD present. No thyromegaly present.   Cardiovascular: Normal rate, regular rhythm, normal heart sounds and intact distal pulses. Exam reveals no gallop and no friction rub.   No murmur heard.  Sinus 69-84   Pulmonary/Chest: Effort normal and breath sounds normal. No respiratory distress. She has no wheezes. She has no rales. She exhibits no tenderness.   Abdominal: Soft. Bowel sounds are normal. She exhibits no distension. There is no tenderness. There is no rebound and no guarding.   Musculoskeletal: Normal range of motion. She exhibits edema (1-2+ edema bilateral lower extremitieis up to her hip area. ). She exhibits no tenderness or deformity.   Left upper extremity edema as well.    Lymphadenopathy:     She has no cervical adenopathy.   Neurological: She is alert and oriented to person,  place, and time. She displays normal reflexes. No cranial nerve deficit. She exhibits normal muscle tone.   No asterixis noted.    Skin: Skin is warm and dry. No rash noted.   Psychiatric: She has a normal mood and affect. Her behavior is normal. Judgment and thought content normal.     Results Review:  I have reviewed the labs, radiology results, and diagnostic studies.    Laboratory Data:   Results from last 7 days   Lab Units 04/16/19  0600 04/15/19  0814 04/14/19  0024   WBC 10*3/mm3 7.07 3.19* 3.18*   HEMOGLOBIN g/dL 9.4* 8.9* 9.5*   HEMATOCRIT % 29.0* 27.0* 28.4*   PLATELETS 10*3/mm3 82* 74* 72*        Results from last 7 days   Lab Units 04/16/19  0600 04/15/19  0814 04/14/19  0316   SODIUM mmol/L 137 136 139   POTASSIUM mmol/L 3.6 3.8 3.9   CHLORIDE mmol/L 107 108 109   CO2 mmol/L 25.0 25.0 26.0   BUN mg/dL 15 15 13   CREATININE mg/dL 0.94 0.96 0.82   CALCIUM mg/dL 8.1* 7.8* 8.1*   BILIRUBIN mg/dL 2.1* 2.4* 2.1*   ALK PHOS U/L 197* 191* 249*   ALT (SGPT) U/L 30 27 27   AST (SGOT) U/L 55* 42 47*   GLUCOSE mg/dL 118* 81 128*     Radiology Data:   Imaging Results (last 24 hours)     Procedure Component Value Units Date/Time    IR Angiogram Mesenteric Selective [[id number]] Collected:  04/16/19 0704     Updated:  04/16/19 0704    Narrative:       Performed by Dr. Orosco. See procedure note.  This report was finalized on  by Dr. Joel Orosco MD.    FL C Arm During Surgery [[id number]] Collected:  04/16/19 0704     Updated:  04/16/19 0704    Narrative:       Performed by Dr. Orosco. See procedure note.  This report was finalized on  by Dr. Joel Orosco MD.    CT Angiogram Chest With & Without Contrast [[id number]] Collected:  04/15/19 2157     Updated:  04/15/19 2205    Narrative:       Exam: CT angiogram of the of the chest with intravenous contrast.     CLINICAL HISTORY:    Pneumomediastinum on chest x-ray. Blood in endotracheal tube     DOSE:  549 mGycm. Automated exposure control was utilized to  diminish  patient radiation dose.     TECHNIQUE: Contiguous axial images were obtained through the thorax  following intravenous contrast administration with reformatted images  obtained in the sagittal and coronal projections from the original data  set. Three-dimensional reconstructions are also obtained.     COMPARISON:  04/13/2019     FINDINGS:     Pulmonary arteries:  There is normal enhancement of the pulmonary  arteries with no evidence of pulmonary thromboembolic disease.     Aorta:  The thoracic aorta and proximal great vessels are normal in  appearance. There is no evidence of aortic dissection or aneurysm.     LUNGS:  Bilateral lower lobe atelectasis is present. There are small  effusions     Pleural spaces: Small bilateral pleural effusions are present.     HEART: There is moderate cardiomegaly. No evidence of pericardial  effusion.     Bones: No acute osseous abnormalities are demonstrated.     CHEST WALL: There is developing anasarca with stranding in the  subcutaneous tissues. The previously described asymmetry in the right  breast is more symmetric on today's exam and may simply represent dense  fibroglandular tissue within the breasts.     Lymph nodes: No enlarged mediastinal or axillary lymphadenopathy is  present.     Upper abdomen: The liver is heterogeneous in appearance. A gallstone is  demonstrated. The liver is diminished in caliber and cirrhotic in  appearance. The spleen is enlarged.       Impression:       1. No evidence of pulmonary embolus. The thoracic aorta is normal in  caliber with no dissection or aneurysm. No evidence of mediastinal  hematoma.  2. Developing anasarca with stranding in the subcutaneous tissues. Small  effusions are present with bilateral lower lobe atelectasis.  This report was finalized on 04/15/2019 22:02 by Dr. Ace Moncada MD.    XR Chest 1 View [[id number]] Collected:  04/15/19 1656     Updated:  04/15/19 1703    Narrative:       EXAMINATION: XR CHEST 1 VW-  4/15/2019 4:56 PM CDT     HISTORY: Blood in endotracheal tube after extubation     COMPARISON: CTA chest 04/13/2019           FINDINGS:  There is marked interval change in the appearance of the chest when  compared to the previous exam. There is superior mediastinal widening  with perihilar opacities noted. The heart is magnified but felt to be  normal in size. There is no appreciable pneumothorax. The pulmonary  vasculature is difficult to evaluate. The lungs otherwise appear clear.       Impression:       New widening of the superior mediastinum with perihilar  opacities, concerning for possible aortic injury or dissection. CTA  chest is recommended for further evaluation.     Findings were discussed with the patient's nurse, April, at 4:57 PM on  04/15/2019.  This report was finalized on 04/15/2019 17:00 by Dr. Chris Casas MD.    US Venous Doppler Upper Extremity Left (duplex) [[id number]] Collected:  04/15/19 1502     Updated:  04/15/19 1505    Narrative:       History: Left upper extremity swelling       Impression:       Impression: There is no evidence of deep venous thrombosis or  superficial thrombophlebitis of the left upper extremity.     Comments: Left upper extremity venous duplex exam was performed using  color Doppler flow, Doppler wave form analysis, and grayscale imaging,  with and without compression. There is no evidence of deep venous  thrombosis of the internal jugular, subclavian, axillary, and brachial  veins. There is no evidence of superficial thrombophlebitis involving  the cephalic or basilic veins.      This report was finalized on 04/15/2019 15:02 by Dr. Saurabh Fried MD.          I have reviewed the patient's current medications.     Assessment/Plan     Active Hospital Problems    Diagnosis   • **Left upper quadrant pain   • Pancytopenia (CMS/HCC)     Secondary to hypersplenism/cirrhosis     • Splenic artery aneurysm (CMS/HCC)     Status post stent placement 4/15     • Splenomegaly    • Elevated troponin   • Liver cirrhosis (CMS/HCC)   • Diabetes mellitus (CMS/HCC)     Insulin dependent.      • Disease of thyroid gland   • Normocytic anemia   • Chest pain     Plan:  1. Has been placed on plavix.   2. Will add aldactone  3. Continue lactulose tid for now.   4. Her ammonia is improving, however, she is not having appropriate bowel movements with lactulose. She has no asterixis.   5. Physical therapy consult. Pt states she has been in 4 rehabs and has had home health. Doesn't really want to go back to rehab. States she has been like this for some time. States her  helps her at home.   6. Will discontinue lidoderm patch as her pain has resolved.   7. Her A1C is 4.6%. Will discontinue Levemir for now.     Discharge Planning: I expect the patient to be discharged to home in ? days.    DEISI Joseph   04/16/19   8:30 AM       I personally evaluated and examined the patient in conjunction with DEISI Seay and agree with the assessment, treatment plan, and disposition of the patient as recorded by her. My history, exam, and further recommendations are: I have reviewed and agree with the plans. [place].

## 2019-04-17 VITALS
RESPIRATION RATE: 16 BRPM | DIASTOLIC BLOOD PRESSURE: 80 MMHG | HEART RATE: 71 BPM | SYSTOLIC BLOOD PRESSURE: 162 MMHG | BODY MASS INDEX: 30.93 KG/M2 | HEIGHT: 66 IN | TEMPERATURE: 98.7 F | WEIGHT: 192.44 LBS | OXYGEN SATURATION: 98 %

## 2019-04-17 LAB
ANION GAP SERPL CALCULATED.3IONS-SCNC: 6 MMOL/L (ref 4–13)
BASOPHILS # BLD AUTO: 0.03 10*3/MM3 (ref 0–0.2)
BASOPHILS NFR BLD AUTO: 0.6 % (ref 0–2)
BUN BLD-MCNC: 13 MG/DL (ref 5–21)
BUN/CREAT SERPL: 13.5 (ref 7–25)
CALCIUM SPEC-SCNC: 8.2 MG/DL (ref 8.4–10.4)
CHLORIDE SERPL-SCNC: 105 MMOL/L (ref 98–110)
CO2 SERPL-SCNC: 26 MMOL/L (ref 24–31)
CREAT BLD-MCNC: 0.96 MG/DL (ref 0.5–1.4)
DEPRECATED RDW RBC AUTO: 50.4 FL (ref 40–54)
EOSINOPHIL # BLD AUTO: 0.19 10*3/MM3 (ref 0–0.7)
EOSINOPHIL NFR BLD AUTO: 4.1 % (ref 0–4)
ERYTHROCYTE [DISTWIDTH] IN BLOOD BY AUTOMATED COUNT: 15.8 % (ref 12–15)
GFR SERPL CREATININE-BSD FRML MDRD: 70 ML/MIN/1.73
GLUCOSE BLD-MCNC: 131 MG/DL (ref 70–100)
GLUCOSE BLDC GLUCOMTR-MCNC: 132 MG/DL (ref 70–130)
GLUCOSE BLDC GLUCOMTR-MCNC: 153 MG/DL (ref 70–130)
HCT VFR BLD AUTO: 29.2 % (ref 37–47)
HGB BLD-MCNC: 9.5 G/DL (ref 12–16)
IMM GRANULOCYTES # BLD AUTO: 0.03 10*3/MM3 (ref 0–0.05)
IMM GRANULOCYTES NFR BLD AUTO: 0.6 % (ref 0–5)
LYMPHOCYTES # BLD AUTO: 0.97 10*3/MM3 (ref 0.72–4.86)
LYMPHOCYTES NFR BLD AUTO: 21 % (ref 15–45)
MCH RBC QN AUTO: 28.7 PG (ref 28–32)
MCHC RBC AUTO-ENTMCNC: 32.5 G/DL (ref 33–36)
MCV RBC AUTO: 88.2 FL (ref 82–98)
MONOCYTES # BLD AUTO: 0.58 10*3/MM3 (ref 0.19–1.3)
MONOCYTES NFR BLD AUTO: 12.5 % (ref 4–12)
NEUTROPHILS # BLD AUTO: 2.83 10*3/MM3 (ref 1.87–8.4)
NEUTROPHILS NFR BLD AUTO: 61.2 % (ref 39–78)
NRBC BLD AUTO-RTO: 0 /100 WBC (ref 0–0)
PLATELET # BLD AUTO: 67 10*3/MM3 (ref 130–400)
PMV BLD AUTO: 12.4 FL (ref 6–12)
POTASSIUM BLD-SCNC: 3.4 MMOL/L (ref 3.5–5.3)
RBC # BLD AUTO: 3.31 10*6/MM3 (ref 4.2–5.4)
SODIUM BLD-SCNC: 137 MMOL/L (ref 135–145)
WBC NRBC COR # BLD: 4.63 10*3/MM3 (ref 4.8–10.8)

## 2019-04-17 PROCEDURE — 85025 COMPLETE CBC W/AUTO DIFF WBC: CPT | Performed by: NURSE PRACTITIONER

## 2019-04-17 PROCEDURE — 99231 SBSQ HOSP IP/OBS SF/LOW 25: CPT | Performed by: NURSE PRACTITIONER

## 2019-04-17 PROCEDURE — 82962 GLUCOSE BLOOD TEST: CPT

## 2019-04-17 PROCEDURE — 97116 GAIT TRAINING THERAPY: CPT

## 2019-04-17 PROCEDURE — 80048 BASIC METABOLIC PNL TOTAL CA: CPT | Performed by: NURSE PRACTITIONER

## 2019-04-17 PROCEDURE — 94760 N-INVAS EAR/PLS OXIMETRY 1: CPT

## 2019-04-17 PROCEDURE — 97110 THERAPEUTIC EXERCISES: CPT

## 2019-04-17 PROCEDURE — 94799 UNLISTED PULMONARY SVC/PX: CPT

## 2019-04-17 RX ORDER — POTASSIUM CHLORIDE 750 MG/1
40 CAPSULE, EXTENDED RELEASE ORAL ONCE
Status: COMPLETED | OUTPATIENT
Start: 2019-04-17 | End: 2019-04-17

## 2019-04-17 RX ORDER — ASPIRIN 81 MG/1
81 TABLET ORAL DAILY
Qty: 30 TABLET | Refills: 0 | Status: SHIPPED | OUTPATIENT
Start: 2019-04-17

## 2019-04-17 RX ORDER — LEVOTHYROXINE SODIUM 88 UG/1
88 TABLET ORAL DAILY
Qty: 30 TABLET | Refills: 0 | Status: SHIPPED | OUTPATIENT
Start: 2019-04-17

## 2019-04-17 RX ORDER — CARVEDILOL 3.12 MG/1
3.12 TABLET ORAL EVERY 12 HOURS SCHEDULED
Qty: 60 TABLET | Refills: 0 | Status: SHIPPED | OUTPATIENT
Start: 2019-04-17

## 2019-04-17 RX ORDER — SPIRONOLACTONE 25 MG/1
25 TABLET ORAL DAILY
Qty: 30 TABLET | Refills: 0 | Status: SHIPPED | OUTPATIENT
Start: 2019-04-18

## 2019-04-17 RX ORDER — HYDROCODONE BITARTRATE AND ACETAMINOPHEN 5; 325 MG/1; MG/1
1 TABLET ORAL EVERY 8 HOURS PRN
Qty: 9 TABLET | Refills: 0 | Status: SHIPPED | OUTPATIENT
Start: 2019-04-17 | End: 2019-04-25

## 2019-04-17 RX ORDER — LACTULOSE 10 G/15ML
20 SOLUTION ORAL; RECTAL 3 TIMES DAILY
Qty: 946 ML | Refills: 0 | Status: SHIPPED | OUTPATIENT
Start: 2019-04-17

## 2019-04-17 RX ORDER — CLOPIDOGREL BISULFATE 75 MG/1
75 TABLET ORAL DAILY
Qty: 30 TABLET | Refills: 0 | Status: SHIPPED | OUTPATIENT
Start: 2019-04-17

## 2019-04-17 RX ADMIN — ASPIRIN 325 MG: 325 TABLET, COATED ORAL at 09:42

## 2019-04-17 RX ADMIN — FUROSEMIDE 40 MG: 40 TABLET ORAL at 09:43

## 2019-04-17 RX ADMIN — HYDROCODONE BITARTRATE AND ACETAMINOPHEN 1 TABLET: 5; 325 TABLET ORAL at 11:48

## 2019-04-17 RX ADMIN — POTASSIUM CHLORIDE 40 MEQ: 750 CAPSULE, EXTENDED RELEASE ORAL at 11:48

## 2019-04-17 RX ADMIN — CARVEDILOL 3.12 MG: 3.12 TABLET, FILM COATED ORAL at 09:43

## 2019-04-17 RX ADMIN — NYSTATIN: 100000 POWDER TOPICAL at 09:44

## 2019-04-17 RX ADMIN — LACTULOSE 20 G: 20 SOLUTION ORAL at 09:42

## 2019-04-17 RX ADMIN — LEVOTHYROXINE SODIUM 88 MCG: 88 TABLET ORAL at 06:01

## 2019-04-17 RX ADMIN — SODIUM CHLORIDE, PRESERVATIVE FREE 3 ML: 5 INJECTION INTRAVENOUS at 09:44

## 2019-04-17 RX ADMIN — SPIRONOLACTONE 25 MG: 25 TABLET ORAL at 09:43

## 2019-04-17 NOTE — PROGRESS NOTES
LOS: 1 day   Patient Care Team:  See Fritz DO as PCP - General  See Fritz DO as PCP - Family Medicine    Chief Complaint: Status post splenic artery aneurysm repair    Subjective     Patient seen/examined with no complaints of abdominal pain since surgery.  AVSS, blood pressure slightly elevated, afebrile.    Objective     Vital Signs  Temp:  [98.1 °F (36.7 °C)-98.7 °F (37.1 °C)] 98.7 °F (37.1 °C)  Heart Rate:  [65-78] 71  Resp:  [16-18] 16  BP: (134-171)/(59-92) 171/77    Physical Exam   Constitutional: She is oriented to person, place, and time. Vital signs are normal. She appears well-developed and well-nourished. No distress.   Chronically ill-appearing   HENT:   Head: Normocephalic and atraumatic.   Mouth/Throat: Oropharynx is clear and moist.   Eyes: Pupils are equal, round, and reactive to light. No scleral icterus.   Neck: Normal range of motion. Neck supple. No JVD present. Carotid bruit is not present. No thyromegaly present.   Cardiovascular: Normal rate, regular rhythm, S2 normal, normal heart sounds, intact distal pulses and normal pulses. Exam reveals no gallop and no friction rub.   No murmur heard.  Pulmonary/Chest: Effort normal and breath sounds normal.   Abdominal: Soft. Normal aorta and bowel sounds are normal. There is no hepatosplenomegaly. There is no tenderness.   Musculoskeletal: Normal range of motion. She exhibits edema (To bilateral lower extremities).   Neurological: She is alert and oriented to person, place, and time. No cranial nerve deficit.   Skin: Skin is warm and dry. She is not diaphoretic.   Psychiatric: She has a normal mood and affect. Her behavior is normal. Judgment and thought content normal.   Nursing note and vitals reviewed.      Laboratory Data:   Results from last 7 days   Lab Units 04/17/19  0555 04/16/19  0600 04/15/19  0814   WBC 10*3/mm3 4.63* 7.07 3.19*   HEMOGLOBIN g/dL 9.5* 9.4* 8.9*   HEMATOCRIT % 29.2* 29.0* 27.0*   PLATELETS  10*3/mm3 67* 82* 74*       Results from last 7 days   Lab Units 04/17/19  0555 04/16/19  0600 04/15/19  0814 04/14/19  0316   SODIUM mmol/L 137 137 136 139   POTASSIUM mmol/L 3.4* 3.6 3.8 3.9   CHLORIDE mmol/L 105 107 108 109   CO2 mmol/L 26.0 25.0 25.0 26.0   BUN mg/dL 13 15 15 13   CREATININE mg/dL 0.96 0.94 0.96 0.82   CALCIUM mg/dL 8.2* 8.1* 7.8* 8.1*   BILIRUBIN mg/dL  --  2.1* 2.4* 2.1*   ALK PHOS U/L  --  197* 191* 249*   ALT (SGPT) U/L  --  30 27 27   AST (SGOT) U/L  --  55* 42 47*   GLUCOSE mg/dL 131* 118* 81 128*     Results from last 7 days   Lab Units 04/13/19  1724   INR  1.51*   APTT seconds 58.4*            Medication Review: Reviewed    Assessment/Plan       Left upper quadrant pain    Chest pain    Splenic artery aneurysm (CMS/HCC)    Splenomegaly    Elevated troponin    Liver cirrhosis (CMS/HCC)    Diabetes mellitus (CMS/HCC)    Disease of thyroid gland    Normocytic anemia    Pancytopenia (CMS/HCC)      Plan:  He is doing well and from a surgical standpoint would be okay to be discharged when okay with admitting physician.  He reports his abdominal pain has completely resolved.  He will need to follow-up in 2 weeks with our office.    DEISI Alba  Vascular Surgery  [phone]  04/17/19  12:41 PM

## 2019-04-17 NOTE — PROGRESS NOTES
Continued Stay Note   [place]     Patient Name: Daniel Oliver  MRN: [id number]  Today's Date: 4/17/2019    Admit Date: 4/13/2019    Discharge Plan     Row Name 04/17/19 1456       Plan    Patient/Family in Agreement with Plan  yes    Final Discharge Disposition Code  06 - home with home health care    Final Note  SW spoke to Lizette at [facility] and informed of referral.  Pt fired PCP and Dr. Perry agreed to follow HH until they find PCP.  Pt will dc today.  JAME Avilez.     Row Name 04/17/19 1429       Plan    Patient/Family in Agreement with Plan  yes        Discharge Codes    No documentation.       Expected Discharge Date and Time     Expected Discharge Date Expected Discharge Time    Apr 17, 2019             JAME Gates

## 2019-04-17 NOTE — DISCHARGE SUMMARY
[facility]  DISCHARGE SUMMARY       Date of Admission: 4/13/2019  Date of Discharge:  4/17/2019  Primary Care Physician: See Fritz DO    Presenting Problem/History of Present Illness:  Chest pain    Final Discharge Diagnoses:  • **Left upper quadrant pain   • Pancytopenia (CMS/HCC)       Secondary to hypersplenism/cirrhosis      • Splenic artery aneurysm (CMS/HCC)       Status post stent placement 4/15      • Splenomegaly   • Elevated troponin   • Liver cirrhosis (CMS/HCC)   • Diabetes mellitus (CMS/HCC)       Insulin dependent.       • Disease of thyroid gland   • Normocytic anemia   • Chest pain      Consults:   1. Dr. Darius Caceres-cardiology  2. Dr. Joel Orosco-vascular surgery    Procedures Performed: on 4/15  1.  Introduction of catheter/sheath into the aorta  2.  Aortoiliac angiogram with radiographic supervision and interpretation  3.  Selective cannulation of the celiac trunk with a 6.5 Yoruba Aptus  steerable sheath  4.  Selective cannulation of the splenic artery  5.  Placement of 6 mm x 5 cm Viabahn stent graft across the saccular splenic artery aneurysm  6.  Postdilatation with a 5 x 40 mm Lorman balloon  7.  Minx closure    Pertinent Test Results:   Imaging Results (last 7 days)     Procedure Component Value Units Date/Time    IR Angiogram Mesenteric Selective [[id number]] Collected:  04/16/19 0704     Updated:  04/16/19 0704    Narrative:       Performed by Dr. Orosco. See procedure note.  This report was finalized on  by Dr. Joel Orosco MD.    FL C Arm During Surgery [[id number]] Collected:  04/16/19 0704     Updated:  04/16/19 0704    Narrative:       Performed by Dr. Orosco. See procedure note.  This report was finalized on  by Dr. Joel Orosco MD.    CT Angiogram Chest With & Without Contrast [[id number]] Collected:  04/15/19 2157     Updated:  04/15/19 2205    Narrative:       Exam: CT angiogram of the of the  chest with intravenous contrast.     CLINICAL HISTORY:    Pneumomediastinum on chest x-ray. Blood in endotracheal tube     DOSE:  549 mGycm. Automated exposure control was utilized to diminish  patient radiation dose.     TECHNIQUE: Contiguous axial images were obtained through the thorax  following intravenous contrast administration with reformatted images  obtained in the sagittal and coronal projections from the original data  set. Three-dimensional reconstructions are also obtained.     COMPARISON:  04/13/2019     FINDINGS:     Pulmonary arteries:  There is normal enhancement of the pulmonary  arteries with no evidence of pulmonary thromboembolic disease.     Aorta:  The thoracic aorta and proximal great vessels are normal in  appearance. There is no evidence of aortic dissection or aneurysm.     LUNGS:  Bilateral lower lobe atelectasis is present. There are small  effusions     Pleural spaces: Small bilateral pleural effusions are present.     HEART: There is moderate cardiomegaly. No evidence of pericardial  effusion.     Bones: No acute osseous abnormalities are demonstrated.     CHEST WALL: There is developing anasarca with stranding in the  subcutaneous tissues. The previously described asymmetry in the right  breast is more symmetric on today's exam and may simply represent dense  fibroglandular tissue within the breasts.     Lymph nodes: No enlarged mediastinal or axillary lymphadenopathy is  present.     Upper abdomen: The liver is heterogeneous in appearance. A gallstone is  demonstrated. The liver is diminished in caliber and cirrhotic in  appearance. The spleen is enlarged.       Impression:       1. No evidence of pulmonary embolus. The thoracic aorta is normal in  caliber with no dissection or aneurysm. No evidence of mediastinal  hematoma.  2. Developing anasarca with stranding in the subcutaneous tissues. Small  effusions are present with bilateral lower lobe atelectasis.  This report was  finalized on 04/15/2019 22:02 by Dr. Ace Moncada MD.    XR Chest 1 View [[id number]] Collected:  04/15/19 1656     Updated:  04/15/19 1703    Narrative:       EXAMINATION: XR CHEST 1 VW- 4/15/2019 4:56 PM CDT     HISTORY: Blood in endotracheal tube after extubation     COMPARISON: CTA chest 04/13/2019           FINDINGS:  There is marked interval change in the appearance of the chest when  compared to the previous exam. There is superior mediastinal widening  with perihilar opacities noted. The heart is magnified but felt to be  normal in size. There is no appreciable pneumothorax. The pulmonary  vasculature is difficult to evaluate. The lungs otherwise appear clear.       Impression:       New widening of the superior mediastinum with perihilar  opacities, concerning for possible aortic injury or dissection. CTA  chest is recommended for further evaluation.     Findings were discussed with the patient's nurse, April, at 4:57 PM on  04/15/2019.  This report was finalized on 04/15/2019 17:00 by Dr. Chris Casas MD.    US Venous Doppler Upper Extremity Left (duplex) [[id number]] Collected:  04/15/19 1502     Updated:  04/15/19 1505    Narrative:       History: Left upper extremity swelling       Impression:       Impression: There is no evidence of deep venous thrombosis or  superficial thrombophlebitis of the left upper extremity.     Comments: Left upper extremity venous duplex exam was performed using  color Doppler flow, Doppler wave form analysis, and grayscale imaging,  with and without compression. There is no evidence of deep venous  thrombosis of the internal jugular, subclavian, axillary, and brachial  veins. There is no evidence of superficial thrombophlebitis involving  the cephalic or basilic veins.      This report was finalized on 04/15/2019 15:02 by Dr. Saurabh Fried MD.    US Abdomen Complete [[id number]] Collected:  04/14/19 1329     Updated:  04/14/19 1339    Narrative:        History:  68-year-old with splenic aneurysm.      Reference:  CTA chest 1 day prior.     Findings:  Real time sonography of the abdomen was performed.     Cirrhotic liver. There is a patent splenic artery aneurysm correlated  with yesterday's CT. This measures 2.1 cm. Cholelithiasis without wall  thickening or adjacent edema. No biliary dilatation. Common bile duct  diameter 7 mm.     Spleen is mildly enlarged measuring up to 14.5 cm.     Pancreas is not well assessed by ultrasound.     Left nephrolithiasis is suspected. Otherwise both kidneys are normal in  size, contour, and cortical thickness/echogenicity. Right renal length  11.5 cm. Left renal length 11.4 cm.     Urinary bladder decompressed by Mcdonald catheter.     Atherosclerotic abdominal aorta without aneurysm. Visualized IVC is  unremarkable.          Impression:          1. Patent 2.1 cm proximal splenic artery aneurysm.  2. Cholelithiasis without evidence of cholecystitis.  3. Cirrhosis with splenomegaly.  This report was finalized on 04/14/2019 13:35 by Dr Julius Page, .    CT Angiogram Chest With Contrast [[id number]] Collected:  04/13/19 1859     Updated:  04/13/19 1910    Narrative:       Exam: CT angiogram of the of the chest with intravenous contrast.     CLINICAL HISTORY:  Chest pain.     DOSE:  387 mGycm. Automated exposure control was utilized to diminish  patient radiation dose.     TECHNIQUE: Contiguous axial images were obtained through the thorax  following intravenous contrast administration with reformatted images  obtained in the sagittal and coronal projections from the original data  set. Three-dimensional reconstructions are also obtained.     COMPARISON:  None available     FINDINGS:     Pulmonary arteries:  There is normal enhancement of the pulmonary  arteries with no evidence of pulmonary thromboembolic disease.     Aorta:  The thoracic aorta and proximal great vessels are normal in  appearance. There is no evidence of aortic dissection  or aneurysm.     LUNGS:  Bibasilar atelectasis is present. The lungs are otherwise clear  without evidence of lobar pneumonia or effusion.     Pleural spaces: Unremarkable. No evidence of pleural effusion or  pneumothorax.     HEART: There is moderate cardiomegaly. No evidence of right ventricular  dysfunction. Coronary artery calcifications are noted in the LAD  distribution. No evidence of pericardial effusion.     Bones: No acute osseous abnormalities are demonstrated.     CHEST WALL: There is a masslike density associated with the right breast  and correlation is recommended with the physical exam. This may simply  represent some asymmetric breast tissue that is certainly asymmetric  with the contralateral breast. There is an enlarged right axillary node  measuring 13 mm in short axis.     Lymph nodes: No enlarged mediastinal or left axillary adenopathy is  present.     Upper abdomen: The liver is cirrhotic in appearance. Cholelithiasis is  present. The spleen is enlarged suggesting portal hypertension. There  are suspected varicosities within the upper abdomen. 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 This appears to emanate from the proximal left splenic artery.       Impression:       1. No evidence of pulmonary thromboembolic disease.  2. Bibasilar atelectasis. No evidence of lobar pneumonia or effusion.  3. There is a suspected right breast mass with asymmetric soft tissue  density within the breast. There is no enlarged right axillary node.  Correlation is recommended with the patient's physical exam. If not  recently undertaken follow-up with outpatient mammography would be  suggested.  4. Cirrhotic changes of the liver with splenomegaly and suspected upper  abdominal varices. FINDINGS suggest portal hypertension. There is a 2.1  cm aneurysm which appears to emanate from the proximal splenic artery  within the anterior upper abdomen.  This report  was finalized on 04/13/2019 19:07 by Dr. Ace Moncada MD.        Lab Results (last 7 days)     Procedure Component Value Units Date/Time    POC Glucose Once [[id number]]  (Abnormal) Collected:  04/17/19 1200    Specimen:  Blood Updated:  04/17/19 1211     Glucose 132 mg/dL      Comment: : [id number] ReviewZAP AllieMeter ID: [id number]       POC Glucose Once [[id number]]  (Abnormal) Collected:  04/17/19 0722    Specimen:  Blood Updated:  04/17/19 0733     Glucose 153 mg/dL      Comment: : [id number] ReviewZAP AllieMeter ID: [id number]       Basic Metabolic Panel [[id number]]  (Abnormal) Collected:  04/17/19 0555    Specimen:  Blood Updated:  04/17/19 0657     Glucose 131 mg/dL      BUN 13 mg/dL      Creatinine 0.96 mg/dL      Sodium 137 mmol/L      Potassium 3.4 mmol/L      Chloride 105 mmol/L      CO2 26.0 mmol/L      Calcium 8.2 mg/dL      eGFR  African Amer 70 mL/min/1.73      BUN/Creatinine Ratio 13.5     Anion Gap 6.0 mmol/L     Narrative:       GFR Normal >60  Chronic Kidney Disease <60  Kidney Failure <15    CBC & Differential [[id number]] Collected:  04/17/19 0555    Specimen:  Blood Updated:  04/17/19 0646    Narrative:       The following orders were created for panel order CBC & Differential.  Procedure                               Abnormality         Status                     ---------                               -----------         ------                     CBC Auto Differential[[id number]]        Abnormal            Final result                 Please view results for these tests on the individual orders.    CBC Auto Differential [[id number]]  (Abnormal) Collected:  04/17/19 0555    Specimen:  Blood Updated:  04/17/19 0646     WBC 4.63 10*3/mm3      RBC 3.31 10*6/mm3      Hemoglobin 9.5 g/dL      Hematocrit 29.2 %      MCV 88.2 fL      MCH 28.7 pg      MCHC 32.5 g/dL      RDW 15.8 %      RDW-SD 50.4 fl      MPV 12.4 fL      Platelets 67 10*3/mm3      Neutrophil % 61.2 %      Lymphocyte %  21.0 %      Monocyte % 12.5 %      Eosinophil % 4.1 %      Basophil % 0.6 %      Immature Grans % 0.6 %      Neutrophils, Absolute 2.83 10*3/mm3      Lymphocytes, Absolute 0.97 10*3/mm3      Monocytes, Absolute 0.58 10*3/mm3      Eosinophils, Absolute 0.19 10*3/mm3      Basophils, Absolute 0.03 10*3/mm3      Immature Grans, Absolute 0.03 10*3/mm3      nRBC 0.0 /100 WBC     POC Glucose Once [[id number]]  (Abnormal) Collected:  04/16/19 1941    Specimen:  Blood Updated:  04/16/19 1953     Glucose 186 mg/dL      Comment: : [id number] Jasiel LindaMeter ID: [id number]       POC Glucose Once [[id number]]  (Abnormal) Collected:  04/16/19 1537    Specimen:  Blood Updated:  04/16/19 1634     Glucose 166 mg/dL      Comment: : [id number] Duane LevindaMeter ID: [id number]       POC Glucose Once [[id number]]  (Abnormal) Collected:  04/16/19 1220    Specimen:  Blood Updated:  04/16/19 1301     Glucose 139 mg/dL      Comment: : [id number] Duane LevindaMeter ID: [id number]       POC Glucose Once [[id number]]  (Normal) Collected:  04/16/19 0734    Specimen:  Blood Updated:  04/16/19 0804     Glucose 124 mg/dL      Comment: : [id number] Duane LevindaMeter ID: [id number]       Ammonia [[id number]]  (Abnormal) Collected:  04/16/19 0600    Specimen:  Blood Updated:  04/16/19 0629     Ammonia 43 umol/L     Comprehensive Metabolic Panel [[id number]]  (Abnormal) Collected:  04/16/19 0600    Specimen:  Blood Updated:  04/16/19 0622     Glucose 118 mg/dL      BUN 15 mg/dL      Creatinine 0.94 mg/dL      Sodium 137 mmol/L      Potassium 3.6 mmol/L      Chloride 107 mmol/L      CO2 25.0 mmol/L      Calcium 8.1 mg/dL      Total Protein 5.0 g/dL      Albumin 2.00 g/dL      ALT (SGPT) 30 U/L      AST (SGOT) 55 U/L      Alkaline Phosphatase 197 U/L      Total Bilirubin 2.1 mg/dL      eGFR  African Amer 72 mL/min/1.73      Globulin 3.0 gm/dL      A/G Ratio 0.7 g/dL      BUN/Creatinine Ratio 16.0     Anion Gap 5.0 mmol/L     Narrative:        GFR Normal >60  Chronic Kidney Disease <60  Kidney Failure <15    CBC (No Diff) [[id number]]  (Abnormal) Collected:  04/16/19 0600    Specimen:  Blood Updated:  04/16/19 0613     WBC 7.07 10*3/mm3      RBC 3.29 10*6/mm3      Hemoglobin 9.4 g/dL      Hematocrit 29.0 %      MCV 88.1 fL      MCH 28.6 pg      MCHC 32.4 g/dL      RDW 15.8 %      RDW-SD 51.1 fl      MPV 11.5 fL      Platelets 82 10*3/mm3     POC Glucose Once [[id number]]  (Abnormal) Collected:  04/15/19 1947    Specimen:  Blood Updated:  04/15/19 1958     Glucose 143 mg/dL      Comment: : [id number] Jasiel WylieaMeter ID: [id number]       POC Glucose Once [[id number]]  (Normal) Collected:  04/15/19 1632    Specimen:  Blood Updated:  04/15/19 1702     Glucose 86 mg/dL      Comment: : [id number] Duane OrozcoaMeter ID: [id number]       POC Glucose Once [[id number]]  (Normal) Collected:  04/15/19 1526    Specimen:  Blood Updated:  04/15/19 1540     Glucose 79 mg/dL      Comment: : [id number] Marcel LemusMeter ID: [id number]       POC Glucose Once [[id number]]  (Normal) Collected:  04/15/19 1124    Specimen:  Blood Updated:  04/15/19 1158     Glucose 93 mg/dL      Comment: : [id number] Duane OrozcoaMeter ID: [id number]       Magnesium [[id number]]  (Normal) Collected:  04/15/19 0814    Specimen:  Blood Updated:  04/15/19 0848     Magnesium 1.8 mg/dL     Comprehensive Metabolic Panel [[id number]]  (Abnormal) Collected:  04/15/19 0814    Specimen:  Blood Updated:  04/15/19 0848     Glucose 81 mg/dL      BUN 15 mg/dL      Creatinine 0.96 mg/dL      Sodium 136 mmol/L      Potassium 3.8 mmol/L      Chloride 108 mmol/L      CO2 25.0 mmol/L      Calcium 7.8 mg/dL      Total Protein 4.6 g/dL      Albumin 1.80 g/dL      ALT (SGPT) 27 U/L      AST (SGOT) 42 U/L      Alkaline Phosphatase 191 U/L      Total Bilirubin 2.4 mg/dL      eGFR  African Amer 70 mL/min/1.73      Globulin 2.8 gm/dL      A/G Ratio 0.6 g/dL      BUN/Creatinine Ratio 15.6     Anion Gap 3.0  mmol/L     Narrative:       GFR Normal >60  Chronic Kidney Disease <60  Kidney Failure <15    Ammonia [[id number]]  (Abnormal) Collected:  04/15/19 0814    Specimen:  Blood Updated:  04/15/19 0844     Ammonia 81 umol/L     CBC (No Diff) [[id number]]  (Abnormal) Collected:  04/15/19 0814    Specimen:  Blood Updated:  04/15/19 0831     WBC 3.19 10*3/mm3      RBC 3.05 10*6/mm3      Hemoglobin 8.9 g/dL      Hematocrit 27.0 %      MCV 88.5 fL      MCH 29.2 pg      MCHC 33.0 g/dL      RDW 16.1 %      RDW-SD 51.8 fl      MPV 12.0 fL      Platelets 74 10*3/mm3     POC Glucose Once [[id number]]  (Normal) Collected:  04/15/19 0730    Specimen:  Blood Updated:  04/15/19 0800     Glucose 92 mg/dL      Comment: : [id number] Duane OrozcoaMeter ID: [id number]       POC Glucose Once [[id number]]  (Normal) Collected:  04/14/19 2133    Specimen:  Blood Updated:  04/14/19 2144     Glucose 100 mg/dL      Comment: : [id number] Tg SherryMeter ID: [id number]       POC Glucose Once [[id number]]  (Abnormal) Collected:  04/14/19 1623    Specimen:  Blood Updated:  04/14/19 1635     Glucose 135 mg/dL      Comment: : [id number] Lambert KaleeMeter ID: [id number]       POC Glucose Once [[id number]]  (Normal) Collected:  04/14/19 1309    Specimen:  Blood Updated:  04/14/19 1319     Glucose 107 mg/dL      Comment: : [id number] Lambert KaleeMeter ID: [id number]       Ferritin [[id number]]  (Normal) Collected:  04/14/19 0316    Specimen:  Blood Updated:  04/14/19 1223     Ferritin 118.00 ng/mL     Iron Profile [[id number]]  (Abnormal) Collected:  04/14/19 0316    Specimen:  Blood Updated:  04/14/19 1154     Iron 65 mcg/dL      TIBC 180 mcg/dL      Iron Saturation 36 %     POC Glucose Once [[id number]]  (Normal) Collected:  04/14/19 0834    Specimen:  Blood Updated:  04/14/19 0846     Glucose 105 mg/dL      Comment: : [id number] Lambert VirgeneeMeter ID: [id number]       Hemoglobin A1c [[id number]] Collected:  04/14/19 0024     Specimen:  Blood Updated:  04/14/19 0842     Hemoglobin A1C 4.6 %     Narrative:       Less than 6.0           Non-Diabetic Range  6.0-7.0                 ADA Therapeutic Target  Greater than 7.0        Action Suggested    Lipid Panel [[id number]]  (Abnormal) Collected:  04/14/19 0316    Specimen:  Blood Updated:  04/14/19 0358     Total Cholesterol 122 mg/dL      Triglycerides 50 mg/dL      HDL Cholesterol 41 mg/dL      LDL Cholesterol  38 mg/dL      LDL/HDL Ratio 1.73    Troponin [[id number]]  (Normal) Collected:  04/14/19 0316    Specimen:  Blood Updated:  04/14/19 0355     Troponin I 0.019 ng/mL     Magnesium [[id number]]  (Normal) Collected:  04/14/19 0316    Specimen:  Blood Updated:  04/14/19 0347     Magnesium 1.7 mg/dL     Comprehensive Metabolic Panel [[id number]]  (Abnormal) Collected:  04/14/19 0316    Specimen:  Blood Updated:  04/14/19 0347     Glucose 128 mg/dL      BUN 13 mg/dL      Creatinine 0.82 mg/dL      Sodium 139 mmol/L      Potassium 3.9 mmol/L      Chloride 109 mmol/L      CO2 26.0 mmol/L      Calcium 8.1 mg/dL      Total Protein 4.7 g/dL      Albumin 1.90 g/dL      ALT (SGPT) 27 U/L      AST (SGOT) 47 U/L      Alkaline Phosphatase 249 U/L      Total Bilirubin 2.1 mg/dL      eGFR  African Amer 84 mL/min/1.73      Globulin 2.8 gm/dL      A/G Ratio 0.7 g/dL      BUN/Creatinine Ratio 15.9     Anion Gap 4.0 mmol/L     Narrative:       GFR Normal >60  Chronic Kidney Disease <60  Kidney Failure <15    Ammonia [[id number]]  (Abnormal) Collected:  04/14/19 0316    Specimen:  Blood Updated:  04/14/19 0341     Ammonia 137 umol/L     T4, Free [[id number]]  (Normal) Collected:  04/14/19 0024    Specimen:  Blood Updated:  04/14/19 0108     Free T4 1.47 ng/dL     Troponin [[id number]]  (Normal) Collected:  04/14/19 0024    Specimen:  Blood Updated:  04/14/19 0105     Troponin I 0.019 ng/mL     CBC Auto Differential [[id number]]  (Abnormal) Collected:  04/14/19 0024    Specimen:  Blood Updated:  04/14/19 0052      WBC 3.18 10*3/mm3      RBC 3.24 10*6/mm3      Hemoglobin 9.5 g/dL      Hematocrit 28.4 %      MCV 87.7 fL      MCH 29.3 pg      MCHC 33.5 g/dL      RDW 15.8 %      RDW-SD 50.4 fl      MPV 12.0 fL      Platelets 72 10*3/mm3      Neutrophil % 60.1 %      Lymphocyte % 22.0 %      Monocyte % 12.9 %      Eosinophil % 4.1 %      Basophil % 0.6 %      Immature Grans % 0.3 %      Neutrophils, Absolute 1.91 10*3/mm3      Lymphocytes, Absolute 0.70 10*3/mm3      Monocytes, Absolute 0.41 10*3/mm3      Eosinophils, Absolute 0.13 10*3/mm3      Basophils, Absolute 0.02 10*3/mm3      Immature Grans, Absolute 0.01 10*3/mm3      nRBC 0.0 /100 WBC     BNP [[id number]]  (Normal) Collected:  04/13/19 2037    Specimen:  Blood Updated:  04/14/19 0038     proBNP 125.0 pg/mL     Hepatic Function Panel [[id number]]  (Abnormal) Collected:  04/13/19 2037    Specimen:  Blood Updated:  04/14/19 0029     Total Protein 5.7 g/dL      Albumin 2.30 g/dL      ALT (SGPT) 30 U/L      AST (SGOT) 60 U/L      Alkaline Phosphatase 312 U/L      Total Bilirubin 2.5 mg/dL      Bilirubin, Direct 0.0 mg/dL      Bilirubin, Indirect 1.8 mg/dL     Magnesium [[id number]]  (Normal) Collected:  04/13/19 2037    Specimen:  Blood Updated:  04/14/19 0029     Magnesium 1.7 mg/dL     Troponin [[id number]]  (Normal) Collected:  04/13/19 2037    Specimen:  Blood Updated:  04/13/19 2106     Troponin I 0.026 ng/mL     TSH [[id number]]  (Abnormal) Collected:  04/13/19 1724    Specimen:  Blood Updated:  04/13/19 1914     TSH 6.160 mIU/mL     T4, Free [[id number]]  (Normal) Collected:  04/13/19 1724    Specimen:  Blood Updated:  04/13/19 1900     Free T4 1.40 ng/dL     Urinalysis With Culture If Indicated - Urine, Catheter [[id number]]  (Abnormal) Collected:  04/13/19 1809    Specimen:  Urine, Catheter Updated:  04/13/19 1820     Color, UA Yellow     Appearance, UA Cloudy     pH, UA 6.5     Specific Gravity, UA 1.012     Glucose, UA Negative     Ketones, UA Negative     Bilirubin, UA  Negative     Blood, UA Moderate (2+)     Protein, UA Negative     Leuk Esterase, UA Trace     Nitrite, UA Negative     Urobilinogen, UA 2.0 E.U./dL    Urinalysis, Microscopic Only - Urine, Catheter [[id number]]  (Abnormal) Collected:  04/13/19 1809    Specimen:  Urine, Catheter Updated:  04/13/19 1820     RBC, UA 21-30 /HPF      WBC, UA 3-5 /HPF      Bacteria, UA None Seen /HPF      Squamous Epithelial Cells, UA 0-2 /HPF      Hyaline Casts, UA None Seen /LPF      Methodology Automated Microscopy    Troponin [[id number]]  (Normal) Collected:  04/13/19 1724    Specimen:  Blood Updated:  04/13/19 1756     Troponin I 0.023 ng/mL     BNP [[id number]]  (Normal) Collected:  04/13/19 1724    Specimen:  Blood Updated:  04/13/19 1751     proBNP 126.0 pg/mL     Myoglobin, Serum [[id number]]  (Normal) Collected:  04/13/19 1724    Specimen:  Blood Updated:  04/13/19 1751     Myoglobin 97.8 ng/mL     Protime-INR [[id number]]  (Abnormal) Collected:  04/13/19 1724    Specimen:  Blood Updated:  04/13/19 1742     Protime 18.6 Seconds      INR 1.51    aPTT [[id number]]  (Abnormal) Collected:  04/13/19 1724    Specimen:  Blood Updated:  04/13/19 1742     PTT 58.4 seconds     D-dimer, Quantitative [[id number]]  (Abnormal) Collected:  04/13/19 1724    Specimen:  Blood Updated:  04/13/19 1742     D-Dimer, Quantitative 2.31 mg/L (FEU)     Narrative:       Reference Range is 0-0.50 mg/L FEU. However, results <0.50 mg/L FEU tends to rule out DVT or PE. Results >0.50 mg/L FEU are not useful in predicting absence or presence of DVT or PE.    CK [[id number]]  (Abnormal) Collected:  04/13/19 1724    Specimen:  Blood Updated:  04/13/19 1741     Creatine Kinase 286 U/L     Basic Metabolic Panel [[id number]]  (Abnormal) Collected:  04/13/19 1724    Specimen:  Blood Updated:  04/13/19 1741     Glucose 98 mg/dL      BUN 11 mg/dL      Creatinine 0.84 mg/dL      Sodium 139 mmol/L      Potassium 3.8 mmol/L      Chloride 108 mmol/L      CO2 29.0 mmol/L       Calcium 8.5 mg/dL      eGFR  Oneil Amer 82 mL/min/1.73      BUN/Creatinine Ratio 13.1     Anion Gap 2.0 mmol/L     Narrative:       GFR Normal >60  Chronic Kidney Disease <60  Kidney Failure <15    CBC & Differential [[id number]] Collected:  04/13/19 1724    Specimen:  Blood Updated:  04/13/19 1735    Narrative:       The following orders were created for panel order CBC & Differential.  Procedure                               Abnormality         Status                     ---------                               -----------         ------                     CBC Auto Differential[[id number]]        Abnormal            Final result                 Please view results for these tests on the individual orders.    CBC Auto Differential [[id number]]  (Abnormal) Collected:  04/13/19 1724    Specimen:  Blood Updated:  04/13/19 1735     WBC 3.48 10*3/mm3      RBC 3.73 10*6/mm3      Hemoglobin 10.8 g/dL      Hematocrit 32.9 %      MCV 88.2 fL      MCH 29.0 pg      MCHC 32.8 g/dL      RDW 15.8 %      RDW-SD 50.7 fl      MPV 11.6 fL      Platelets 78 10*3/mm3      Neutrophil % 55.4 %      Lymphocyte % 25.0 %      Monocyte % 13.8 %      Eosinophil % 4.9 %      Basophil % 0.6 %      Immature Grans % 0.3 %      Neutrophils, Absolute 1.93 10*3/mm3      Lymphocytes, Absolute 0.87 10*3/mm3      Monocytes, Absolute 0.48 10*3/mm3      Eosinophils, Absolute 0.17 10*3/mm3      Basophils, Absolute 0.02 10*3/mm3      Immature Grans, Absolute 0.01 10*3/mm3      nRBC 0.0 /100 WBC         Hospital Course:  The patient is a 68 y.o. female who follows with no PCP at present. Her son fired her most recent PCP. She has a past medical history significant for cirrhosis of unknown etiology, hypertension, diabetes mellitus, and hypothyroidism. She presented as a transfer on 4/13 with complaints of left upper quadrant pain. She was noted to have a troponin 0 0.134 and was transferred here.  Troponin in our facility was 0.023. She did have a CT scan of  the chest showing no pulmonary embolism but a 2.1 cm point proximal splenic artery aneurysm and a questionable right breast mass.  She was admitted to the hospitalist service for further evaluation.    She was seen in consultation by Dr. Darius Mcknight with cardiology.  Troponins were trended and were negative x3.  2D echocardiogram revealed ejection fraction of 66-70% with mild concentric hypertrophy.  I did not have any additional workup needed.  She is not having true chest pain she was having more chest wall pain and upper quadrant pain.    In addition, she was also seen by Dr. Joel Orosco with vascular surgery guarding her splenic artery aneurysm.  She underwent repair of the splenic artery aneurysm on 4/15.  She was placed on Plavix and aspirin post procedure.  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  Her pain did dissipate once the aneurysm was repaired.  She is continued with profound weakness.  She actually had normal no glycemia to hypoglycemia.  Actually held her insulin.  Been eating fair here.  Her  does all the cooking and uses excessive salt.  She does have extensive lower extremity edema up to the level of the hips.  She was restarted on her home Lasix twice daily as well as Aldactone was added.    Post procedure, she has done well.  She has worked with physical and occupational therapy.  Her son who is her primary caregiver was with her today at my assessment.  There was a concern about her strength.  Patient is declining going to rehab.  Son states that the last one she stated was about 33 days and then he pulled her after her home.  Unfortunately, she does not have a current primary care provider.  The son states he will get her one this week.  He is to work on getting her lift chair.  Have asked home health to follow the patient for physical, occupational, nursing, and social work.  She will need labs in a week as we have added Aldactone.    She did  "suffer some mild hepatic encephalopathy and her lactulose was increased.  We have asked him to increase this at home post discharge.    CT scan does reveal possible right breast mass.  It was not palpable on exam.  Will need outpatient mammogram. Will defer to her new Primary care.     Given her multiple medical problems, she is at high risk for readmission. She does not adhere to a low salt diet. Her son states she is stubborn. She does not keep her legs elevated. We discussed this with her as well.     Physical Exam on Discharge:  /80 (BP Location: Right arm, Patient Position: Sitting)   Pulse 71   Temp 98.7 °F (37.1 °C) (Oral)   Resp 16   Ht 167.6 cm (65.98\")   Wt 87.3 kg (192 lb 7 oz)   SpO2 98%   Breastfeeding? No   BMI 31.08 kg/m²    Physical Exam   Constitutional: She is oriented to person, place, and time. She appears well-developed.   Chronically ill appearing.    HENT:   Head: Normocephalic and atraumatic.   Eyes: Conjunctivae and EOM are normal. Pupils are equal, round, and reactive to light.   Neck: Neck supple. No JVD present. No thyromegaly present.   Cardiovascular: Normal rate, regular rhythm, normal heart sounds and intact distal pulses. Exam reveals no gallop and no friction rub.   No murmur heard.  Sinus 76-96 on telemetry.    Pulmonary/Chest: Effort normal. No respiratory distress. She has no wheezes. She has no rales. She exhibits no tenderness.   Diminished bilaterally.    Abdominal: Soft. Bowel sounds are normal. She exhibits no distension. There is no tenderness. There is no rebound and no guarding.   Musculoskeletal: Normal range of motion. She exhibits edema (to the level of the hips. 1-2+). She exhibits no tenderness or deformity.   Lymphadenopathy:     She has no cervical adenopathy.   Neurological: She is alert and oriented to person, place, and time. She displays normal reflexes. No cranial nerve deficit. She exhibits normal muscle tone.   Skin: Skin is warm and dry. No " rash noted.   Psychiatric: She has a normal mood and affect. Her behavior is normal. Judgment and thought content normal.     Condition on Discharge: stable with high risk of readmission.     Discharge Disposition:  Home or Self Care    Discharge Medications:     Discharge Medications      New Medications      Instructions Start Date   aspirin 81 MG EC tablet   81 mg, Oral, Daily      carvedilol 3.125 MG tablet  Commonly known as:  COREG   3.125 mg, Oral, Every 12 Hours Scheduled      clopidogrel 75 MG tablet  Commonly known as:  PLAVIX   75 mg, Oral, Daily      HYDROcodone-acetaminophen 5-325 MG per tablet  Commonly known as:  NORCO   1 tablet, Oral, Every 8 Hours PRN      spironolactone 25 MG tablet  Commonly known as:  ALDACTONE   25 mg, Oral, Daily   Start Date:  4/18/2019        Changes to Medications      Instructions Start Date   lactulose 20 GM/30ML solution solution  What changed:    · medication strength  · when to take this  · reasons to take this   20 g, Oral, 3 Times Daily      levothyroxine 88 MCG tablet  Commonly known as:  SYNTHROID, LEVOTHROID  What changed:    · medication strength  · how much to take   88 mcg, Oral, Daily         Continue These Medications      Instructions Start Date   CloNIDine 0.1 MG tablet  Commonly known as:  CATAPRES   0.1 mg, Oral, Once      furosemide 40 MG tablet  Commonly known as:  LASIX   40 mg, Oral, 2 Times Daily      insulin glargine 100 UNIT/ML injection  Commonly known as:  LANTUS   10 Units, Subcutaneous, 2 Times Daily         Stop These Medications    insulin aspart 100 UNIT/ML injection  Commonly known as:  novoLOG     POTASSIUM CHLORIDE ER PO          Discharge Diet: Diabetic, low salt     Activity at Discharge: as tolerated.     Follow-up Appointments:   New PCP as soon as possible.     Test Results Pending at Discharge: none    DEISI Joseph  04/17/19  1:30 PM    Time: 45 minutes.     I personally evaluated and examined the patient in  conjunction with DEISI Seay and agree with the assessment, treatment plan, and disposition of the patient as recorded by her. My history, exam, and further recommendations are: I have reviewed and agree with the plans. [place].

## 2019-04-17 NOTE — THERAPY DISCHARGE NOTE
Acute Care - Physical Therapy Discharge Summary  [facility]       Patient Name: Daniel Oliver  : 1950  MRN: [id number]    Today's Date: 2019  Onset of Illness/Injury or Date of Surgery: 19    Date of Referral to PT: 19  Referring Physician: DEISI Edwards      Admit Date: 2019      PT Recommendation and Plan    Visit Dx:    ICD-10-CM ICD-9-CM   1. Chest pain, unspecified type R07.9 786.50   2. Splenic artery aneurysm (CMS/HCC) I72.8 442.83   3. Impaired functional mobility, balance, gait, and endurance Z74.09 V49.89       Outcome Measures     Row Name 19 1500 19 1400          How much help from another person do you currently need...    Turning from your back to your side while in flat bed without using bedrails?  --  3  -SB     Moving from lying on back to sitting on the side of a flat bed without bedrails?  --  3  -SB     Moving to and from a bed to a chair (including a wheelchair)?  --  3  -SB     Standing up from a chair using your arms (e.g., wheelchair, bedside chair)?  --  3  -SB     Climbing 3-5 steps with a railing?  --  1  -SB     To walk in hospital room?  --  3  -SB     AM-PAC 6 Clicks Score  --  16  -SB        How much help from another is currently needed...    Putting on and taking off regular lower body clothing?  1  -CH  --     Bathing (including washing, rinsing, and drying)  2  -CH  --     Toileting (which includes using toilet bed pan or urinal)  2  -CH  --     Putting on and taking off regular upper body clothing  3  -CH  --     Taking care of personal grooming (such as brushing teeth)  4  -CH  --     Eating meals  4  -CH  --     Score  16  -CH  --        Functional Assessment    Outcome Measure Options  AM-PAC 6 Clicks Daily Activity (OT)  -CH  AM-PAC 6 Clicks Basic Mobility (PT)  -SB       User Key  (r) = Recorded By, (t) = Taken By, (c) = Cosigned By    Initials Name Provider Type    Renetta Carbajal, OTR/L Occupational Therapist    Kasey Montenegro  PT Physical Therapist          PT Charges     Row Name 04/17/19 1042             Time Calculation    Start Time  1008  -KJ      Stop Time  1037  -KJ      Time Calculation (min)  29 min  -KJ      PT Received On  04/17/19  -KJ      PT Goal Re-Cert Due Date  04/26/19  -KJ         Time Calculation- PT    Total Timed Code Minutes- PT  29 minute(s)  -KJ        User Key  (r) = Recorded By, (t) = Taken By, (c) = Cosigned By    Initials Name Provider Type    Gaby Jordan PTA Physical Therapy Assistant          Rehab Goal Summary     Row Name 04/17/19 1607             Physical Therapy Goals    Bed Mobility Goal Selection (PT)  bed mobility, PT goal 1  -KJ      Transfer Goal Selection (PT)  transfer, PT goal 1  -KJ      Gait Training Goal Selection (PT)  gait training, PT goal 1  -KJ         Bed Mobility Goal 1 (PT)    Activity/Assistive Device (Bed Mobility Goal 1, PT)  sit to supine;supine to sit  -KJ      [place] Level/Cues Needed (Bed Mobility Goal 1, PT)  supervision required  -KJ      Time Frame (Bed Mobility Goal 1, PT)  by discharge  -KJ      Progress/Outcomes (Bed Mobility Goal 1, PT)  goal not met  -KJ         Transfer Goal 1 (PT)    Activity/Assistive Device (Transfer Goal 1, PT)  sit-to-stand/stand-to-sit;walker, rolling  -KJ      [place] Level/Cues Needed (Transfer Goal 1, PT)  contact guard assist  -KJ      Time Frame (Transfer Goal 1, PT)  by discharge  -KJ      Progress/Outcome (Transfer Goal 1, PT)  goal not met  -KJ         Gait Training Goal 1 (PT)    Activity/Assistive Device (Gait Training Goal 1, PT)  gait (walking locomotion);improve balance and speed;decrease fall risk;diminish gait deviation;decrease asymmetrical patterns;walker, rolling  -KJ      [place] Level (Gait Training Goal 1, PT)  contact guard assist  -KJ      Distance (Gait Goal 1, PT)  75  -KJ      Time Frame (Gait Training Goal 1, PT)  by discharge  -KJ      Progress/Outcome (Gait Training Goal 1, PT)  goal not met   -NATALIA        User Key  (r) = Recorded By, (t) = Taken By, (c) = Cosigned By    Initials Name Provider Type Discipline    Gaby Jordan, PTA Physical Therapy Assistant PT          Therapy Charges for Today     Code Description Service Date Service Provider Modifiers Qty    59914178120 HC GAIT TRAINING EA 15 MIN 4/17/2019 Gaby Bennett, PTA GP 1    84382914814 HC PT THER PROC EA 15 MIN 4/17/2019 Gaby Bennett, PTA GP 1          PT Discharge Summary  Anticipated Discharge Disposition (PT): home  Reason for Discharge: Discharge from facility  Outcomes Achieved: Refer to plan of care for updates on goals achieved  Discharge Destination: Home      Gaby Bennett PTA   4/17/2019

## 2019-04-17 NOTE — DISCHARGE PLACEMENT REQUEST
"From:  Dayana May, JAME  [phone]      Jaylin Oliver (68 y.o. Female)     Date of Birth Social Security Number Address Home Phone MRN    1950  [address] [place] [address] [phone] [id number]    Hoah[employer]ism Marital Status          Other        Admission Date Admission Type Admitting Provider Attending Provider Department, Room/Bed    4/13/19 Emergency Brad Perry MD Truong, Khai C, MD [address], 410/1    Discharge Date Discharge Disposition Discharge Destination         Home or Self Care              Attending Provider:  Brad Perry MD    Allergies:  No Known Allergies    Isolation:  None   Infection:  None   Code Status:  CPR    Ht:  167.6 cm (65.98\")   Wt:  87.3 kg (192 lb 7 oz)    Admission Cmt:  None   Principal Problem:  Left upper quadrant pain [R10.12]                 Active Insurance as of 4/13/2019     Primary Coverage     Payor Plan Insurance Group Employer/Plan Group    MEDICARE MEDICARE A & B      Payor Plan Address Payor Plan Phone Number Payor Plan Fax Number Effective Dates    [address] [phone]  6/1/2015 - None Entered    [place] [id number]       Subscriber Name Subscriber Birth Date Member ID       JAYLIN OLIVER 1950 [id number]                 Emergency Contacts      (Rel.) Home Phone Work Phone Mobile Phone    ian wesley (Sister) -- -- [phone]    Joseph Oliver (Son) -- -- [phone]        Referral to Home Health [[id number]] (Order [id number])   Order   Date: 4/17/2019 Department: [address] Ordering/Authorizing: Luz Diaz APRN   Lab and Collection      Referral to Home Health (Order: [id number]) - 4/17/2019   Order History   Outpatient   Date/Time Action Taken User Additional Information   04/17/19 1329 Sign Luz Diaz APRN     Order Details      Frequency Duration Priority Order Class   None None Routine Internal Referral   Start Date/Time      Start Date "   04/17/19   Order Questions      Question Answer Comment   Face to Face Visit Date 4/17/2019     Follow-up Provider for Plan of Care? I treated the patient in an acute care facility and will not continue treatment after discharge.     Follow-up Provider DINA MONROE     Reason/Clinical Findings general debility. cirrhosis     Describe mobility limitations that make leaving home difficult as above     Nursing/Therapeutic Services Requested Skilled Nursing       Physical Therapy       Occupational Therapy       Speech Therapy       Medical / Social Work     Skilled nursing orders: Cardiopulmonary assessments cbc, bmp in 1 week with results to Dr. Monroe     Neurovascular assessments       Medication education     PT orders: Therapeutic exercise       Gait Training       Strengthening     Weight Bearing Status As Tolerated     Occupational orders: Activities of daily living pt needs bath aid as well.      Home safety assessment       Energy conservation       Strengthening     Social work orders: Community resources     Frequency: 1 Week 1             Source Order Set / Preference List      Order Set   [facility] GEN EXPRESS DISCHARGE [[id number]]   Clinical Indications               ICD-10-CM ICD-9-CM   Chest pain, unspecified type  - Primary     R07.9 786.50   Splenic artery aneurysm (CMS/HCC)     I72.8 442.83   Impaired functional mobility, balance, gait, and endurance     Z74.09 V49.89   Reprint Order Requisition      AMB REFERRAL TO HOME HEALTH (Order #[id number]) on 4/17/19   Encounter-Level Cardiology Documents:      There are no encounter-level cardiology documents.   Encounter      View Encounter       Order Provider Info          Office phone Pager E-mail   Ordering User Luz Diaz APRN [phone] -- --   Authorizing Provider Luz Diaz APRN [phone] -- --   Attending Provider Brad Perry [phone] -- --   Linked Charges      No charges linked to this  procedure   Order Transmittal Tracking      AMB REFERRAL TO HOME HEALTH (Order #[id number]) on 4/17/19   Authorized by:  DEISI Joseph  (NPI: [id number])       Lab Component SmartPhrase Guide      AMB REFERRAL TO HOME HEALTH (Order #[id number]) on 4/17/19                    History & Physical      Jhon Linda MD at 4/13/2019 10:10 PM              [facility] Medicine Services  HISTORY AND PHYSICAL    Date of Admission: 4/13/2019  Primary Care Physician: See Fritz DO    Subjective     Chief Complaint: Chest pain    History of Present Illness  Patient is 68-year-old -American female with past medical history significant for diabetes as well as cirrhosis undetermined etiology.  She complains of a 5-day history of left-sided chest wall pain and probably more left upper quadrant abdominal pain.  This started at rest and his continued in intensity and is slightly worse on Saturday when she presented to an outside facility.  At that time she had an elevated troponin other scale of 0.134 she was transferred here  And found to have a troponin of 0.023.  She also had a CT scan of the chest performed which shows no evidence of a PE bibasilar atelectasis no pneumonia patient has a questionable right breast mass however on physical exam I am unable to palpate anything.  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  She has been transferred here for further evaluation of chest pain I do not believe the ER doctor had a final report regarding his splenic artery aneurysm at the time he called me.  Patient also has cholelithiasis on CT scan.  She is tender to palpation up there and essentially it hurts to take a deep breath and has pain with range of motion.  She is very tender really up in the left upper abdominal and left chest wall area lower chest wall.  She  denies cough fever congestion change in bowels change in appetite nausea vomiting hematemesis or melena.  She was given a dose of Lovenox and the outlying emergency room.  Will hold anticoagulants.      Review of Systems   14 point review of systems negative except for as per HPI    Otherwise complete ROS reviewed and negative except as mentioned in the HPI.    Past Medical History:   Past Medical History:   Diagnosis Date   • Diabetes mellitus (CMS/HCC)    • Disease of thyroid gland    • Hypertension    • Liver cirrhosis (CMS/HCC)      Past Surgical History:  Past Surgical History:   Procedure Laterality Date   • HIP ARTHROPLASTY       Social History:  reports that she has never smoked. She does not have any smokeless tobacco history on file. She reports that she does not use drugs.    Family History:   No history of cirrhosis    Allergies:  No Known Allergies  Medications:  Prior to Admission medications    Medication Sig Start Date End Date Taking? Authorizing Provider   CloNIDine (CATAPRES) 0.1 MG tablet Take 0.1 mg by mouth 1 (One) Time.   Yes Yudelka Donald MD   furosemide (LASIX) 40 MG tablet Take 40 mg by mouth 2 (Two) Times a Day.   Yes Yudelka Donald MD   insulin aspart (novoLOG) 100 UNIT/ML injection Inject  under the skin into the appropriate area as directed 3 (Three) Times a Day Before Meals.   Yes Yudelka Donald MD   insulin glargine (LANTUS) 100 UNIT/ML injection Inject 10 Units under the skin into the appropriate area as directed 2 (Two) Times a Day.   Yes Yudelka Donald MD   lactulose (CHRONULAC) 10 GM/15ML solution Take 20 g by mouth 2 (Two) Times a Day As Needed.   Yes Yudelka Donald MD   levothyroxine (SYNTHROID, LEVOTHROID) 75 MCG tablet Take 75 mcg by mouth Daily.   Yes Yudelka Donald MD   POTASSIUM CHLORIDE ER PO Take  by mouth.   Yes Yudelka Donald MD     Objective     Vital Signs: /66 (BP Location: Right arm, Patient Position: Lying)    "Pulse 81   Temp 97.9 °F (36.6 °C) (Oral)   Resp 18   Ht 167.6 cm (65.98\")   Wt 83.6 kg (184 lb 5 oz)   SpO2 99%   BMI 29.76 kg/m²    Physical Exam  Gen. well-nourished well-developed -American female with vitiligo in no acute distress  HEENT: Atraumatic normocephalic pupils equal round reactive to light extraocular movements intact sclerae anicteric TMs negative mucous membranes moist neck is supple without lymphadenopathy no JVD is noted no carotid bruits are auscultated oropharynx is without erythema or exudate appears to have a tumor on the left side of the face  Chest: Clear to auscultation percussion tenderness to palpation left lower chest wall in the lower rib area this extends down to the left upper quadrant of the abdomen she does have splenomegaly noted.    CV: Regular rate and rhythm normal S1-S2 no gallops murmurs or rubs  Abdomen: Soft tender left upper quadrant nondistended active bowel sounds no hepatomegaly moderate palpable spleen very tender no masses no hernias  Extremities: No clubbing edema or cyanosis capillary refill is normal pulses are 2+ and symmetric at radial and dorsalis pedis posterior tibial pulses are intact and 2+ palpable  Neuro: Patient is awake alert and oriented ×3, cranial nerves II through XII are grossly intact, motor is 5 out of 5 bilaterally, DTRs are 2+ and symmetric bilaterally sensory exam is nonfocal  Skin: Warm dry and intact no evidence of breakdown  Sensorium: Normal thought and affect  Nursing notes and vital signs reviewed        Results Reviewed:  Lab Results (last 24 hours)     Procedure Component Value Units Date/Time    Troponin [[id number]]  (Normal) Collected:  04/13/19 2037    Specimen:  Blood Updated:  04/13/19 2106     Troponin I 0.026 ng/mL     TSH [[id number]]  (Abnormal) Collected:  04/13/19 1724    Specimen:  Blood Updated:  04/13/19 1914     TSH 6.160 mIU/mL     T4, Free [[id number]]  (Normal) Collected:  04/13/19 1724    Specimen:  Blood " Updated:  04/13/19 1900     Free T4 1.40 ng/dL     Urinalysis With Culture If Indicated - Urine, Catheter [[id number]]  (Abnormal) Collected:  04/13/19 1809    Specimen:  Urine, Catheter Updated:  04/13/19 1820     Color, UA Yellow     Appearance, UA Cloudy     pH, UA 6.5     Specific Gravity, UA 1.012     Glucose, UA Negative     Ketones, UA Negative     Bilirubin, UA Negative     Blood, UA Moderate (2+)     Protein, UA Negative     Leuk Esterase, UA Trace     Nitrite, UA Negative     Urobilinogen, UA 2.0 E.U./dL    Urinalysis, Microscopic Only - Urine, Catheter [[id number]]  (Abnormal) Collected:  04/13/19 1809    Specimen:  Urine, Catheter Updated:  04/13/19 1820     RBC, UA 21-30 /HPF      WBC, UA 3-5 /HPF      Bacteria, UA None Seen /HPF      Squamous Epithelial Cells, UA 0-2 /HPF      Hyaline Casts, UA None Seen /LPF      Methodology Automated Microscopy    Troponin [[id number]]  (Normal) Collected:  04/13/19 1724    Specimen:  Blood Updated:  04/13/19 1756     Troponin I 0.023 ng/mL     BNP [[id number]]  (Normal) Collected:  04/13/19 1724    Specimen:  Blood Updated:  04/13/19 1751     proBNP 126.0 pg/mL     Myoglobin, Serum [[id number]]  (Normal) Collected:  04/13/19 1724    Specimen:  Blood Updated:  04/13/19 1751     Myoglobin 97.8 ng/mL     Protime-INR [[id number]]  (Abnormal) Collected:  04/13/19 1724    Specimen:  Blood Updated:  04/13/19 1742     Protime 18.6 Seconds      INR 1.51    aPTT [[id number]]  (Abnormal) Collected:  04/13/19 1724    Specimen:  Blood Updated:  04/13/19 1742     PTT 58.4 seconds     D-dimer, Quantitative [[id number]]  (Abnormal) Collected:  04/13/19 1724    Specimen:  Blood Updated:  04/13/19 1742     D-Dimer, Quantitative 2.31 mg/L (FEU)     Narrative:       Reference Range is 0-0.50 mg/L FEU. However, results <0.50 mg/L FEU tends to rule out DVT or PE. Results >0.50 mg/L FEU are not useful in predicting absence or presence of DVT or PE.    CK [[id number]]  (Abnormal) Collected:   04/13/19 1724    Specimen:  Blood Updated:  04/13/19 1741     Creatine Kinase 286 U/L     Basic Metabolic Panel [[id number]]  (Abnormal) Collected:  04/13/19 1724    Specimen:  Blood Updated:  04/13/19 1741     Glucose 98 mg/dL      BUN 11 mg/dL      Creatinine 0.84 mg/dL      Sodium 139 mmol/L      Potassium 3.8 mmol/L      Chloride 108 mmol/L      CO2 29.0 mmol/L      Calcium 8.5 mg/dL      eGFR   Amer 82 mL/min/1.73      BUN/Creatinine Ratio 13.1     Anion Gap 2.0 mmol/L     Narrative:       GFR Normal >60  Chronic Kidney Disease <60  Kidney Failure <15    CBC & Differential [[id number]] Collected:  04/13/19 1724    Specimen:  Blood Updated:  04/13/19 1735    Narrative:       The following orders were created for panel order CBC & Differential.  Procedure                               Abnormality         Status                     ---------                               -----------         ------                     CBC Auto Differential[[id number]]        Abnormal            Final result                 Please view results for these tests on the individual orders.    CBC Auto Differential [[id number]]  (Abnormal) Collected:  04/13/19 1724    Specimen:  Blood Updated:  04/13/19 1735     WBC 3.48 10*3/mm3      RBC 3.73 10*6/mm3      Hemoglobin 10.8 g/dL      Hematocrit 32.9 %      MCV 88.2 fL      MCH 29.0 pg      MCHC 32.8 g/dL      RDW 15.8 %      RDW-SD 50.7 fl      MPV 11.6 fL      Platelets 78 10*3/mm3      Neutrophil % 55.4 %      Lymphocyte % 25.0 %      Monocyte % 13.8 %      Eosinophil % 4.9 %      Basophil % 0.6 %      Immature Grans % 0.3 %      Neutrophils, Absolute 1.93 10*3/mm3      Lymphocytes, Absolute 0.87 10*3/mm3      Monocytes, Absolute 0.48 10*3/mm3      Eosinophils, Absolute 0.17 10*3/mm3      Basophils, Absolute 0.02 10*3/mm3      Immature Grans, Absolute 0.01 10*3/mm3      nRBC 0.0 /100 WBC         Imaging Results (last 24 hours)     Procedure Component Value Units Date/Time    CT  Angiogram Chest With Contrast [[id number]] Collected:  04/13/19 1859     Updated:  04/13/19 1910    Narrative:       Exam: CT angiogram of the of the chest with intravenous contrast.     CLINICAL HISTORY:  Chest pain.     DOSE:  387 mGycm. Automated exposure control was utilized to diminish  patient radiation dose.     TECHNIQUE: Contiguous axial images were obtained through the thorax  following intravenous contrast administration with reformatted images  obtained in the sagittal and coronal projections from the original data  set. Three-dimensional reconstructions are also obtained.     COMPARISON:  None available     FINDINGS:     Pulmonary arteries:  There is normal enhancement of the pulmonary  arteries with no evidence of pulmonary thromboembolic disease.     Aorta:  The thoracic aorta and proximal great vessels are normal in  appearance. There is no evidence of aortic dissection or aneurysm.     LUNGS:  Bibasilar atelectasis is present. The lungs are otherwise clear  without evidence of lobar pneumonia or effusion.     Pleural spaces: Unremarkable. No evidence of pleural effusion or  pneumothorax.     HEART: There is moderate cardiomegaly. No evidence of right ventricular  dysfunction. Coronary artery calcifications are noted in the LAD  distribution. No evidence of pericardial effusion.     Bones: No acute osseous abnormalities are demonstrated.     CHEST WALL: There is a masslike density associated with the right breast  and correlation is recommended with the physical exam. This may simply  represent some asymmetric breast tissue that is certainly asymmetric  with the contralateral breast. There is an enlarged right axillary node  measuring 13 mm in short axis.     Lymph nodes: No enlarged mediastinal or left axillary adenopathy is  present.     Upper abdomen: The liver is cirrhotic in appearance. Cholelithiasis is  present. The spleen is enlarged suggesting portal hypertension. There  are suspected  varicosities within the upper abdomen. 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 This appears to emanate from the proximal left splenic artery.       Impression:       1. No evidence of pulmonary thromboembolic disease.  2. Bibasilar atelectasis. No evidence of lobar pneumonia or effusion.  3. There is a suspected right breast mass with asymmetric soft tissue  density within the breast. There is no enlarged right axillary node.  Correlation is recommended with the patient's physical exam. If not  recently undertaken follow-up with outpatient mammography would be  suggested.  4. Cirrhotic changes of the liver with splenomegaly and suspected upper  abdominal varices. FINDINGS suggest portal hypertension. There is a 2.1  cm aneurysm which appears to emanate from the proximal splenic artery  within the anterior upper abdomen.  This report was finalized on 04/13/2019 19:07 by Dr. Ace Moncada MD.        I have personally reviewed and interpreted the radiology studies and ECG obtained at time of admission.     Assessment / Plan     Assessment:   Active Hospital Problems    Diagnosis   • Chest pain   1.  Chest pain probably also involving abdominal pain I believe this is related to splenomegaly and splenic aneurysm will consult vascular surgery for further recommendations.  Due to the elevated troponin will also consult cardiology for recommendations.  Her troponins are currently trended down in the normal range.  I am not sure if this is part of the process or just a mild abnormality.  Will also check 2D echo.  2.  Abdominal pain splenomegaly aneurysm will do ultrasound of liver and spleen with Dopplers.  Will consult vascular surgery.  Hold anticoagulants currently.  Defer further testing and management to vascular surgery.  3.  Cirrhosis with evidence of varices continue current medications may need to consult GI she does seem to be followed by someone down in  [place] who has been managing this per the  for many years.  Defer per the further workup and plans per primary team.  4.  Type 2 diabetes cover sliding scale insulin Accu-Cheks.      Patient seen prior to midnight         Code Status: Full     I discussed the patient's findings and my recommendations with the patient and her  is her healthcare power surrogate    Estimated length of stay to be determined    Jhon Linda MD   04/13/19   11:55 PM              Electronically signed by Jhon Linda MD at 4/14/2019  2:29 AM          Physician Progress Notes (most recent note)      Danni Chambers APRN at 4/17/2019 12:41 PM             LOS: 1 day   Patient Care Team:  See Fritz DO as PCP - General  See Fritz DO as PCP - Family Medicine    Chief Complaint: Status post splenic artery aneurysm repair    Subjective     Patient seen/examined with no complaints of abdominal pain since surgery.  AVSS, blood pressure slightly elevated, afebrile.    Objective     Vital Signs  Temp:  [98.1 °F (36.7 °C)-98.7 °F (37.1 °C)] 98.7 °F (37.1 °C)  Heart Rate:  [65-78] 71  Resp:  [16-18] 16  BP: (134-171)/(59-92) 171/77    Physical Exam   Constitutional: She is oriented to person, place, and time. Vital signs are normal. She appears well-developed and well-nourished. No distress.   Chronically ill-appearing   HENT:   Head: Normocephalic and atraumatic.   Mouth/Throat: Oropharynx is clear and moist.   Eyes: Pupils are equal, round, and reactive to light. No scleral icterus.   Neck: Normal range of motion. Neck supple. No JVD present. Carotid bruit is not present. No thyromegaly present.   Cardiovascular: Normal rate, regular rhythm, S2 normal, normal heart sounds, intact distal pulses and normal pulses. Exam reveals no gallop and no friction rub.   No murmur heard.  Pulmonary/Chest: Effort normal and breath sounds normal.   Abdominal: Soft. Normal aorta and bowel sounds are normal.  There is no hepatosplenomegaly. There is no tenderness.   Musculoskeletal: Normal range of motion. She exhibits edema (To bilateral lower extremities).   Neurological: She is alert and oriented to person, place, and time. No cranial nerve deficit.   Skin: Skin is warm and dry. She is not diaphoretic.   Psychiatric: She has a normal mood and affect. Her behavior is normal. Judgment and thought content normal.   Nursing note and vitals reviewed.      Laboratory Data:   Results from last 7 days   Lab Units 04/17/19  0555 04/16/19  0600 04/15/19  0814   WBC 10*3/mm3 4.63* 7.07 3.19*   HEMOGLOBIN g/dL 9.5* 9.4* 8.9*   HEMATOCRIT % 29.2* 29.0* 27.0*   PLATELETS 10*3/mm3 67* 82* 74*       Results from last 7 days   Lab Units 04/17/19  0555 04/16/19  0600 04/15/19  0814 04/14/19  0316   SODIUM mmol/L 137 137 136 139   POTASSIUM mmol/L 3.4* 3.6 3.8 3.9   CHLORIDE mmol/L 105 107 108 109   CO2 mmol/L 26.0 25.0 25.0 26.0   BUN mg/dL 13 15 15 13   CREATININE mg/dL 0.96 0.94 0.96 0.82   CALCIUM mg/dL 8.2* 8.1* 7.8* 8.1*   BILIRUBIN mg/dL  --  2.1* 2.4* 2.1*   ALK PHOS U/L  --  197* 191* 249*   ALT (SGPT) U/L  --  30 27 27   AST (SGOT) U/L  --  55* 42 47*   GLUCOSE mg/dL 131* 118* 81 128*     Results from last 7 days   Lab Units 04/13/19  1724   INR  1.51*   APTT seconds 58.4*            Medication Review: Reviewed    Assessment/Plan       Left upper quadrant pain    Chest pain    Splenic artery aneurysm (CMS/HCC)    Splenomegaly    Elevated troponin    Liver cirrhosis (CMS/HCC)    Diabetes mellitus (CMS/HCC)    Disease of thyroid gland    Normocytic anemia    Pancytopenia (CMS/HCC)      Plan:  He is doing well and from a surgical standpoint would be okay to be discharged when okay with admitting physician.  He reports his abdominal pain has completely resolved.  He will need to follow-up in 2 weeks with our office.    DEISI lAba  Vascular Surgery  [phone]  04/17/19  12:41 PM      Electronically signed by  Danni Chambers, DEISI at 4/17/2019 12:44 PM          Consult Notes (most recent note)      Joel Orosco DO at 4/14/2019 11:08 AM      Consult Orders    1. Inpatient Vascular Surgery Consult [[id number]] ordered by Jhon Linda MD at 04/14/19 0220                Daniel Oliver  [id number]  [id number]  410/1  Jay Mckeon MD  4/13/2019    Chief Complaint   Patient presents with   • Chest Pain       HPI: Daniel Oliver is a 68 y.o. female with history of hypertension, type II diabetes, Hypothyroidism, and Liver cirrhosis. Patient presented to an outlying facility for left sided flank pain from left leg up to left breast. She denied any chest pain. There was report of an elevated troponin of 0.125. However the 4 troponins checked here have been normal. EKG reviewed with no acute abnormality. 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       Past Medical History:   Diagnosis Date   • Diabetes mellitus (CMS/HCC)    • Disease of thyroid gland    • Hypertension    • Liver cirrhosis (CMS/HCC)        Past Surgical History:   Procedure Laterality Date   • HIP ARTHROPLASTY         History reviewed. No pertinent family history.    Social History     Socioeconomic History   • Marital status:      Spouse name: Not on file   • Number of children: Not on file   • Years of education: Not on file   • Highest education level: Not on file   Tobacco Use   • Smoking status: Never Smoker   Substance and Sexual Activity   • Drug use: No   • Sexual activity: Defer       No Known Allergies    Hospital Medications (active)       Dose Frequency Start End    aspirin tablet 325 mg 325 mg Daily 4/14/2019     Sig - Route: Take 1 tablet by mouth Daily. - Oral    carvedilol (COREG) tablet  3.125 mg 3.125 mg Every 12 Hours Scheduled 4/14/2019     Sig - Route: Take 1 tablet by mouth Every 12 (Twelve) Hours. - Oral    CloNIDine (CATAPRES) tablet 0.1 mg 0.1 mg Every 6 Hours PRN 4/13/2019     Sig - Route: Take 1 tablet by mouth Every 6 (Six) Hours As Needed for High Blood Pressure (SBP>160). - Oral    dextrose (D50W) 25 g/ 50mL Intravenous Solution 25 g 25 g Every 15 Minutes PRN 4/13/2019     Sig - Route: Infuse 50 mL into a venous catheter Every 15 (Fifteen) Minutes As Needed for Low Blood Sugar (Blood Sugar Less Than 70). - Intravenous    dextrose (GLUTOSE) oral gel 15 g 15 g Every 15 Minutes PRN 4/13/2019     Sig - Route: Take 15 g by mouth Every 15 (Fifteen) Minutes As Needed for Low Blood Sugar (Blood sugar less than 70). - Oral    furosemide (LASIX) tablet 40 mg 40 mg 2 Times Daily (Diuretics) 4/14/2019     Sig - Route: Take 1 tablet by mouth 2 (Two) Times a Day. - Oral    glucagon (human recombinant) (GLUCAGEN DIAGNOSTIC) injection 1 mg 1 mg As Needed 4/13/2019     Sig - Route: Inject 1 mg under the skin into the appropriate area as directed As Needed (Blood Glucose Less Than 70). - Subcutaneous    insulin lispro (humaLOG) injection 0-9 Units 0-9 Units 4 Times Daily With Meals & Nightly 4/14/2019     Sig - Route: Inject 0-9 Units under the skin into the appropriate area as directed 4 (Four) Times a Day With Meals & at Bedtime. - Subcutaneous    iopamidol (ISOVUE-370) 76 % injection 150 mL 150 mL Once in Imaging 4/13/2019 4/13/2019    Sig - Route: Infuse 150 mL into a venous catheter Once. - Intravenous    lactulose solution 20 g 20 g 2 Times Daily 4/14/2019     Sig - Route: Take 30 mL by mouth 2 (Two) Times a Day. - Oral    levothyroxine (SYNTHROID, LEVOTHROID) tablet 88 mcg 88 mcg Every Early Morning 4/14/2019     Sig - Route: Take 1 tablet by mouth Every Morning. - Oral    Morphine sulfate (PF) injection 1 mg 1 mg Every 4 Hours PRN 4/13/2019 4/23/2019    Sig - Route: Infuse 0.5 mL into a venous  "catheter Every 4 (Four) Hours As Needed for Severe Pain  (Chest pain). - Intravenous    Linked Group 1:  \"And\" Linked Group Details        naloxone (NARCAN) injection 0.4 mg 0.4 mg Every 5 Minutes PRN 4/13/2019     Sig - Route: Infuse 1 mL into a venous catheter Every 5 (Five) Minutes As Needed for Respiratory Depression. - Intravenous    Linked Group 1:  \"And\" Linked Group Details        nitroglycerin (NITROSTAT) SL tablet 0.4 mg 0.4 mg Every 5 Minutes PRN 4/13/2019     Sig - Route: Place 1 tablet under the tongue Every 5 (Five) Minutes As Needed for Chest Pain. - Sublingual    ondansetron (ZOFRAN) injection 4 mg 4 mg Every 6 Hours PRN 4/13/2019     Sig - Route: Infuse 2 mL into a venous catheter Every 6 (Six) Hours As Needed for Nausea or Vomiting. - Intravenous    Linked Group 2:  \"Or\" Linked Group Details        ondansetron (ZOFRAN) tablet 4 mg 4 mg Every 6 Hours PRN 4/13/2019     Sig - Route: Take 1 tablet by mouth Every 6 (Six) Hours As Needed for Nausea or Vomiting. - Oral    Linked Group 2:  \"Or\" Linked Group Details        perflutren (DEFINITY) lipid microspheres injection 8.476 mg 1.3 mL Once 4/14/2019 4/14/2019    Sig - Route: Infuse 1.3 mL into a venous catheter 1 (One) Time. - Intravenous    rosuvastatin (CRESTOR) tablet 20 mg 20 mg Nightly 4/14/2019     Sig - Route: Take 1 tablet by mouth Every Night. - Oral    sodium chloride 0.45 % infusion 100 mL/hr Continuous 4/14/2019     Sig - Route: Infuse 100 mL/hr into a venous catheter Continuous. - Intravenous    sodium chloride 0.9 % flush 10 mL 10 mL As Needed 4/13/2019     Sig - Route: Infuse 10 mL into a venous catheter As Needed for Line Care. - Intravenous    Linked Group 3:  \"And\" Linked Group Details        sodium chloride 0.9 % flush 3 mL 3 mL Every 12 Hours Scheduled 4/14/2019     Sig - Route: Infuse 3 mL into a venous catheter Every 12 (Twelve) Hours. - Intravenous    sodium chloride 0.9 % flush 3-10 mL 3-10 mL As Needed 4/13/2019     Sig - Route: " "Infuse 3-10 mL into a venous catheter As Needed for Line Care. - Intravenous    enoxaparin (LOVENOX) syringe 40 mg (Discontinued) 40 mg Every 24 Hours 4/14/2019 4/14/2019    Sig - Route: Inject 0.4 mL under the skin into the appropriate area as directed Daily. - Subcutaneous    levothyroxine (SYNTHROID, LEVOTHROID) tablet 75 mcg (Discontinued) 75 mcg Every Early Morning 4/14/2019 4/14/2019    Sig - Route: Take 1 tablet by mouth Every Morning. - Oral    sodium chloride 0.9 % infusion (Discontinued) 100 mL/hr Continuous 4/14/2019 4/14/2019    Sig - Route: Infuse 100 mL/hr into a venous catheter Continuous. - Intravenous    sodium chloride 0.9 % with KCl 20 mEq/L infusion (Discontinued) 100 mL/hr Continuous 4/14/2019 4/14/2019    Sig - Route: Infuse 100 mL/hr into a venous catheter Continuous. - Intravenous          Review of Systems   Constitutional: Negative.    HENT: Negative.    Eyes: Negative.    Respiratory: Negative.    Cardiovascular: Negative.    Gastrointestinal: Positive for abdominal pain.   Endocrine: Negative.    Genitourinary: Negative.    Musculoskeletal: Negative.    Skin: Negative.    Allergic/Immunologic: Negative.    Neurological: Negative.    Hematological: Negative.    Psychiatric/Behavioral: Negative.    All other systems reviewed and are negative.      BP (P) 100/58   Pulse (P) 72   Temp 99 °F (37.2 °C) (Oral)   Resp 20   Ht 167.6 cm (65.98\")   Wt 83.6 kg (184 lb 4.9 oz)   SpO2 97%   BMI 29.76 kg/m²      Physical Exam   Constitutional: She is oriented to person, place, and time. She appears well-developed and well-nourished.   HENT:   Head: Normocephalic and atraumatic.   Eyes: Pupils are equal, round, and reactive to light. No scleral icterus.   Neck: Neck supple. No JVD present. Carotid bruit is not present. No thyromegaly present.   Cardiovascular: Normal rate, regular rhythm and normal heart sounds.   Pulses:       Carotid pulses are 2+ on the right side, and 2+ on the left side.      "  Femoral pulses are 2+ on the right side, and 2+ on the left side.       Popliteal pulses are 2+ on the right side, and 2+ on the left side.        Dorsalis pedis pulses are 2+ on the right side, and 2+ on the left side.        Posterior tibial pulses are 2+ on the right side, and 2+ on the left side.   Pulmonary/Chest: Effort normal and breath sounds normal.   Abdominal: Soft. Bowel sounds are normal. She exhibits no distension, no abdominal bruit and no mass. There is no hepatosplenomegaly. There is tenderness.   Musculoskeletal: Normal range of motion. She exhibits no edema.   Lymphadenopathy:     She has no cervical adenopathy.   Neurological: She is alert and oriented to person, place, and time. She has normal strength. No cranial nerve deficit or sensory deficit.   Skin: Skin is warm, dry and intact.   Psychiatric: She has a normal mood and affect.   Nursing note and vitals reviewed.      Laboratory Data:  Results from last 7 days   Lab Units 04/14/19  0024 04/13/19  1724   WBC 10*3/mm3 3.18* 3.48*   HEMOGLOBIN g/dL 9.5* 10.8*   HEMATOCRIT % 28.4* 32.9*   PLATELETS 10*3/mm3 72* 78*       Results from last 7 days   Lab Units 04/14/19  0316 04/13/19 2037 04/13/19  1724   SODIUM mmol/L 139  --  139   POTASSIUM mmol/L 3.9  --  3.8   CHLORIDE mmol/L 109  --  108   CO2 mmol/L 26.0  --  29.0   BUN mg/dL 13  --  11   CREATININE mg/dL 0.82  --  0.84   CALCIUM mg/dL 8.1*  --  8.5   BILIRUBIN mg/dL 2.1* 2.5*  --    ALK PHOS U/L 249* 312*  --    ALT (SGPT) U/L 27 30  --    AST (SGOT) U/L 47* 60*  --    GLUCOSE mg/dL 128*  --  98     Results from last 7 days   Lab Units 04/13/19  1724   INR  1.51*   APTT seconds 58.4*         Diagnostic Data:  Imaging Results (last 24 hours)     Procedure Component Value Units Date/Time    CT Angiogram Chest With Contrast [[id number]] Collected:  04/13/19 1859     Updated:  04/13/19 1910    Narrative:       Exam: CT angiogram of the of the chest with intravenous contrast.     CLINICAL  HISTORY:  Chest pain.     DOSE:  387 mGycm. Automated exposure control was utilized to diminish  patient radiation dose.     TECHNIQUE: Contiguous axial images were obtained through the thorax  following intravenous contrast administration with reformatted images  obtained in the sagittal and coronal projections from the original data  set. Three-dimensional reconstructions are also obtained.     COMPARISON:  None available     FINDINGS:     Pulmonary arteries:  There is normal enhancement of the pulmonary  arteries with no evidence of pulmonary thromboembolic disease.     Aorta:  The thoracic aorta and proximal great vessels are normal in  appearance. There is no evidence of aortic dissection or aneurysm.     LUNGS:  Bibasilar atelectasis is present. The lungs are otherwise clear  without evidence of lobar pneumonia or effusion.     Pleural spaces: Unremarkable. No evidence of pleural effusion or  pneumothorax.     HEART: There is moderate cardiomegaly. No evidence of right ventricular  dysfunction. Coronary artery calcifications are noted in the LAD  distribution. No evidence of pericardial effusion.     Bones: No acute osseous abnormalities are demonstrated.     CHEST WALL: There is a masslike density associated with the right breast  and correlation is recommended with the physical exam. This may simply  represent some asymmetric breast tissue that is certainly asymmetric  with the contralateral breast. There is an enlarged right axillary node  measuring 13 mm in short axis.     Lymph nodes: No enlarged mediastinal or left axillary adenopathy is  present.     Upper abdomen: The liver is cirrhotic in appearance. Cholelithiasis is  present. The spleen is enlarged suggesting portal hypertension. There  are suspected varicosities within the upper abdomen. 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 This appears to emanate from the proximal left splenic artery.        Impression:       1. No evidence of pulmonary thromboembolic disease.  2. Bibasilar atelectasis. No evidence of lobar pneumonia or effusion.  3. There is a suspected right breast mass with asymmetric soft tissue  density within the breast. There is no enlarged right axillary node.  Correlation is recommended with the patient's physical exam. If not  recently undertaken follow-up with outpatient mammography would be  suggested.  4. Cirrhotic changes of the liver with splenomegaly and suspected upper  abdominal varices. FINDINGS suggest portal hypertension. There is a 2.1  cm aneurysm which appears to emanate from the proximal splenic artery  within the anterior upper abdomen.  This report was finalized on 04/13/2019 19:07 by Dr. Ace Moncada MD.          Impression:    Chest pain    Left upper quadrant pain    Splenic artery aneurysm (CMS/HCC)    Splenomegaly    Elevated troponin    Liver cirrhosis (CMS/HCC)    Diabetes mellitus (CMS/HCC)    Disease of thyroid gland  2.1 cm splenic artery aneurysm    Plan: After thoroughly evaluating Daniel Oliver, I believe the best course of action is to proceed with endovascular exclusion of the aneurysm.  I believe that a Viabahn stent graft can be placed across the safely to exclude the small saccular aneurysm.  Risk/benefits were explained at great length to the patient which include but not limited to bleeding, infection, vessel damage, vessel rupture, MI, stroke, and death.  The patient understands the risks and wished for me to proceed.  Thank you for allowing me to see Daniel Oliver in consult. Please feel free to reach out with any questions or concerns.    Joel Orosco DO  4/14/2019  11:08 AM       Electronically signed by Joel Orosco DO at 4/14/2019 11:13 AM          Physical Therapy Notes (most recent note)      Gaby Bennett, VERONICA at 4/17/2019 10:45 AM  Version 1 of 1         Acute Care - Physical Therapy Treatment Note   [place]     Patient  Name: Daniel Oliver  : 1950  MRN: [id number]  Today's Date: 2019  Onset of Illness/Injury or Date of Surgery: 19  Date of Referral to PT: 19  Referring Physician: DEISI Edwards    Admit Date: 2019    Visit Dx:    ICD-10-CM ICD-9-CM   1. Chest pain, unspecified type R07.9 786.50   2. Splenic artery aneurysm (CMS/HCC) I72.8 442.83   3. Impaired functional mobility, balance, gait, and endurance Z74.09 V49.89     Patient Active Problem List   Diagnosis   • Chest pain   • Left upper quadrant pain   • Splenic artery aneurysm (CMS/HCC)   • Splenomegaly   • Elevated troponin   • Liver cirrhosis (CMS/HCC)   • Diabetes mellitus (CMS/HCC)   • Disease of thyroid gland   • Normocytic anemia   • Pancytopenia (CMS/HCC)       Therapy Treatment    Rehabilitation Treatment Summary     Row Name 19 1008             Treatment Time/Intention    Discipline  physical therapy assistant  -KJ      Document Type  therapy note (daily note)  -KJ      Subjective Information  complains of;weakness  -KJ      Mode of Treatment  physical therapy  -KJ      Patient Effort  good  -KJ      Existing Precautions/Restrictions  fall  -KJ      Treatment Considerations/Comments  legs very edematous; limited foot clearance during gait  -KJ      Recorded by [KJ] Gaby Bennett PTA 19 1042      Row Name 19 1008             Bed Mobility Assessment/Treatment    Supine-Sit [place] (Bed Mobility)  verbal cues;supervision  -KJ      Sit-Supine [place] (Bed Mobility)  verbal cues;moderate assist (50% patient effort)  -KJ      Bed Mobility, Safety Issues  decreased use of legs for bridging/pushing  -KJ      Comment (Bed Mobility)  assitance with BLE to get up into bed  -KJ      Recorded by [KJ] Gaby Bennett PTA 19 1042      Row Name 19 1008             Sit-Stand Transfer    Sit-Stand [place] (Transfers)  verbal cues;minimum assist (75% patient effort)  -KJ      Assistive Device (Sit-Stand  Transfers)  walker, front-wheeled  -KJ      Recorded by [KJ] Gaby Bennett [facility] 04/17/19 1042      Row Name 04/17/19 1008             Stand-Sit Transfer    Stand-Sit [place] (Transfers)  verbal cues;contact guard  -KJ      Assistive Device (Stand-Sit Transfers)  walker, front-wheeled  -KJ      Recorded by [KJ] Gaby Bennett, [facility] 04/17/19 1042      Row Name 04/17/19 1008             Gait/Stairs Assessment/Training    Gait/Stairs Assessment/Training  gait/ambulation independence  -KJ      [place] Level (Gait)  verbal cues;contact guard  -KJ      Assistive Device (Gait)  walker, front-wheeled assitance with walker for placment  -KJ      Distance in Feet (Gait)  30' x 2  -KJ      Pattern (Gait)  step-through  -KJ      Deviations/Abnormal Patterns (Gait)  sukhdeep decreased;stride length decreased;gait speed decreased;base of support, narrow;bilateral deviations  -KJ      Bilateral Gait Deviations  forward flexed posture;heel strike decreased  -KJ      Comment (Gait/Stairs)  decreased foot clearance due to weakness.   -KJ      Recorded by [KJ] Gaby Bennett [facility] 04/17/19 1042      Row Name 04/17/19 1008             Motor Skills Assessment/Interventions    Additional Documentation  Therapeutic Exercise (Group)  -KJ      Recorded by [KJ] Gaby Bennett [facility] 04/17/19 1042      Row Name 04/17/19 1008             Therapeutic Exercise    Exercise Type (Therapeutic Exercise)  AROM (active range of motion);AAROM (active assistive range of motion)  -KJ      Position (Therapeutic Exercise)  supine  -KJ      Sets/Reps (Therapeutic Exercise)  10  -KJ      Recorded by [KJ] Gaby Bennett [facility] 04/17/19 1042      Row Name 04/17/19 1008             Positioning and Restraints    Pre-Treatment Position  in bed  -KJ      Post Treatment Position  bed  -KJ      Recorded by [KJ] Gaby Bennett, [facility] 04/17/19 1042      Row Name 04/17/19 1008             Pain Scale: Numbers Pre/Post-Treatment    Pain Scale: Numbers,  Pretreatment  4/10  -KJ      Pain Scale: Numbers, Post-Treatment  4/10  -KJ      Pain Location  flank left  -KJ      Recorded by [KJ] Gaby Bennett, PTA 04/17/19 1042      Row Name                Wound 04/15/19 1434 Right groin incision    Wound - Properties Group Date first assessed: 04/15/19 [AC] Time first assessed: 1434 [AC] Side: Right [AC] Location: groin [AC] Type: incision [AC] Recorded by:  [AC] Iliana Montoya RN 04/15/19 1434      User Key  (r) = Recorded By, (t) = Taken By, (c) = Cosigned By    Initials Name Effective Dates Discipline    Gaby Jordan PTA 08/02/16 -  PT    Iliana Ferreira RN 08/02/16 -  Nurse          Wound 04/15/19 1434 Right groin incision (Active)   Dressing Appearance dry;intact;no drainage;open to air 4/16/2019  7:15 PM   Drainage Amount none 4/16/2019  7:15 PM           Physical Therapy Education     Title: PT OT SLP Therapies (Done)     Topic: Physical Therapy (Done)     Point: Mobility training (Done)     Learning Progress Summary           Patient Acceptance, E, VU by  at 4/16/2019  2:51 PM    Comment:  pt educated on POC, benefits of activity and d/c plans                   Point: Home exercise program (Done)     Learning Progress Summary           Patient Acceptance, E, VU by SB at 4/16/2019  2:51 PM    Comment:  pt educated on POC, benefits of activity and d/c plans                   Point: Body mechanics (Done)     Learning Progress Summary           Patient Acceptance, E, VU by SB at 4/16/2019  2:51 PM    Comment:  pt educated on POC, benefits of activity and d/c plans                   Point: Precautions (Done)     Learning Progress Summary           Patient Acceptance, E, VU by SB at 4/16/2019  2:51 PM    Comment:  pt educated on POC, benefits of activity and d/c plans                               User Key     Initials Effective Dates Name Provider Type Discipline     08/31/18 -  Kasey Brunson, PT Physical Therapist PT                PT  Recommendation and Plan     Plan of Care Reviewed With: patient  Progress: improving  Outcome Summary: PT tx completed. Pt supine in bed. C/O left flank pn, rates 4/10. S sup>sit, ModA with BLE's sit>sup. Sit<>stand Marquita. Amb 30' x 2 with r wx CG. Marquita with walker placement during walking. Pt has decreased tolerance to activity. BLE's weak and edematous. A-AAROM BLE's x 15. Recommend cont'd PT/OT for strengthening. Pt wants to go home, but son wants her to go to rehab. Would require 24 hour care and assitance with all ADL's.   Outcome Measures     Row Name 04/16/19 1500 04/16/19 1400          How much help from another person do you currently need...    Turning from your back to your side while in flat bed without using bedrails?  --  3  -SB     Moving from lying on back to sitting on the side of a flat bed without bedrails?  --  3  -SB     Moving to and from a bed to a chair (including a wheelchair)?  --  3  -SB     Standing up from a chair using your arms (e.g., wheelchair, bedside chair)?  --  3  -SB     Climbing 3-5 steps with a railing?  --  1  -SB     To walk in hospital room?  --  3  -SB     AM-PAC 6 Clicks Score  --  16  -SB        How much help from another is currently needed...    Putting on and taking off regular lower body clothing?  1  -CH  --     Bathing (including washing, rinsing, and drying)  2  -CH  --     Toileting (which includes using toilet bed pan or urinal)  2  -CH  --     Putting on and taking off regular upper body clothing  3  -CH  --     Taking care of personal grooming (such as brushing teeth)  4  -CH  --     Eating meals  4  -CH  --     Score  16  -CH  --        Functional Assessment    Outcome Measure Options  AM-PAC 6 Clicks Daily Activity (OT)  -CH  AM-PAC 6 Clicks Basic Mobility (PT)  -SB       User Key  (r) = Recorded By, (t) = Taken By, (c) = Cosigned By    Initials Name Provider Type    CH Renetta Garrison, OTR/L Occupational Therapist    Kasey Montenegro PT Physical Therapist          Time Calculation:   PT Charges     Row Name 19 1042             Time Calculation    Start Time  1008  -KJ      Stop Time  1037  -KJ      Time Calculation (min)  29 min  -KJ      PT Received On  19  -KJ      PT Goal Re-Cert Due Date  19  -KJ         Time Calculation- PT    Total Timed Code Minutes- PT  29 minute(s)  -KJ        User Key  (r) = Recorded By, (t) = Taken By, (c) = Cosigned By    Initials Name Provider Type    Gaby Jordan PTA Physical Therapy Assistant        Therapy Charges for Today     Code Description Service Date Service Provider Modifiers Qty    47989426399 HC GAIT TRAINING EA 15 MIN 2019 Gaby Bennett, VERONICA GP 1    52453090790 HC PT THER PROC EA 15 MIN 2019 Gaby Bennett, VERONICA GP 1          PT G-Codes  Outcome Measure Options: AM-PAC 6 Clicks Daily Activity (OT)  AM-PAC 6 Clicks Score: 16  Score: 16    Gaby Bennett PTA  2019         Electronically signed by Gaby Bennett PTA at 2019 10:46 AM          Occupational Therapy Notes (most recent note)      Renetta Garrison, OTR/L at 2019  3:23 PM          Acute Care - Occupational Therapy Initial Evaluation  [facility]     Patient Name: Daniel Oliver  : 1950  MRN: [id number]  Today's Date: 2019  Onset of Illness/Injury or Date of Surgery: 19  Date of Referral to OT: 04/15/19  Referring Physician: DEISI Edwards    Admit Date: 2019       ICD-10-CM ICD-9-CM   1. Chest pain, unspecified type R07.9 786.50   2. Splenic artery aneurysm (CMS/HCC) I72.8 442.83   3. Impaired functional mobility, balance, gait, and endurance Z74.09 V49.89     Patient Active Problem List   Diagnosis   • Chest pain   • Left upper quadrant pain   • Splenic artery aneurysm (CMS/HCC)   • Splenomegaly   • Elevated troponin   • Liver cirrhosis (CMS/HCC)   • Diabetes mellitus (CMS/HCC)   • Disease of thyroid gland   • Normocytic anemia   • Pancytopenia (CMS/HCC)     Past Medical History:   Diagnosis  Date   • Diabetes mellitus (CMS/HCC)    • Disease of thyroid gland    • Hypertension    • Liver cirrhosis (CMS/HCC)      Past Surgical History:   Procedure Laterality Date   • ARTERIOGRAM MESENTERIC Right 4/15/2019    Procedure: DIAGNOSTIC ARTERIOGRAM MESENTERIC WITH STENT PLACEMENT;  Surgeon: Joel Orosco DO;  Location: [facility] OR ;  Service: Vascular   • HIP ARTHROPLASTY            OT ASSESSMENT FLOWSHEET (last 12 hours)      Occupational Therapy Evaluation     Row Name 04/16/19 1315                   OT Evaluation Time/Intention    Subjective Information  complains of;pain  -        Document Type  evaluation  -        Mode of Treatment  occupational therapy  -           General Information    Patient Profile Reviewed?  yes  -CH        Onset of Illness/Injury or Date of Surgery  04/13/19  -        Referring Physician  DEISI Mai  -        Patient Observations  alert;cooperative;agree to therapy  -        Patient/Family Observations  son present  -        General Observations of Patient  fowlers, bilat SCDs  -        Prior Level of Function  min assist:;transfer;bed mobility;mod assist:;dressing S for toileting  -        Equipment Currently Used at Home  shower chair;walker, rolling;commode, 3-in-1;wheelchair  -        Pertinent History of Current Functional Problem  Dx: Splenic artery aneurysm , Splenomegaly, Elevated troponin, underwent stent to splenic a. via R groin performed by Dr. Orosco  -        Existing Precautions/Restrictions  fall  -        Risks Reviewed  patient and family:;increased discomfort;LOB  -        Benefits Reviewed  patient and family:;improve function;increase independence;increase strength;increase balance  -        Barriers to Rehab  medically complex;previous functional deficit  -CH           Relationship/Environment    Primary Source of Support/Comfort  child(shorty)  -        Lives With  spouse  -           Resource/Environmental  Concerns    Current Living Arrangements  home/apartment/condo  -           Home Main Entrance    Number of Stairs, Main Entrance  three  -        Stair Railings, Main Entrance  railing on right side (ascending)  -           Cognitive Assessment/Intervention- PT/OT    Affect/Mental Status (Cognitive)  confused;flat/blunted affect  -        Orientation Status (Cognition)  oriented to;place;person  -        Follows Commands (Cognition)  follows one step commands;75-90% accuracy  -        Cognitive Function (Cognitive)  safety deficit  -        Safety Deficit (Cognitive)  at risk behavior observed;problem solving;judgment;insight into deficits/self awareness  -        Personal Safety Interventions  fall prevention program maintained;gait belt;muscle strengthening facilitated;nonskid shoes/slippers when out of bed;supervised activity  -           Safety Issues, Functional Mobility    Safety Issues Affecting Function (Mobility)  awareness of need for assistance;insight into deficits/self awareness;judgment;positioning of assistive device  -        Impairments Affecting Function (Mobility)  balance;endurance/activity tolerance;postural/trunk control;strength  -           Bed Mobility Assessment/Treatment    Bed Mobility Assessment/Treatment  supine-sit;sit-supine  -        Supine-Sit [place] (Bed Mobility)  contact guard  -        Sit-Supine [place] (Bed Mobility)  moderate assist (50% patient effort);verbal cues  -        Bed Mobility, Safety Issues  decreased use of legs for bridging/pushing  -        Assistive Device (Bed Mobility)  head of bed elevated;bed rails  -           Functional Mobility    Functional Mobility- Ind. Level  contact guard assist;minimum assist (75% patient effort)  -        Functional Mobility- Device  rolling walker  -        Functional Mobility- Safety Issues  weight-shifting ability decreased;step length decreased;balance decreased during  turns;loses balance backward  -        Functional Mobility- Comment  in room to hallway  -           Transfer Assessment/Treatment    Transfer Assessment/Treatment  sit-stand transfer;stand-sit transfer  -           Sit-Stand Transfer    Sit-Stand [place] (Transfers)  minimum assist (75% patient effort)  -        Assistive Device (Sit-Stand Transfers)  walker, front-wheeled  -           Stand-Sit Transfer    Stand-Sit [place] (Transfers)  minimum assist (75% patient effort)  -        Assistive Device (Stand-Sit Transfers)  walker, front-wheeled  -           ADL Assessment/Intervention    BADL Assessment/Intervention  lower body dressing  -           Lower Body Dressing Assessment/Training    Lower Body Dressing Sanders Level  dependent (less than 25% patient effort);don;socks  -        Lower Body Dressing Position  supine  -           BADL Safety/Performance    Impairments, BADL Safety/Performance  balance;cognition;endurance/activity tolerance;pain;strength;trunk/postural control  -           General ROM    GENERAL ROM COMMENTS  BUE AROM WFL  -           MMT (Manual Muscle Testing)    General MMT Comments  BUEs 4-/5  -           Motor Assessment/Interventions    Additional Documentation  Balance (Group)  -           Balance    Balance  static sitting balance  -           Static Sitting Balance    Level of [place] (Unsupported Sitting, Static Balance)  contact guard assist;minimal assist, 75% patient effort  -           Positioning and Restraints    Pre-Treatment Position  in bed  -        Post Treatment Position  bed  -        In Bed  fowlers;call light within reach;encouraged to call for assist;with family/caregiver;side rails up x2;legs elevated;R heel elevated;L heel elevated;SCD pump applied  -           Pain Assessment    Additional Documentation  Pain Scale: Numbers Pre/Post-Treatment (Group)  -           Pain Scale: Numbers Pre/Post-Treatment     Pain Scale: Numbers, Pretreatment  2/10  -CH        Pain Scale: Numbers, Post-Treatment  2/10  -CH        Pain Location  abdomen  -CH           Wound 04/15/19 1434 Right groin incision    Wound - Properties Group Date first assessed: 04/15/19  -AC Time first assessed: 1434  -AC Side: Right  -AC Location: groin  -AC Type: incision  -AC       Plan of Care Review    Plan of Care Reviewed With  patient;son  -           Clinical Impression (OT)    Date of Referral to OT  04/15/19  -        OT Diagnosis  decline in ADLs  -        Criteria for Skilled Therapeutic Interventions Met (OT Eval)  yes;no problems identified which require skilled intervention;treatment indicated  -        Rehab Potential (OT Eval)  fair, will monitor progress closely  -        Therapy Frequency (OT Eval)  5 times/wk  -        Care Plan Review (OT)  evaluation/treatment results reviewed;care plan/treatment goals reviewed;risks/benefits reviewed;current/potential barriers reviewed;patient/other agree to care plan  -        Care Plan Review, Other Participant (OT Eval)  son  -        Anticipated Discharge Disposition (OT)  home with assist;home with home health;home with OP services;skilled nursing facility  -           Planned OT Interventions    Planned Therapy Interventions (OT Eval)  activity tolerance training;functional balance retraining;patient/caregiver education/training;strengthening exercise;transfer/mobility retraining;BADL retraining;occupation/activity based interventions  -           OT Goals    Transfer Goal Selection (OT)  transfer, OT goal 1  -        Toileting Goal Selection (OT)  toileting, OT goal 1  -           Transfer Goal 1 (OT)    Activity/Assistive Device (Transfer Goal 1, OT)  sit-to-stand/stand-to-sit;bed-to-chair/chair-to-bed;commode, 3-in-1;walker, rolling  -        [place] Level/Cues Needed (Transfer Goal 1, OT)  supervision required;verbal cues required  -        Time Frame (Transfer  Goal 1, OT)  long term goal (LTG);by discharge  -        Barriers (Transfers Goal 1, OT)  .  -CH        Progress/Outcome (Transfer Goal 1, OT)  goal ongoing  -           Toileting Goal 1 (OT)    Activity/Device (Toileting Goal 1, OT)  toileting skills, all;adjust/manage clothing;perform perineal hygiene;commode, 3-in-1  -CH        [place] Level/Cues Needed (Toileting Goal 1, OT)  minimum assist (75% or more patient effort);verbal cues required  -        Time Frame (Toileting Goal 1, OT)  long term goal (LTG);by discharge  -        Barriers (Toileting Goal 1, OT)  .  -        Progress/Outcome (Toileting Goal 1, OT)  goal ongoing  -           Living Environment    Home Accessibility  stairs to enter home walk in shower  -          User Key  (r) = Recorded By, (t) = Taken By, (c) = Cosigned By    Initials Name Effective Dates     Renetta Garrison, OTR/L 08/02/16 -     Iliana Ferreira RN 08/02/16 -                OT Recommendation and Plan  Outcome Summary/Treatment Plan (OT)  Anticipated Discharge Disposition (OT): home with assist, home with home health, home with OP services, skilled nursing facility  Planned Therapy Interventions (OT Eval): activity tolerance training, functional balance retraining, patient/caregiver education/training, strengthening exercise, transfer/mobility retraining, BADL retraining, occupation/activity based interventions  Therapy Frequency (OT Eval): 5 times/wk  Plan of Care Review  Plan of Care Reviewed With: patient  Plan of Care Reviewed With: patient  Outcome Summary: OT eval completed.  Pt. completed bed mobility at [facility], required Min A for t/fs & ambulation & was dep in LBD.  She has had a decline in ADL but son is extremely supportive & eager to assist.  Anticipate DC home with HH vs. outpatient.      Outcome Measures     Row Name 04/16/19 1500 04/16/19 1400          How much help from another person do you currently need...    Turning from your back to  your side while in flat bed without using bedrails?  --  3  -SB     Moving from lying on back to sitting on the side of a flat bed without bedrails?  --  3  -SB     Moving to and from a bed to a chair (including a wheelchair)?  --  3  -SB     Standing up from a chair using your arms (e.g., wheelchair, bedside chair)?  --  3  -SB     Climbing 3-5 steps with a railing?  --  1  -SB     To walk in hospital room?  --  3  -SB     AM-PAC 6 Clicks Score  --  16  -SB        How much help from another is currently needed...    Putting on and taking off regular lower body clothing?  1  -CH  --     Bathing (including washing, rinsing, and drying)  2  -CH  --     Toileting (which includes using toilet bed pan or urinal)  2  -CH  --     Putting on and taking off regular upper body clothing  3  -CH  --     Taking care of personal grooming (such as brushing teeth)  4  -CH  --     Eating meals  4  -CH  --     Score  16  -CH  --        Functional Assessment    Outcome Measure Options  AM-PAC 6 Clicks Daily Activity (OT)  -  AM-PAC 6 Clicks Basic Mobility (PT)  -SB       User Key  (r) = Recorded By, (t) = Taken By, (c) = Cosigned By    Initials Name Provider Type     Renetta Garrison, OTR/L Occupational Therapist    SB Kasey Brunson, PT Physical Therapist          Time Calculation:   Time Calculation- OT     Row Name 04/16/19 1315             Time Calculation- OT    OT Start Time  1315 add 20 from previous day  -      OT Stop Time  1422  -      OT Time Calculation (min)  67 min  -      Total Timed Code Minutes- OT  27 minute(s)  -      OT Received On  04/16/19  -      OT Goal Re-Cert Due Date  04/26/19  -        User Key  (r) = Recorded By, (t) = Taken By, (c) = Cosigned By    Initials Name Provider Type     Renetta Garrison, OTR/L Occupational Therapist        Therapy Charges for Today     Code Description Service Date Service Provider Modifiers Qty    58134686911  OT SELF CARE/MGMT/TRAIN EA 15 MIN 4/16/2019 Hunt,  Renetta MARTIN OTR/L GO 2    [id number]  OT EVAL MOD COMPLEXITY 4 4/16/2019 Renetta Garrison OTR/L GO 1               THALIA Wong/L  4/16/2019    Electronically signed by Renetta Garrison OTR/L at 4/16/2019  3:24 PM         Discharge Summary     No notes of this type exist for this encounter.

## 2019-04-17 NOTE — PLAN OF CARE
Problem: Patient Care Overview  Goal: Plan of Care Review  Outcome: Ongoing (interventions implemented as appropriate)   04/17/19 1043   Coping/Psychosocial   Plan of Care Reviewed With patient   Plan of Care Review   Progress improving   OTHER   Outcome Summary PT tx completed. Pt supine in bed. C/O left flank pn, rates 4/10. S sup>sit, ModA with BLE's sit>sup. Sit<>stand Marquita. Amb 30' x 2 with r wx CG. Marquita with walker placement during walking. Pt has decreased tolerance to activity. BLE's weak and edematous. A-AAROM BLE's x 15. Recommend cont'd PT/OT for strengthening. Pt wants to go home, but son wants her to go to rehab. Would require 24 hour care and assitance with all ADL's.

## 2019-04-17 NOTE — PLAN OF CARE
Problem: Patient Care Overview  Goal: Plan of Care Review  Outcome: Ongoing (interventions implemented as appropriate)   04/17/19 0232   Coping/Psychosocial   Plan of Care Reviewed With patient;spouse   Plan of Care Review   Progress improving   OTHER   Outcome Summary VSS. Work with PT/OT, able to ambulate with walker in room. Aldactone started. Lidoderm dc'd. LINDA lira JACOB. Continue to monitor.     Goal: Individualization and Mutuality  Outcome: Ongoing (interventions implemented as appropriate)   04/16/19 0321   Individualization   Patient Specific Goals (Include Timeframe) No falls   Patient Specific Interventions Safety precautions       Problem: Fall Risk (Adult)  Goal: Absence of Fall  Outcome: Ongoing (interventions implemented as appropriate)   04/17/19 0232   Fall Risk (Adult)   Absence of Fall achieves outcome       Problem: Cardiac: ACS (Acute Coronary Syndrome) (Adult)  Goal: Signs and Symptoms of Listed Potential Problems Will be Absent, Minimized or Managed (Cardiac: ACS)  Outcome: Ongoing (interventions implemented as appropriate)   04/17/19 0232   Goal/Outcome Evaluation   Problems Assessed (Acute Coronary Syndrome) all   Problems Present (Acute Coronary Syn) situational response       Problem: Skin Injury Risk (Adult)  Goal: Skin Health and Integrity  Outcome: Ongoing (interventions implemented as appropriate)   04/17/19 0232   Skin Injury Risk (Adult)   Skin Health and Integrity making progress toward outcome

## 2019-04-17 NOTE — THERAPY TREATMENT NOTE
Acute Care - Physical Therapy Treatment Note  [facility]     Patient Name: Daniel Oliver  : 1950  MRN: [id number]  Today's Date: 2019  Onset of Illness/Injury or Date of Surgery: 19  Date of Referral to PT: 19  Referring Physician: DEISI Edwards    Admit Date: 2019    Visit Dx:    ICD-10-CM ICD-9-CM   1. Chest pain, unspecified type R07.9 786.50   2. Splenic artery aneurysm (CMS/HCC) I72.8 442.83   3. Impaired functional mobility, balance, gait, and endurance Z74.09 V49.89     Patient Active Problem List   Diagnosis   • Chest pain   • Left upper quadrant pain   • Splenic artery aneurysm (CMS/HCC)   • Splenomegaly   • Elevated troponin   • Liver cirrhosis (CMS/HCC)   • Diabetes mellitus (CMS/HCC)   • Disease of thyroid gland   • Normocytic anemia   • Pancytopenia (CMS/HCC)       Therapy Treatment    Rehabilitation Treatment Summary     Row Name 19 1008             Treatment Time/Intention    Discipline  physical therapy assistant  -KJ      Document Type  therapy note (daily note)  -KJ      Subjective Information  complains of;weakness  -KJ      Mode of Treatment  physical therapy  -KJ      Patient Effort  good  -KJ      Existing Precautions/Restrictions  fall  -KJ      Treatment Considerations/Comments  legs very edematous; limited foot clearance during gait  -KJ      Recorded by [KJ] Gaby Bennett, VERONICA 19 1042      Row Name 19 1008             Bed Mobility Assessment/Treatment    Supine-Sit [place] (Bed Mobility)  verbal cues;supervision  -KJ      Sit-Supine [place] (Bed Mobility)  verbal cues;moderate assist (50% patient effort)  -KJ      Bed Mobility, Safety Issues  decreased use of legs for bridging/pushing  -KJ      Comment (Bed Mobility)  assitance with BLE to get up into bed  -KJ      Recorded by [KJ] Gaby Bennett, VERONICA 19 1042      Row Name 19 1008             Sit-Stand Transfer    Sit-Stand [place] (Transfers)  verbal cues;minimum  assist (75% patient effort)  -KJ      Assistive Device (Sit-Stand Transfers)  walker, front-wheeled  -KJ      Recorded by [KJ] Gaby Bennett [facility] 04/17/19 1042      Row Name 04/17/19 1008             Stand-Sit Transfer    Stand-Sit [place] (Transfers)  verbal cues;contact guard  -KJ      Assistive Device (Stand-Sit Transfers)  walker, front-wheeled  -KJ      Recorded by [KJ] Gaby Bennett [facility] 04/17/19 1042      Row Name 04/17/19 1008             Gait/Stairs Assessment/Training    Gait/Stairs Assessment/Training  gait/ambulation independence  -KJ      [place] Level (Gait)  verbal cues;contact guard  -KJ      Assistive Device (Gait)  walker, front-wheeled assitance with walker for placment  -KJ      Distance in Feet (Gait)  30' x 2  -KJ      Pattern (Gait)  step-through  -KJ      Deviations/Abnormal Patterns (Gait)  sukhdeep decreased;stride length decreased;gait speed decreased;base of support, narrow;bilateral deviations  -KJ      Bilateral Gait Deviations  forward flexed posture;heel strike decreased  -KJ      Comment (Gait/Stairs)  decreased foot clearance due to weakness.   -KJ      Recorded by [KJ] Gaby Bennett PTA 04/17/19 1042      Row Name 04/17/19 1008             Motor Skills Assessment/Interventions    Additional Documentation  Therapeutic Exercise (Group)  -KJ      Recorded by [KJ] Gaby Bennett [facility] 04/17/19 1042      Row Name 04/17/19 1008             Therapeutic Exercise    Exercise Type (Therapeutic Exercise)  AROM (active range of motion);AAROM (active assistive range of motion)  -KJ      Position (Therapeutic Exercise)  supine  -KJ      Sets/Reps (Therapeutic Exercise)  10  -KJ      Recorded by [KJ] Gaby Bennett [facility] 04/17/19 1042      Row Name 04/17/19 1008             Positioning and Restraints    Pre-Treatment Position  in bed  -KJ      Post Treatment Position  bed  -KJ      Recorded by [KJ] Gaby Bennett [facility] 04/17/19 1042      Row Name 04/17/19 1008             Pain  Scale: Numbers Pre/Post-Treatment    Pain Scale: Numbers, Pretreatment  4/10  -KJ      Pain Scale: Numbers, Post-Treatment  4/10  -KJ      Pain Location  flank left  -KJ      Recorded by [KJ] Gaby Bennett PTA 04/17/19 1042      Row Name                Wound 04/15/19 1434 Right groin incision    Wound - Properties Group Date first assessed: 04/15/19 [AC] Time first assessed: 1434 [AC] Side: Right [AC] Location: groin [AC] Type: incision [AC] Recorded by:  [AC] Iliana Montoya RN 04/15/19 1434      User Key  (r) = Recorded By, (t) = Taken By, (c) = Cosigned By    Initials Name Effective Dates Discipline    Gaby Jordan PTA 08/02/16 -  PT    Iliana Ferreira RN 08/02/16 -  Nurse          Wound 04/15/19 1434 Right groin incision (Active)   Dressing Appearance dry;intact;no drainage;open to air 4/16/2019  7:15 PM   Drainage Amount none 4/16/2019  7:15 PM           Physical Therapy Education     Title: PT OT SLP Therapies (Done)     Topic: Physical Therapy (Done)     Point: Mobility training (Done)     Learning Progress Summary           Patient Acceptance, E, VU by SB at 4/16/2019  2:51 PM    Comment:  pt educated on POC, benefits of activity and d/c plans                   Point: Home exercise program (Done)     Learning Progress Summary           Patient Acceptance, E, VU by SB at 4/16/2019  2:51 PM    Comment:  pt educated on POC, benefits of activity and d/c plans                   Point: Body mechanics (Done)     Learning Progress Summary           Patient Acceptance, E, VU by SB at 4/16/2019  2:51 PM    Comment:  pt educated on POC, benefits of activity and d/c plans                   Point: Precautions (Done)     Learning Progress Summary           Patient Acceptance, E, VU by SB at 4/16/2019  2:51 PM    Comment:  pt educated on POC, benefits of activity and d/c plans                               User Key     Initials Effective Dates Name Provider Type Discipline     08/31/18 -  Nannette  Kasey, PT Physical Therapist PT                PT Recommendation and Plan     Plan of Care Reviewed With: patient  Progress: improving  Outcome Summary: PT tx completed. Pt supine in bed. C/O left flank pn, rates 4/10. S sup>sit, ModA with BLE's sit>sup. Sit<>stand Marquita. Amb 30' x 2 with r wx CG. [place] with walker placement during walking. Pt has decreased tolerance to activity. BLE's weak and edematous. A-AAROM BLE's x 15. Recommend cont'd PT/OT for strengthening. Pt wants to go home, but son wants her to go to rehab. Would require 24 hour care and assitance with all ADL's.   Outcome Measures     Row Name 04/16/19 1500 04/16/19 1400          How much help from another person do you currently need...    Turning from your back to your side while in flat bed without using bedrails?  --  3  -SB     Moving from lying on back to sitting on the side of a flat bed without bedrails?  --  3  -SB     Moving to and from a bed to a chair (including a wheelchair)?  --  3  -SB     Standing up from a chair using your arms (e.g., wheelchair, bedside chair)?  --  3  -SB     Climbing 3-5 steps with a railing?  --  1  -SB     To walk in hospital room?  --  3  -SB     AM-PAC 6 Clicks Score  --  16  -SB        How much help from another is currently needed...    Putting on and taking off regular lower body clothing?  1  -CH  --     Bathing (including washing, rinsing, and drying)  2  -CH  --     Toileting (which includes using toilet bed pan or urinal)  2  -CH  --     Putting on and taking off regular upper body clothing  3  -CH  --     Taking care of personal grooming (such as brushing teeth)  4  -CH  --     Eating meals  4  -CH  --     Score  16  -CH  --        Functional Assessment    Outcome Measure Options  AM-PAC 6 Clicks Daily Activity (OT)  -CH  AM-PAC 6 Clicks Basic Mobility (PT)  -SB       User Key  (r) = Recorded By, (t) = Taken By, (c) = Cosigned By    Initials Name Provider Type    Renetta Carbajal, OTR/L Occupational  Therapist    SB Kasey Brunson, PT Physical Therapist         Time Calculation:   PT Charges     Row Name 04/17/19 1042             Time Calculation    Start Time  1008  -KJ      Stop Time  1037  -KJ      Time Calculation (min)  29 min  -KJ      PT Received On  04/17/19  -KJ      PT Goal Re-Cert Due Date  04/26/19  -KJ         Time Calculation- PT    Total Timed Code Minutes- PT  29 minute(s)  -KJ        User Key  (r) = Recorded By, (t) = Taken By, (c) = Cosigned By    Initials Name Provider Type    Gaby Jordan, VERONICA Physical Therapy Assistant        Therapy Charges for Today     Code Description Service Date Service Provider Modifiers Qty    98249120969 HC GAIT TRAINING EA 15 MIN 4/17/2019 Gaby Bennett, VERONICA GP 1    82293623007 HC PT THER PROC EA 15 MIN 4/17/2019 Gaby Bennett, VERONICA GP 1          PT G-Codes  Outcome Measure Options: AM-PAC 6 Clicks Daily Activity (OT)  AM-PAC 6 Clicks Score: 16  Score: 16    Gaby Bennett PTA  4/17/2019

## 2019-04-17 NOTE — DISCHARGE PLACEMENT REQUEST
"From:  Dayana May, JAME  [phone]      Jaylin Oliver (68 y.o. Female)     Date of Birth Social Security Number Address Home Phone MRN    1950  [address] [place] [address] [phone] [id number]    Yazdanism Marital Status          Other        Admission Date Admission Type Admitting Provider Attending Provider Department, Room/Bed    4/13/19 Emergency Brad Perry MD Truong, Khai C, MD [address], 410/1    Discharge Date Discharge Disposition Discharge Destination         Home or Self Care              Attending Provider:  Brad Perry MD    Allergies:  No Known Allergies    Isolation:  None   Infection:  None   Code Status:  CPR    Ht:  167.6 cm (65.98\")   Wt:  87.3 kg (192 lb 7 oz)    Admission Cmt:  None   Principal Problem:  Left upper quadrant pain [R10.12]                 Active Insurance as of 4/13/2019     Primary Coverage     Payor Plan Insurance Group Employer/Plan Group    MEDICARE MEDICARE A & B      Payor Plan Address Payor Plan Phone Number Payor Plan Fax Number Effective Dates    [address] [phone]  6/1/2015 - None Entered    [place] [id number]       Subscriber Name Subscriber Birth Date Member ID       JAYLIN OLIVER 1950 [id number]                 Emergency Contacts      (Rel.) Home Phone Work Phone Mobile Phone    ian wesley (Sister) -- -- [phone]    Joseph Oliver (Son) -- -- [phone]        Referral to Home Health [REF34] (Order [id number])   Order   Date: 4/17/2019 Department: [address] Ordering/Authorizing: Luz Diaz APRN   Lab and Collection     Referral to Home Health (Order: [id number]) - 4/17/2019   Order History   Outpatient   Date/Time Action Taken User Additional Information   04/17/19 1329 Sign Luz Diaz APRN    Order Details     Frequency Duration Priority Order Class   None None Routine Internal Referral   Start Date/Time     Start Date   04/17/19 "   Order Questions     Question Answer Comment   Face to Face Visit Date 4/17/2019    Follow-up Provider for Plan of Care? I treated the patient in an acute care facility and will not continue treatment after discharge.    Follow-up Provider DINA MONROE    Reason/Clinical Findings general debility. cirrhosis    Describe mobility limitations that make leaving home difficult as above    Nursing/Therapeutic Services Requested Skilled Nursing     Physical Therapy     Occupational Therapy     Speech Therapy     Medical / Social Work    Skilled nursing orders: Cardiopulmonary assessments cbc, bmp in 1 week with results to Dr. Monroe    Neurovascular assessments     Medication education    PT orders: Therapeutic exercise     Gait Training     Strengthening    Weight Bearing Status As Tolerated    Occupational orders: Activities of daily living pt needs bath aid as well.     Home safety assessment     Energy conservation     Strengthening    Social work orders: Community resources    Frequency: 1 Week 1           Source Order Set / Preference List     Order Set    IP GEN EXPRESS DISCHARGE [[id number]]   Clinical Indications      ICD-10-CM ICD-9-CM   Chest pain, unspecified type  - Primary     R07.9 786.50   Splenic artery aneurysm (CMS/HCC)     I72.8 442.83   Impaired functional mobility, balance, gait, and endurance     Z74.09 V49.89   Reprint Order Requisition     AMB REFERRAL TO HOME HEALTH (Order #[id number]) on 4/17/19   Encounter-Level Cardiology Documents:     There are no encounter-level cardiology documents.   Encounter     View Encounter       Order Provider Info         Office phone Pager E-mail   Ordering User Luz Diaz APRN [phone] -- --   Authorizing Provider Luz Diaz APRN [phone] -- --   Attending Provider Brad Perry [phone] -- --   Linked Charges     No charges linked to this procedure   Order Transmittal Tracking     AMB REFERRAL TO HOME  HEALTH (Order #[id number]) on 4/17/19   Authorized by:  DEISI Joseph  (NPI: [id number])       Lab Component SmartPhrase Guide     AMB REFERRAL TO HOME HEALTH (Order #[id number]) on 4/17/19            History & Physical      Jhon Linda MD at 4/13/2019 10:10 PM              [facility] Medicine Services  HISTORY AND PHYSICAL    Date of Admission: 4/13/2019  Primary Care Physician: See Fritz DO    Subjective     Chief Complaint: Chest pain    History of Present Illness  Patient is 68-year-old -American female with past medical history significant for diabetes as well as cirrhosis undetermined etiology.  She complains of a 5-day history of left-sided chest wall pain and probably more left upper quadrant abdominal pain.  This started at rest and his continued in intensity and is slightly worse on Saturday when she presented to an outside facility.  At that time she had an elevated troponin other scale of 0.134 she was transferred here  And found to have a troponin of 0.023.  She also had a CT scan of the chest performed which shows no evidence of a PE bibasilar atelectasis no pneumonia patient has a questionable right breast mass however on physical exam I am unable to palpate anything.  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  She has been transferred here for further evaluation of chest pain I do not believe the ER doctor had a final report regarding his splenic artery aneurysm at the time he called me.  Patient also has cholelithiasis on CT scan.  She is tender to palpation up there and essentially it hurts to take a deep breath and has pain with range of motion.  She is very tender really up in the left upper abdominal and left chest wall area lower chest wall.  She denies cough fever congestion change in bowels change in appetite nausea  vomiting hematemesis or melena.  She was given a dose of Lovenox and the outlying emergency room.  Will hold anticoagulants.      Review of Systems   14 point review of systems negative except for as per HPI    Otherwise complete ROS reviewed and negative except as mentioned in the HPI.    Past Medical History:   Past Medical History:   Diagnosis Date   • Diabetes mellitus (CMS/HCC)    • Disease of thyroid gland    • Hypertension    • Liver cirrhosis (CMS/HCC)      Past Surgical History:  Past Surgical History:   Procedure Laterality Date   • HIP ARTHROPLASTY       Social History:  reports that she has never smoked. She does not have any smokeless tobacco history on file. She reports that she does not use drugs.    Family History:   No history of cirrhosis    Allergies:  No Known Allergies  Medications:  Prior to Admission medications    Medication Sig Start Date End Date Taking? Authorizing Provider   CloNIDine (CATAPRES) 0.1 MG tablet Take 0.1 mg by mouth 1 (One) Time.   Yes Yudelka Donald MD   furosemide (LASIX) 40 MG tablet Take 40 mg by mouth 2 (Two) Times a Day.   Yes Yudelka Donald MD   insulin aspart (novoLOG) 100 UNIT/ML injection Inject  under the skin into the appropriate area as directed 3 (Three) Times a Day Before Meals.   Yes Yudelka Donald MD   insulin glargine (LANTUS) 100 UNIT/ML injection Inject 10 Units under the skin into the appropriate area as directed 2 (Two) Times a Day.   Yes Yudelka Donald MD   lactulose (CHRONULAC) 10 GM/15ML solution Take 20 g by mouth 2 (Two) Times a Day As Needed.   Yes Yudelka Donald MD   levothyroxine (SYNTHROID, LEVOTHROID) 75 MCG tablet Take 75 mcg by mouth Daily.   Yes Yudelka Donald MD   POTASSIUM CHLORIDE ER PO Take  by mouth.   Yes Yudelka Donald MD     Objective     Vital Signs: /66 (BP Location: Right arm, Patient Position: Lying)   Pulse 81   Temp 97.9 °F (36.6 °C) (Oral)   Resp 18   Ht 167.6 cm  "(65.98\")   Wt 83.6 kg (184 lb 5 oz)   SpO2 99%   BMI 29.76 kg/m²    Physical Exam  Gen. well-nourished well-developed -American female with vitiligo in no acute distress  HEENT: Atraumatic normocephalic pupils equal round reactive to light extraocular movements intact sclerae anicteric TMs negative mucous membranes moist neck is supple without lymphadenopathy no JVD is noted no carotid bruits are auscultated oropharynx is without erythema or exudate appears to have a tumor on the left side of the face  Chest: Clear to auscultation percussion tenderness to palpation left lower chest wall in the lower rib area this extends down to the left upper quadrant of the abdomen she does have splenomegaly noted.    CV: Regular rate and rhythm normal S1-S2 no gallops murmurs or rubs  Abdomen: Soft tender left upper quadrant nondistended active bowel sounds no hepatomegaly moderate palpable spleen very tender no masses no hernias  Extremities: No clubbing edema or cyanosis capillary refill is normal pulses are 2+ and symmetric at radial and dorsalis pedis posterior tibial pulses are intact and 2+ palpable  Neuro: Patient is awake alert and oriented ×3, cranial nerves II through XII are grossly intact, motor is 5 out of 5 bilaterally, DTRs are 2+ and symmetric bilaterally sensory exam is nonfocal  Skin: Warm dry and intact no evidence of breakdown  Sensorium: Normal thought and affect  Nursing notes and vital signs reviewed        Results Reviewed:  Lab Results (last 24 hours)     Procedure Component Value Units Date/Time    Troponin [[id number]]  (Normal) Collected:  04/13/19 2037    Specimen:  Blood Updated:  04/13/19 2106     Troponin I 0.026 ng/mL     TSH [[id number]]  (Abnormal) Collected:  04/13/19 1724    Specimen:  Blood Updated:  04/13/19 1914     TSH 6.160 mIU/mL     T4, Free [[id number]]  (Normal) Collected:  04/13/19 1724    Specimen:  Blood Updated:  04/13/19 1900     Free T4 1.40 ng/dL     Urinalysis With " Culture If Indicated - Urine, Catheter [[id number]]  (Abnormal) Collected:  04/13/19 1809    Specimen:  Urine, Catheter Updated:  04/13/19 1820     Color, UA Yellow     Appearance, UA Cloudy     pH, UA 6.5     Specific Gravity, UA 1.012     Glucose, UA Negative     Ketones, UA Negative     Bilirubin, UA Negative     Blood, UA Moderate (2+)     Protein, UA Negative     Leuk Esterase, UA Trace     Nitrite, UA Negative     Urobilinogen, UA 2.0 E.U./dL    Urinalysis, Microscopic Only - Urine, Catheter [[id number]]  (Abnormal) Collected:  04/13/19 1809    Specimen:  Urine, Catheter Updated:  04/13/19 1820     RBC, UA 21-30 /HPF      WBC, UA 3-5 /HPF      Bacteria, UA None Seen /HPF      Squamous Epithelial Cells, UA 0-2 /HPF      Hyaline Casts, UA None Seen /LPF      Methodology Automated Microscopy    Troponin [[id number]]  (Normal) Collected:  04/13/19 1724    Specimen:  Blood Updated:  04/13/19 1756     Troponin I 0.023 ng/mL     BNP [[id number]]  (Normal) Collected:  04/13/19 1724    Specimen:  Blood Updated:  04/13/19 1751     proBNP 126.0 pg/mL     Myoglobin, Serum [[id number]]  (Normal) Collected:  04/13/19 1724    Specimen:  Blood Updated:  04/13/19 1751     Myoglobin 97.8 ng/mL     Protime-INR [[id number]]  (Abnormal) Collected:  04/13/19 1724    Specimen:  Blood Updated:  04/13/19 1742     Protime 18.6 Seconds      INR 1.51    aPTT [[id number]]  (Abnormal) Collected:  04/13/19 1724    Specimen:  Blood Updated:  04/13/19 1742     PTT 58.4 seconds     D-dimer, Quantitative [[id number]]  (Abnormal) Collected:  04/13/19 1724    Specimen:  Blood Updated:  04/13/19 1742     D-Dimer, Quantitative 2.31 mg/L (FEU)     Narrative:       Reference Range is 0-0.50 mg/L FEU. However, results <0.50 mg/L FEU tends to rule out DVT or PE. Results >0.50 mg/L FEU are not useful in predicting absence or presence of DVT or PE.    CK [[id number]]  (Abnormal) Collected:  04/13/19 1724    Specimen:  Blood Updated:  04/13/19 1741     Creatine  Kinase 286 U/L     Basic Metabolic Panel [[id number]]  (Abnormal) Collected:  04/13/19 1724    Specimen:  Blood Updated:  04/13/19 1741     Glucose 98 mg/dL      BUN 11 mg/dL      Creatinine 0.84 mg/dL      Sodium 139 mmol/L      Potassium 3.8 mmol/L      Chloride 108 mmol/L      CO2 29.0 mmol/L      Calcium 8.5 mg/dL      eGFR   Amer 82 mL/min/1.73      BUN/Creatinine Ratio 13.1     Anion Gap 2.0 mmol/L     Narrative:       GFR Normal >60  Chronic Kidney Disease <60  Kidney Failure <15    CBC & Differential [[id number]] Collected:  04/13/19 1724    Specimen:  Blood Updated:  04/13/19 1735    Narrative:       The following orders were created for panel order CBC & Differential.  Procedure                               Abnormality         Status                     ---------                               -----------         ------                     CBC Auto Differential[[id number]]        Abnormal            Final result                 Please view results for these tests on the individual orders.    CBC Auto Differential [[id number]]  (Abnormal) Collected:  04/13/19 1724    Specimen:  Blood Updated:  04/13/19 1735     WBC 3.48 10*3/mm3      RBC 3.73 10*6/mm3      Hemoglobin 10.8 g/dL      Hematocrit 32.9 %      MCV 88.2 fL      MCH 29.0 pg      MCHC 32.8 g/dL      RDW 15.8 %      RDW-SD 50.7 fl      MPV 11.6 fL      Platelets 78 10*3/mm3      Neutrophil % 55.4 %      Lymphocyte % 25.0 %      Monocyte % 13.8 %      Eosinophil % 4.9 %      Basophil % 0.6 %      Immature Grans % 0.3 %      Neutrophils, Absolute 1.93 10*3/mm3      Lymphocytes, Absolute 0.87 10*3/mm3      Monocytes, Absolute 0.48 10*3/mm3      Eosinophils, Absolute 0.17 10*3/mm3      Basophils, Absolute 0.02 10*3/mm3      Immature Grans, Absolute 0.01 10*3/mm3      nRBC 0.0 /100 WBC         Imaging Results (last 24 hours)     Procedure Component Value Units Date/Time    CT Angiogram Chest With Contrast [[id number]] Collected:  04/13/19 8835      Updated:  04/13/19 1910    Narrative:       Exam: CT angiogram of the of the chest with intravenous contrast.     CLINICAL HISTORY:  Chest pain.     DOSE:  387 mGycm. Automated exposure control was utilized to diminish  patient radiation dose.     TECHNIQUE: Contiguous axial images were obtained through the thorax  following intravenous contrast administration with reformatted images  obtained in the sagittal and coronal projections from the original data  set. Three-dimensional reconstructions are also obtained.     COMPARISON:  None available     FINDINGS:     Pulmonary arteries:  There is normal enhancement of the pulmonary  arteries with no evidence of pulmonary thromboembolic disease.     Aorta:  The thoracic aorta and proximal great vessels are normal in  appearance. There is no evidence of aortic dissection or aneurysm.     LUNGS:  Bibasilar atelectasis is present. The lungs are otherwise clear  without evidence of lobar pneumonia or effusion.     Pleural spaces: Unremarkable. No evidence of pleural effusion or  pneumothorax.     HEART: There is moderate cardiomegaly. No evidence of right ventricular  dysfunction. Coronary artery calcifications are noted in the LAD  distribution. No evidence of pericardial effusion.     Bones: No acute osseous abnormalities are demonstrated.     CHEST WALL: There is a masslike density associated with the right breast  and correlation is recommended with the physical exam. This may simply  represent some asymmetric breast tissue that is certainly asymmetric  with the contralateral breast. There is an enlarged right axillary node  measuring 13 mm in short axis.     Lymph nodes: No enlarged mediastinal or left axillary adenopathy is  present.     Upper abdomen: The liver is cirrhotic in appearance. Cholelithiasis is  present. The spleen is enlarged suggesting portal hypertension. There  are suspected varicosities within the upper abdomen. 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 This appears to emanate from the proximal left splenic artery.       Impression:       1. No evidence of pulmonary thromboembolic disease.  2. Bibasilar atelectasis. No evidence of lobar pneumonia or effusion.  3. There is a suspected right breast mass with asymmetric soft tissue  density within the breast. There is no enlarged right axillary node.  Correlation is recommended with the patient's physical exam. If not  recently undertaken follow-up with outpatient mammography would be  suggested.  4. Cirrhotic changes of the liver with splenomegaly and suspected upper  abdominal varices. FINDINGS suggest portal hypertension. There is a 2.1  cm aneurysm which appears to emanate from the proximal splenic artery  within the anterior upper abdomen.  This report was finalized on 04/13/2019 19:07 by Dr. Ace Moncada MD.        I have personally reviewed and interpreted the radiology studies and ECG obtained at time of admission.     Assessment / Plan     Assessment:   Active Hospital Problems    Diagnosis   • Chest pain   1.  Chest pain probably also involving abdominal pain I believe this is related to splenomegaly and splenic aneurysm will consult vascular surgery for further recommendations.  Due to the elevated troponin will also consult cardiology for recommendations.  Her troponins are currently trended down in the normal range.  I am not sure if this is part of the process or just a mild abnormality.  Will also check 2D echo.  2.  Abdominal pain splenomegaly aneurysm will do ultrasound of liver and spleen with Dopplers.  Will consult vascular surgery.  Hold anticoagulants currently.  Defer further testing and management to vascular surgery.  3.  Cirrhosis with evidence of varices continue current medications may need to consult GI she does seem to be followed by someone down in [place] who has been managing this per the  for many years.  Defer  per the further workup and plans per primary team.  4.  Type 2 diabetes cover sliding scale insulin Accu-Cheks.      Patient seen prior to midnight         Code Status: Full     I discussed the patient's findings and my recommendations with the patient and her  is her healthcare power surrogate    Estimated length of stay to be determined    Jhon Linda MD   04/13/19   11:55 PM              Electronically signed by Jhon Linda MD at 4/14/2019  2:29 AM       Prior to Admission Medications     Prescriptions Last Dose Informant Patient Reported? Taking?    CloNIDine (CATAPRES) 0.1 MG tablet   Yes Yes    Take 0.1 mg by mouth 1 (One) Time.    furosemide (LASIX) 40 MG tablet   Yes Yes    Take 40 mg by mouth 2 (Two) Times a Day.    insulin aspart (novoLOG) 100 UNIT/ML injection   Yes Yes    Inject  under the skin into the appropriate area as directed 3 (Three) Times a Day Before Meals.    insulin glargine (LANTUS) 100 UNIT/ML injection   Yes Yes    Inject 10 Units under the skin into the appropriate area as directed 2 (Two) Times a Day.    lactulose (CHRONULAC) 10 GM/15ML solution   Yes Yes    Take 20 g by mouth 2 (Two) Times a Day As Needed.    levothyroxine (SYNTHROID, LEVOTHROID) 75 MCG tablet   Yes Yes    Take 75 mcg by mouth Daily.    POTASSIUM CHLORIDE ER PO   Yes Yes    Take  by mouth.          Hospital Medications (active)       Dose Frequency Start End    acetaminophen (TYLENOL) tablet 650 mg 650 mg Every 4 Hours PRN 4/15/2019     Sig - Route: Take 2 tablets by mouth Every 4 (Four) Hours As Needed for Mild Pain . - Oral    aspirin tablet 325 mg 325 mg Daily 4/14/2019     Sig - Route: Take 1 tablet by mouth Daily. - Oral    carvedilol (COREG) tablet 3.125 mg 3.125 mg Every 12 Hours Scheduled 4/14/2019     Sig - Route: Take 1 tablet by mouth Every 12 (Twelve) Hours. - Oral    CloNIDine (CATAPRES) tablet 0.1 mg 0.1 mg Every 6 Hours PRN 4/13/2019     Sig - Route: Take 1 tablet by mouth Every 6  (Six) Hours As Needed for High Blood Pressure (SBP>160). - Oral    clopidogrel (PLAVIX) tablet 75 mg 75 mg Daily 4/16/2019     Sig - Route: Take 1 tablet by mouth Daily. - Oral    dextrose (D50W) 25 g/ 50mL Intravenous Solution 25 g 25 g Every 15 Minutes PRN 4/13/2019     Sig - Route: Infuse 50 mL into a venous catheter Every 15 (Fifteen) Minutes As Needed for Low Blood Sugar (Blood Sugar Less Than 70). - Intravenous    dextrose (GLUTOSE) oral gel 15 g 15 g Every 15 Minutes PRN 4/13/2019     Sig - Route: Take 15 g by mouth Every 15 (Fifteen) Minutes As Needed for Low Blood Sugar (Blood sugar less than 70). - Oral    furosemide (LASIX) tablet 40 mg 40 mg 2 Times Daily (Diuretics) 4/14/2019     Sig - Route: Take 1 tablet by mouth 2 (Two) Times a Day. - Oral    glucagon (human recombinant) (GLUCAGEN DIAGNOSTIC) injection 1 mg 1 mg As Needed 4/13/2019     Sig - Route: Inject 1 mg under the skin into the appropriate area as directed As Needed (Blood Glucose Less Than 70). - Subcutaneous    HYDROcodone-acetaminophen (NORCO) 5-325 MG per tablet 1 tablet 1 tablet Every 4 Hours PRN 4/15/2019 4/25/2019    Sig - Route: Take 1 tablet by mouth Every 4 (Four) Hours As Needed for Moderate Pain . - Oral    insulin lispro (humaLOG) injection 0-9 Units 0-9 Units 4 Times Daily With Meals & Nightly 4/14/2019     Sig - Route: Inject 0-9 Units under the skin into the appropriate area as directed 4 (Four) Times a Day With Meals & at Bedtime. - Subcutaneous    lactulose solution 20 g 20 g 3 Times Daily 4/15/2019     Sig - Route: Take 30 mL by mouth 3 (Three) Times a Day. - Oral    levothyroxine (SYNTHROID, LEVOTHROID) tablet 88 mcg 88 mcg Every Early Morning 4/14/2019     Sig - Route: Take 1 tablet by mouth Every Morning. - Oral    Morphine sulfate (PF) injection 1 mg 1 mg Every 4 Hours PRN 4/13/2019 4/23/2019    Sig - Route: Infuse 0.5 mL into a venous catheter Every 4 (Four) Hours As Needed for Severe Pain  (Chest pain). - Intravenous     "Linked Group 1:  \"And\" Linked Group Details        naloxone (NARCAN) injection 0.4 mg 0.4 mg Every 5 Minutes PRN 4/13/2019     Sig - Route: Infuse 1 mL into a venous catheter Every 5 (Five) Minutes As Needed for Respiratory Depression. - Intravenous    Linked Group 1:  \"And\" Linked Group Details        nitroglycerin (NITROSTAT) SL tablet 0.4 mg 0.4 mg Every 5 Minutes PRN 4/13/2019     Sig - Route: Place 1 tablet under the tongue Every 5 (Five) Minutes As Needed for Chest Pain. - Sublingual    nystatin (MYCOSTATIN) powder  Every 12 Hours Scheduled 4/16/2019     Sig - Route: Apply  topically to the appropriate area as directed Every 12 (Twelve) Hours. - Topical    ondansetron (ZOFRAN) injection 4 mg 4 mg Every 6 Hours PRN 4/15/2019     Sig - Route: Infuse 2 mL into a venous catheter Every 6 (Six) Hours As Needed for Nausea or Vomiting. - Intravenous    Linked Group 2:  \"Or\" Linked Group Details        ondansetron (ZOFRAN) tablet 4 mg 4 mg Every 6 Hours PRN 4/15/2019     Sig - Route: Take 1 tablet by mouth Every 6 (Six) Hours As Needed for Nausea or Vomiting. - Oral    Linked Group 2:  \"Or\" Linked Group Details        oxyCODONE-acetaminophen (PERCOCET) 7.5-325 MG per tablet 1 tablet 1 tablet Every 4 Hours PRN 4/15/2019 4/25/2019    Sig - Route: Take 1 tablet by mouth Every 4 (Four) Hours As Needed for Severe Pain . - Oral    potassium chloride (MICRO-K) CR capsule 40 mEq 40 mEq Once 4/17/2019 4/17/2019    Sig - Route: Take 4 capsules by mouth 1 (One) Time. - Oral    rosuvastatin (CRESTOR) tablet 20 mg 20 mg Nightly 4/14/2019     Sig - Route: Take 1 tablet by mouth Every Night. - Oral    sodium chloride 0.9 % flush 10 mL 10 mL As Needed 4/13/2019     Sig - Route: Infuse 10 mL into a venous catheter As Needed for Line Care. - Intravenous    Linked Group 3:  \"And\" Linked Group Details        sodium chloride 0.9 % flush 3 mL 3 mL Every 12 Hours Scheduled 4/14/2019     Sig - Route: Infuse 3 mL into a venous catheter Every " 12 (Twelve) Hours. - Intravenous    sodium chloride 0.9 % flush 3-10 mL 3-10 mL As Needed 4/13/2019     Sig - Route: Infuse 3-10 mL into a venous catheter As Needed for Line Care. - Intravenous    spironolactone (ALDACTONE) tablet 25 mg 25 mg Daily 4/16/2019     Sig - Route: Take 1 tablet by mouth Daily. - Oral            Discharge Summary     No notes of this type exist for this encounter.

## 2019-04-17 NOTE — PROGRESS NOTES
Discharge Planning Assessment  [facility]     Patient Name: Daniel Oliver  MRN: [id number]  Today's Date: 4/17/2019    Admit Date: 4/13/2019    Discharge Needs Assessment     Row Name 04/17/19 1033       Living Environment    Lives With  spouse    Name(s) of Who Lives With Patient  Pt resides with spouse and son.    Current Living Arrangements  home/apartment/condo    Primary Care Provided by  self    Provides Primary Care For  no one, unable/limited ability to care for self    Family Caregiver if Needed  child(shorty), adult;spouse    Quality of Family Relationships  helpful;involved;supportive    Able to Return to Prior Arrangements  yes       Resource/Environmental Concerns    Resource/Environmental Concerns  none       Transition Planning    Patient/Family Anticipates Transition to  home with family    Patient/Family Anticipated Services at Transition  home health care    Transportation Anticipated  family or friend will provide       Discharge Needs Assessment    Readmission Within the Last 30 Days  no previous admission in last 30 days    Concerns to be Addressed  no discharge needs identified    Equipment Currently Used at Home  walker, rolling    Anticipated Changes Related to Illness  none    Equipment Needed After Discharge  none    Discharge Coordination/Progress  Pt has PCP and RX coverage. Pt can afford medications.  Pt is requesting [facility] upon dc.  SW will follow for MD orders.          Discharge Plan    No documentation.       Destination      No service coordination in this encounter.      Durable Medical Equipment      No service coordination in this encounter.      Dialysis/Infusion      No service coordination in this encounter.      Home Medical Care      No service coordination in this encounter.      Therapy      No service coordination in this encounter.      Community Resources      No service coordination in this encounter.          Demographic Summary    No documentation.        Functional Status    No documentation.       Psychosocial    No documentation.       Abuse/Neglect    No documentation.       Legal    No documentation.       Substance Abuse    No documentation.       Patient Forms    No documentation.           ROEL GatesW

## 2019-04-18 ENCOUNTER — READMISSION MANAGEMENT (OUTPATIENT)
Dept: CALL CENTER | Facility: HOSPITAL | Age: 69
End: 2019-04-18

## 2019-04-18 NOTE — OUTREACH NOTE
Prep Survey      Responses   Facility patient discharged from?  [place]   Is patient eligible?  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  Other   Does the patient have Home health ordered?  Yes   What is the Home health agency?   [facility] , [facility]   Is there a DME ordered?  No   Comments regarding appointments  See AVS   Prep survey completed?  Yes          Haydee Baez RN

## 2019-04-19 ENCOUNTER — READMISSION MANAGEMENT (OUTPATIENT)
Dept: CALL CENTER | Facility: HOSPITAL | Age: 69
End: 2019-04-19

## 2019-04-19 NOTE — OUTREACH NOTE
Medical Week 1 Survey      Responses   Facility patient discharged from?  [place]   Does the patient have one of the following disease processes/diagnoses(primary or secondary)?  Other   Is there a successful TCM telephone encounter documented?  No   Week 1 attempt successful?  No   Unsuccessful attempts  Attempt 1          Denisa Canchola RN

## 2019-04-19 NOTE — THERAPY DISCHARGE NOTE
Acute Care - Occupational Therapy Discharge Summary  [facility]     Patient Name: Daniel Oliver  : 1950  MRN: [id number]    Today's Date: 2019  Onset of Illness/Injury or Date of Surgery: 19    Date of Referral to OT: 04/15/19  Referring Physician: DEISI Edwards      Admit Date: 2019        OT Recommendation and Plan    Visit Dx:    ICD-10-CM ICD-9-CM   1. Chest pain, unspecified type R07.9 786.50   2. Splenic artery aneurysm (CMS/HCC) I72.8 442.83   3. Impaired functional mobility, balance, gait, and endurance Z74.09 V49.89   4. Breast mass, right N63.10 611.72   5. Disorder of breast  N64.9 611.9               Rehab Goal Summary     Row Name 19 0800             Transfer Goal 1 (OT)    Activity/Assistive Device (Transfer Goal 1, OT)  sit-to-stand/stand-to-sit;bed-to-chair/chair-to-bed;commode, 3-in-1;walker, rolling  -TS      [place] Level/Cues Needed (Transfer Goal 1, OT)  supervision required;verbal cues required  -TS      Time Frame (Transfer Goal 1, OT)  long term goal (LTG);by discharge  -TS      Progress/Outcome (Transfer Goal 1, OT)  goal not met  -TS         Toileting Goal 1 (OT)    Activity/Device (Toileting Goal 1, OT)  toileting skills, all;adjust/manage clothing;perform perineal hygiene;commode, 3-in-1  -TS      [place] Level/Cues Needed (Toileting Goal 1, OT)  minimum assist (75% or more patient effort);verbal cues required  -TS      Time Frame (Toileting Goal 1, OT)  long term goal (LTG);by discharge  -TS      Progress/Outcome (Toileting Goal 1, OT)  goal not met  -TS        User Key  (r) = Recorded By, (t) = Taken By, (c) = Cosigned By    Initials Name Provider Type Discipline    TS Eva Douglas, CROOK/L Occupational Therapy Assistant OT          Outcome Measures     Row Name 19 1500 19 1400          How much help from another person do you currently need...    Turning from your back to your side while in flat bed without using bedrails?  --   3  -SB     Moving from lying on back to sitting on the side of a flat bed without bedrails?  --  3  -SB     Moving to and from a bed to a chair (including a wheelchair)?  --  3  -SB     Standing up from a chair using your arms (e.g., wheelchair, bedside chair)?  --  3  -SB     Climbing 3-5 steps with a railing?  --  1  -SB     To walk in hospital room?  --  3  -SB     AM-PAC 6 Clicks Score  --  16  -SB        How much help from another is currently needed...    Putting on and taking off regular lower body clothing?  1  -CH  --     Bathing (including washing, rinsing, and drying)  2  -CH  --     Toileting (which includes using toilet bed pan or urinal)  2  -CH  --     Putting on and taking off regular upper body clothing  3  -CH  --     Taking care of personal grooming (such as brushing teeth)  4  -CH  --     Eating meals  4  -CH  --     Score  16  -CH  --        Functional Assessment    Outcome Measure Options  AM-PAC 6 Clicks Daily Activity (OT)  -CH  AM-PAC 6 Clicks Basic Mobility (PT)  -SB       User Key  (r) = Recorded By, (t) = Taken By, (c) = Cosigned By    Initials Name Provider Type    CH Renetta Garrison, OTR/L Occupational Therapist    Kasey Montenegro PT Physical Therapist          Therapy Suggested Charges     Code   Minutes Charges    None                 OT Discharge Summary  Reason for Discharge: Discharge from facility  Outcomes Achieved: Refer to plan of care for updates on goals achieved  Discharge Destination: Home with assist      NATALIA Ac  4/19/2019

## 2019-04-23 ENCOUNTER — READMISSION MANAGEMENT (OUTPATIENT)
Dept: CALL CENTER | Facility: HOSPITAL | Age: 69
End: 2019-04-23

## 2019-04-23 NOTE — OUTREACH NOTE
Medical Week 1 Survey      Responses   Facility patient discharged from?  [place]   Does the patient have one of the following disease processes/diagnoses(primary or secondary)?  Other   Is there a successful TCM telephone encounter documented?  No   Week 1 attempt successful?  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  Yes   Is the patient having any side effects they believe may be caused by any medication additions or changes?  No   Does the patient have all medications ordered at discharge?  Yes   Is the patient taking all medications as directed (includes completed medication regime)?  Yes   Comments regarding appointments  Has an appt with the Vascular surgeon on May 2 2019   Does the patient have a primary care provider?   Yes   Does the patient have an appointment with their PCP within 7 days of discharge?  Yes   Comments regarding PCP  PCP is Dr Fritz- encouraged patient to make followup appt   Has the patient kept scheduled appointments due by today?  Yes   What is the Home health agency?   [facility]   Has home health visited the patient within 72 hours of discharge?  N/A   Has all DME been delivered?  No   Psychosocial issues?  No   Did the patient receive a copy of their discharge instructions?  Yes   Nursing interventions  Referred to HIM, Reviewed instructions with patient   What is the patient's perception of their health status since discharge?  Improving   Is the patient/caregiver able to teach back signs and symptoms related to disease process for when to call PCP?  Yes   Is the patient/caregiver able to teach back signs and symptoms related to disease process for when to call 911?  Yes   Is  the patient/caregiver able to teach back the hierarchy of who to call/visit for symptoms/problems? PCP, Specialist, Home health nurse, Urgent Care, ED, 911  Yes   Week 1 call completed?  Yes          Surendra Spear RN

## 2019-05-01 ENCOUNTER — TELEPHONE (OUTPATIENT)
Dept: VASCULAR SURGERY | Facility: CLINIC | Age: 69
End: 2019-05-01

## 2019-05-01 NOTE — TELEPHONE ENCOUNTER
Spoke with Mr Oliver reminding him of Mrs Oliver's appointment for Thursday, May 2nd, 2019 at 845 am with Dr Orosco. Mr Oliver confirmed Mrs Oliver would be here.

## 2019-05-02 ENCOUNTER — READMISSION MANAGEMENT (OUTPATIENT)
Dept: CALL CENTER | Facility: HOSPITAL | Age: 69
End: 2019-05-02

## 2019-05-02 ENCOUNTER — TELEPHONE (OUTPATIENT)
Dept: VASCULAR SURGERY | Facility: CLINIC | Age: 69
End: 2019-05-02

## 2019-05-02 NOTE — OUTREACH NOTE
Medical Week 2 Survey      Responses   Facility patient discharged from?  [place]   Does the patient have one of the following disease processes/diagnoses(primary or secondary)?  Other   Week 2 attempt successful?  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  Yes   List who call center can speak with  ketty Ruiz   Meds reviewed with patient/caregiver?  Yes   Is the patient taking all medications as directed (includes completed medication regime)?  Yes   Has the patient kept scheduled appointments due by today?  Yes   Comments  Has seen new PCP. Dr Reyes in [facility].   Rescheduled  Dr Orosco 05/16   What is the Home health agency?   [facility]    Home health comments  Son says  comes everyday   Did the patient receive a copy of their discharge instructions?  Yes   What is the patient's perception of their health status since discharge?  Improving   Week 2 Call Completed?  Yes          Blanca Bravo RN

## 2019-05-02 NOTE — TELEPHONE ENCOUNTER
Patient's son requested the patient's apt to be moved out.  I asked how she was doing since her surgery, and he said she was doing fine.    I rescheduled her for 5/16/19.

## 2019-05-09 ENCOUNTER — READMISSION MANAGEMENT (OUTPATIENT)
Dept: CALL CENTER | Facility: HOSPITAL | Age: 69
End: 2019-05-09

## 2019-05-09 NOTE — OUTREACH NOTE
Medical Week 3 Survey      Responses   Facility patient discharged from?  [place]   Does the patient have one of the following disease processes/diagnoses(primary or secondary)?  Other   Week 3 attempt successful?  Yes   Call start time  1717   Call end time  1718   Medication alerts for this patient  no changes in medication   Meds reviewed with patient/caregiver?  Yes   Is the patient taking all medications as directed (includes completed medication regime)?  Yes   Comments regarding appointments  seeing the doctor on 16th   Has the patient kept scheduled appointments due by today?  Yes   Did the patient receive a copy of their discharge instructions?  Yes   What is the patient's perception of their health status since discharge?  Improving   Week 3 Call Completed?  Yes   Wrap up additional comments  home health nurse came today, through she was rabia Sanders RN

## 2019-05-15 ENCOUNTER — TELEPHONE (OUTPATIENT)
Dept: VASCULAR SURGERY | Facility: CLINIC | Age: 69
End: 2019-05-15

## 2019-05-15 NOTE — TELEPHONE ENCOUNTER
Left message reminding Ms Oliver of her appointment for Thursday, May 16th, 2019 at 115 pm with Dr Orosco. Also advised if she had any questions or needed to reschedule to please call the office at [phone]

## 2019-05-16 ENCOUNTER — READMISSION MANAGEMENT (OUTPATIENT)
Dept: CALL CENTER | Facility: HOSPITAL | Age: 69
End: 2019-05-16

## 2019-05-16 NOTE — OUTREACH NOTE
Medical Week 4 Survey      Responses   Facility patient discharged from?  [place]   Does the patient have one of the following disease processes/diagnoses(primary or secondary)?  Other   Week 4 attempt successful?  No          Yudelka Wing RN

## 2019-05-22 ENCOUNTER — TELEPHONE (OUTPATIENT)
Dept: VASCULAR SURGERY | Facility: CLINIC | Age: 69
End: 2019-05-22

## 2019-05-22 NOTE — TELEPHONE ENCOUNTER
Spoke with Mrs Oliver reminding her of her appointment for Thursday, May 23rd, 2019 at 130 pm with Dr Orosco. Mrs Oliver confirmed she would be here.

## 2019-05-23 ENCOUNTER — OFFICE VISIT (OUTPATIENT)
Dept: VASCULAR SURGERY | Facility: CLINIC | Age: 69
End: 2019-05-23

## 2019-05-23 VITALS
HEIGHT: 66 IN | OXYGEN SATURATION: 98 % | SYSTOLIC BLOOD PRESSURE: 124 MMHG | DIASTOLIC BLOOD PRESSURE: 80 MMHG | HEART RATE: 69 BPM | WEIGHT: 192 LBS | BODY MASS INDEX: 30.86 KG/M2

## 2019-05-23 DIAGNOSIS — M79.89 LEG SWELLING: ICD-10-CM

## 2019-05-23 DIAGNOSIS — I87.323 CHRONIC VENOUS HYPERTENSION WITH INFLAMMATION INVOLVING BOTH SIDES: ICD-10-CM

## 2019-05-23 DIAGNOSIS — I72.8 SPLENIC ARTERY ANEURYSM (HCC): Primary | ICD-10-CM

## 2019-05-23 DIAGNOSIS — Q82.0 HEREDITARY EDEMA OF LEGS: ICD-10-CM

## 2019-05-23 PROCEDURE — 99214 OFFICE O/P EST MOD 30 MIN: CPT | Performed by: SURGERY

## 2019-05-23 NOTE — PROGRESS NOTES
05/23/2019      See Fritz DO  [address] [place] [address]    Daniel Oliver  1950    Chief Complaint   Patient presents with   • Post-op     FOR - DIAGNOSTIC ARTERIOGRAM MESENTERIC WITH STENT PLACEMENT       Dear See Fritz, *:      HPI  I had the pleasure of seeing your patient Daniel Oliver in the office today.  Thank you kindly for this consultation.  As you recall, Daniel Oliver is a 68 y.o.  female who you are currently following for routine health maintenance.  Recently, she was in the hospital for left-sided chest pain.  During her work-up she was found to have a large saccular 2.1 cm splenic artery aneurysm.  She underwent percutaneous repair and coverage of this aneurysm with a covered stent graft.  She is now here for follow-up.  I am pleased to report she is doing quite well without any complaints of abdominal pain or back pain.  Her groin is nice and soft without hematoma.    Past Medical History:   Diagnosis Date   • Diabetes mellitus (CMS/HCC)    • Disease of thyroid gland    • Hypertension    • Liver cirrhosis (CMS/HCC)        Past Surgical History:   Procedure Laterality Date   • ARTERIOGRAM MESENTERIC Right 4/15/2019    Procedure: DIAGNOSTIC ARTERIOGRAM MESENTERIC WITH STENT PLACEMENT;  Surgeon: Joel Orosco DO;  Location: [place] [address];  Service: Vascular   • HIP ARTHROPLASTY         History reviewed. No pertinent family history.    Social History     Socioeconomic History   • Marital status:      Spouse name: Not on file   • Number of children: Not on file   • Years of education: Not on file   • Highest education level: Not on file   Tobacco Use   • Smoking status: Never Smoker   • Smokeless tobacco: Never Used   Substance and Sexual Activity   • Drug use: No   • Sexual activity: Defer       No Known Allergies    Prior to Admission medications    Medication Sig Start Date End Date Taking? Authorizing Provider   aspirin 81 MG EC tablet  "Take 1 tablet by mouth Daily. 4/17/19  Yes Luz Diaz APRN   carvedilol (COREG) 3.125 MG tablet Take 1 tablet by mouth Every 12 (Twelve) Hours. 4/17/19  Yes Luz Diaz APRN   CloNIDine (CATAPRES) 0.1 MG tablet Take 0.1 mg by mouth 1 (One) Time.   Yes ProviderYudelka MD   clopidogrel (PLAVIX) 75 MG tablet Take 1 tablet by mouth Daily. 4/17/19  Yes Luz Diaz APRN   furosemide (LASIX) 40 MG tablet Take 40 mg by mouth 2 (Two) Times a Day.   Yes ProviderYudelka MD   insulin glargine (LANTUS) 100 UNIT/ML injection Inject 10 Units under the skin into the appropriate area as directed 2 (Two) Times a Day.   Yes ProviderYudelka MD   lactulose (CHRONULAC) 10 GM/15ML solution solution (encephalopathy) Take 30 mL by mouth 3 (Three) Times a Day. 4/17/19  Yes Luz Diaz APRN   levothyroxine (SYNTHROID, LEVOTHROID) 88 MCG tablet Take 1 tablet by mouth Daily. 4/17/19  Yes Luz Diaz APRN   spironolactone (ALDACTONE) 25 MG tablet Take 1 tablet by mouth Daily. 4/18/19  Yes Luz Diaz APRN       Review of Systems   Constitutional: Negative.    HENT: Negative.    Eyes: Negative.    Respiratory: Negative.    Cardiovascular: Positive for leg swelling (bilateral left greater than right).   Gastrointestinal: Negative.    Endocrine: Negative.    Genitourinary: Negative.    Musculoskeletal: Negative.    Skin: Negative.    Allergic/Immunologic: Negative.    Neurological: Negative.    Hematological: Negative.    Psychiatric/Behavioral: Negative.        /80 (BP Location: Left arm, Patient Position: Sitting, Cuff Size: Adult)   Pulse 69   Ht 167.6 cm (65.98\")   Wt 87.1 kg (192 lb)   SpO2 98%   BMI 31.01 kg/m²   Physical Exam   Constitutional: She is oriented to person, place, and time. She appears well-developed and well-nourished.   HENT:   Head: Normocephalic and atraumatic.   Eyes: Pupils are equal, round, and reactive to light. No " scleral icterus.   Neck: Neck supple. No JVD present. Carotid bruit is not present. No thyromegaly present.   Cardiovascular: Normal rate, regular rhythm and normal heart sounds.   Pulses:       Carotid pulses are 2+ on the right side, and 2+ on the left side.       Femoral pulses are 2+ on the right side, and 2+ on the left side.       Popliteal pulses are 2+ on the right side, and 2+ on the left side.        Dorsalis pedis pulses are 2+ on the right side, and 2+ on the left side.        Posterior tibial pulses are 2+ on the right side, and 2+ on the left side.   Significant leg swelling bilaterally with hyperpigmentation  Right                                        Left     Ankle  11 in                              Ankle   13 in   Calf    16 in                              Calf      18 in    Pulmonary/Chest: Effort normal and breath sounds normal.   Abdominal: Soft. Bowel sounds are normal. She exhibits no distension, no abdominal bruit and no mass. There is no hepatosplenomegaly. There is no tenderness.   Musculoskeletal: Normal range of motion. She exhibits no edema.   Lymphadenopathy:     She has no cervical adenopathy.   Neurological: She is alert and oriented to person, place, and time. She has normal strength. No cranial nerve deficit or sensory deficit.   Skin: Skin is warm, dry and intact.   Psychiatric: She has a normal mood and affect.   Nursing note and vitals reviewed.      No results found.    Patient Active Problem List   Diagnosis   • Chest pain   • Left upper quadrant pain   • Splenic artery aneurysm (CMS/HCC)   • Splenomegaly   • Elevated troponin   • Liver cirrhosis (CMS/HCC)   • Diabetes mellitus (CMS/HCC)   • Disease of thyroid gland   • Normocytic anemia   • Pancytopenia (CMS/HCC)         ICD-10-CM ICD-9-CM   1. Splenic artery aneurysm (CMS/HCC) I72.8 442.83   2. Chronic venous hypertension with inflammation involving both sides I87.323 459.32   3. Leg swelling M79.89 729.81   4. 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  Her groin is nice and soft without hematoma and she denies any abdominal pain.  I will see her back in the next 1 to 2 weeks with a CTA of the abdomen and pelvis for surveillance.  I did notice that she had significant leg swelling bilaterally.  We did give her a prescription for compression stockings in the 20 to 30 mm pressure gradient range.  We instructed her on how to use them on a daily basis and to keep her legs elevated when she is not on them.  I also believe she would be an excellent candidate for home lymphedema pumps.  We will get her information to our representative for proper fitting.  The patient can continue taking their current medication regimen as previously planned.  This was all discussed in full with complete understanding.    The patient presents with stage 2 lymphedema Q82.0 exacerbated by chronic venous insufficiency.  The patient's consistent use of gradient compression of 20-30 mmHg along with exercise and elevation, has had limited success in managing their swelling for years.  Prognosis is fair; however, the calf, ankle, thigh, and truncal edema will worsen without further treatment.       Thank you for allowing me to participate in the care of your patient.  Please do not hesitate with any questions or concerns.  I will keep you aware of any further encounters with Daniel Oliver.        Sincerely yours,         Joel Orosco, DO

## 2019-06-18 ENCOUNTER — TELEPHONE (OUTPATIENT)
Dept: VASCULAR SURGERY | Facility: CLINIC | Age: 69
End: 2019-06-18

## 2019-06-22 ENCOUNTER — HOSPITAL ENCOUNTER (INPATIENT)
Facility: HOSPITAL | Age: 69
LOS: 2 days | Discharge: HOME HEALTH | End: 2019-06-24
Attending: INTERNAL MEDICINE | Admitting: INTERNAL MEDICINE

## 2019-06-22 DIAGNOSIS — K76.82 HEPATIC ENCEPHALOPATHY (HCC): Primary | ICD-10-CM

## 2019-06-22 PROBLEM — E87.5 HYPERKALEMIA: Status: ACTIVE | Noted: 2019-06-22

## 2019-06-22 PROBLEM — N17.9 ACUTE KIDNEY INJURY (HCC): Status: ACTIVE | Noted: 2019-06-22

## 2019-06-22 LAB
ALBUMIN SERPL-MCNC: 2.6 G/DL (ref 3.5–5)
ALBUMIN/GLOB SERPL: 0.7 G/DL (ref 1.1–2.5)
ALP SERPL-CCNC: 234 U/L (ref 24–120)
ALT SERPL W P-5'-P-CCNC: 37 U/L (ref 0–54)
AMMONIA BLD-SCNC: 164 UMOL/L (ref 9–33)
ANION GAP SERPL CALCULATED.3IONS-SCNC: 5 MMOL/L (ref 4–13)
AST SERPL-CCNC: 51 U/L (ref 7–45)
BACTERIA UR QL AUTO: ABNORMAL /HPF
BILIRUB SERPL-MCNC: 1.8 MG/DL (ref 0.1–1)
BILIRUB UR QL STRIP: NEGATIVE
BUN BLD-MCNC: 20 MG/DL (ref 5–21)
BUN/CREAT SERPL: 15.6 (ref 7–25)
CALCIUM SPEC-SCNC: 9.6 MG/DL (ref 8.4–10.4)
CHLORIDE SERPL-SCNC: 117 MMOL/L (ref 98–110)
CLARITY UR: CLEAR
CO2 SERPL-SCNC: 19 MMOL/L (ref 24–31)
COLOR UR: YELLOW
CREAT BLD-MCNC: 1.28 MG/DL (ref 0.5–1.4)
DEPRECATED RDW RBC AUTO: 50.3 FL (ref 40–54)
ERYTHROCYTE [DISTWIDTH] IN BLOOD BY AUTOMATED COUNT: 15.3 % (ref 12–15)
GFR SERPL CREATININE-BSD FRML MDRD: 50 ML/MIN/1.73
GLOBULIN UR ELPH-MCNC: 3.8 GM/DL
GLUCOSE BLD-MCNC: 73 MG/DL (ref 70–100)
GLUCOSE BLDC GLUCOMTR-MCNC: 80 MG/DL (ref 70–130)
GLUCOSE UR STRIP-MCNC: NEGATIVE MG/DL
HCT VFR BLD AUTO: 32 % (ref 37–47)
HGB BLD-MCNC: 10.6 G/DL (ref 12–16)
HGB UR QL STRIP.AUTO: NEGATIVE
HYALINE CASTS UR QL AUTO: ABNORMAL /LPF
INR PPP: 1.46 (ref 0.91–1.09)
KETONES UR QL STRIP: NEGATIVE
LEUKOCYTE ESTERASE UR QL STRIP.AUTO: ABNORMAL
MCH RBC QN AUTO: 29.4 PG (ref 28–32)
MCHC RBC AUTO-ENTMCNC: 33.1 G/DL (ref 33–36)
MCV RBC AUTO: 88.6 FL (ref 82–98)
NITRITE UR QL STRIP: NEGATIVE
PH UR STRIP.AUTO: 6 [PH] (ref 5–8)
PLATELET # BLD AUTO: 84 10*3/MM3 (ref 130–400)
PMV BLD AUTO: 11.6 FL (ref 6–12)
POTASSIUM BLD-SCNC: 5 MMOL/L (ref 3.5–5.3)
PROT SERPL-MCNC: 6.4 G/DL (ref 6.3–8.7)
PROT UR QL STRIP: NEGATIVE
PROTHROMBIN TIME: 18.2 SECONDS (ref 11.9–14.6)
RBC # BLD AUTO: 3.61 10*6/MM3 (ref 4.2–5.4)
RBC # UR: ABNORMAL /HPF
REF LAB TEST METHOD: ABNORMAL
SODIUM BLD-SCNC: 141 MMOL/L (ref 135–145)
SP GR UR STRIP: 1.01 (ref 1–1.03)
SQUAMOUS #/AREA URNS HPF: ABNORMAL /HPF
TROPONIN I SERPL-MCNC: 0.02 NG/ML (ref 0–0.03)
TROPONIN I SERPL-MCNC: 0.02 NG/ML (ref 0–0.03)
TSH SERPL DL<=0.05 MIU/L-ACNC: 1.66 MIU/ML (ref 0.47–4.68)
UROBILINOGEN UR QL STRIP: ABNORMAL
WBC NRBC COR # BLD: 4.12 10*3/MM3 (ref 4.8–10.8)
WBC UR QL AUTO: ABNORMAL /HPF

## 2019-06-22 PROCEDURE — 81001 URINALYSIS AUTO W/SCOPE: CPT | Performed by: INTERNAL MEDICINE

## 2019-06-22 PROCEDURE — 84484 ASSAY OF TROPONIN QUANT: CPT | Performed by: INTERNAL MEDICINE

## 2019-06-22 PROCEDURE — 82962 GLUCOSE BLOOD TEST: CPT

## 2019-06-22 PROCEDURE — 84443 ASSAY THYROID STIM HORMONE: CPT | Performed by: INTERNAL MEDICINE

## 2019-06-22 PROCEDURE — 80053 COMPREHEN METABOLIC PANEL: CPT | Performed by: INTERNAL MEDICINE

## 2019-06-22 PROCEDURE — 85027 COMPLETE CBC AUTOMATED: CPT | Performed by: INTERNAL MEDICINE

## 2019-06-22 PROCEDURE — 83036 HEMOGLOBIN GLYCOSYLATED A1C: CPT | Performed by: INTERNAL MEDICINE

## 2019-06-22 PROCEDURE — 93005 ELECTROCARDIOGRAM TRACING: CPT | Performed by: INTERNAL MEDICINE

## 2019-06-22 PROCEDURE — 93010 ELECTROCARDIOGRAM REPORT: CPT | Performed by: INTERNAL MEDICINE

## 2019-06-22 PROCEDURE — 82140 ASSAY OF AMMONIA: CPT | Performed by: INTERNAL MEDICINE

## 2019-06-22 PROCEDURE — 85610 PROTHROMBIN TIME: CPT | Performed by: INTERNAL MEDICINE

## 2019-06-22 RX ORDER — SODIUM CHLORIDE 0.9 % (FLUSH) 0.9 %
3 SYRINGE (ML) INJECTION EVERY 12 HOURS SCHEDULED
Status: DISCONTINUED | OUTPATIENT
Start: 2019-06-22 | End: 2019-06-24 | Stop reason: HOSPADM

## 2019-06-22 RX ORDER — ONDANSETRON 2 MG/ML
4 INJECTION INTRAMUSCULAR; INTRAVENOUS EVERY 6 HOURS PRN
Status: DISCONTINUED | OUTPATIENT
Start: 2019-06-22 | End: 2019-06-24 | Stop reason: HOSPADM

## 2019-06-22 RX ORDER — SODIUM CHLORIDE 0.9 % (FLUSH) 0.9 %
3-10 SYRINGE (ML) INJECTION AS NEEDED
Status: DISCONTINUED | OUTPATIENT
Start: 2019-06-22 | End: 2019-06-24 | Stop reason: HOSPADM

## 2019-06-22 RX ORDER — DEXTROSE AND SODIUM CHLORIDE 5; .9 G/100ML; G/100ML
50 INJECTION, SOLUTION INTRAVENOUS CONTINUOUS
Status: DISCONTINUED | OUTPATIENT
Start: 2019-06-22 | End: 2019-06-23

## 2019-06-22 RX ADMIN — LACTULOSE 300 ML: 10 SOLUTION ORAL at 15:37

## 2019-06-22 RX ADMIN — DEXTROSE AND SODIUM CHLORIDE 50 ML/HR: 5; 900 INJECTION, SOLUTION INTRAVENOUS at 15:37

## 2019-06-22 RX ADMIN — RIFAXIMIN 550 MG: 550 TABLET ORAL at 20:44

## 2019-06-23 LAB
ALBUMIN SERPL-MCNC: 2.2 G/DL (ref 3.5–5)
ALBUMIN/GLOB SERPL: 0.6 G/DL (ref 1.1–2.5)
ALP SERPL-CCNC: 185 U/L (ref 24–120)
ALT SERPL W P-5'-P-CCNC: 32 U/L (ref 0–54)
AMMONIA BLD-SCNC: 37 UMOL/L (ref 9–33)
ANION GAP SERPL CALCULATED.3IONS-SCNC: 3 MMOL/L (ref 4–13)
AST SERPL-CCNC: 46 U/L (ref 7–45)
BILIRUB SERPL-MCNC: 1.9 MG/DL (ref 0.1–1)
BUN BLD-MCNC: 19 MG/DL (ref 5–21)
BUN/CREAT SERPL: 17.8 (ref 7–25)
CALCIUM SPEC-SCNC: 9.4 MG/DL (ref 8.4–10.4)
CHLORIDE SERPL-SCNC: 119 MMOL/L (ref 98–110)
CO2 SERPL-SCNC: 18 MMOL/L (ref 24–31)
CREAT BLD-MCNC: 1.07 MG/DL (ref 0.5–1.4)
DEPRECATED RDW RBC AUTO: 49.3 FL (ref 40–54)
ERYTHROCYTE [DISTWIDTH] IN BLOOD BY AUTOMATED COUNT: 15.5 % (ref 12–15)
GFR SERPL CREATININE-BSD FRML MDRD: 62 ML/MIN/1.73
GLOBULIN UR ELPH-MCNC: 3.5 GM/DL
GLUCOSE BLD-MCNC: 87 MG/DL (ref 70–100)
GLUCOSE BLDC GLUCOMTR-MCNC: 127 MG/DL (ref 70–130)
GLUCOSE BLDC GLUCOMTR-MCNC: 129 MG/DL (ref 70–130)
GLUCOSE BLDC GLUCOMTR-MCNC: 160 MG/DL (ref 70–130)
GLUCOSE BLDC GLUCOMTR-MCNC: 88 MG/DL (ref 70–130)
HBA1C MFR BLD: 5.4 %
HCT VFR BLD AUTO: 28.3 % (ref 37–47)
HGB BLD-MCNC: 9.8 G/DL (ref 12–16)
MCH RBC QN AUTO: 30.3 PG (ref 28–32)
MCHC RBC AUTO-ENTMCNC: 34.6 G/DL (ref 33–36)
MCV RBC AUTO: 87.6 FL (ref 82–98)
PLATELET # BLD AUTO: 72 10*3/MM3 (ref 130–400)
PMV BLD AUTO: 12.7 FL (ref 6–12)
POTASSIUM BLD-SCNC: 4.3 MMOL/L (ref 3.5–5.3)
PROT SERPL-MCNC: 5.7 G/DL (ref 6.3–8.7)
RBC # BLD AUTO: 3.23 10*6/MM3 (ref 4.2–5.4)
SODIUM BLD-SCNC: 140 MMOL/L (ref 135–145)
WBC NRBC COR # BLD: 3.35 10*3/MM3 (ref 4.8–10.8)

## 2019-06-23 PROCEDURE — 85027 COMPLETE CBC AUTOMATED: CPT | Performed by: INTERNAL MEDICINE

## 2019-06-23 PROCEDURE — 80053 COMPREHEN METABOLIC PANEL: CPT | Performed by: INTERNAL MEDICINE

## 2019-06-23 PROCEDURE — 82140 ASSAY OF AMMONIA: CPT | Performed by: INTERNAL MEDICINE

## 2019-06-23 PROCEDURE — 82962 GLUCOSE BLOOD TEST: CPT

## 2019-06-23 PROCEDURE — 63710000001 INSULIN LISPRO (HUMAN) PER 5 UNITS: Performed by: NURSE PRACTITIONER

## 2019-06-23 RX ORDER — LACTULOSE 20 G/30ML
20 SOLUTION ORAL 2 TIMES DAILY
Status: DISCONTINUED | OUTPATIENT
Start: 2019-06-24 | End: 2019-06-24 | Stop reason: HOSPADM

## 2019-06-23 RX ORDER — CARVEDILOL 3.12 MG/1
3.12 TABLET ORAL EVERY 12 HOURS SCHEDULED
Status: DISCONTINUED | OUTPATIENT
Start: 2019-06-23 | End: 2019-06-24 | Stop reason: HOSPADM

## 2019-06-23 RX ORDER — DEXTROSE MONOHYDRATE 25 G/50ML
25 INJECTION, SOLUTION INTRAVENOUS
Status: DISCONTINUED | OUTPATIENT
Start: 2019-06-23 | End: 2019-06-24 | Stop reason: HOSPADM

## 2019-06-23 RX ORDER — LEVOTHYROXINE SODIUM 88 UG/1
88 TABLET ORAL
Status: DISCONTINUED | OUTPATIENT
Start: 2019-06-23 | End: 2019-06-24 | Stop reason: HOSPADM

## 2019-06-23 RX ORDER — LACTULOSE 20 G/30ML
20 SOLUTION ORAL 3 TIMES DAILY
Status: DISCONTINUED | OUTPATIENT
Start: 2019-06-23 | End: 2019-06-23

## 2019-06-23 RX ORDER — NICOTINE POLACRILEX 4 MG
15 LOZENGE BUCCAL
Status: DISCONTINUED | OUTPATIENT
Start: 2019-06-23 | End: 2019-06-24 | Stop reason: HOSPADM

## 2019-06-23 RX ADMIN — INSULIN LISPRO 2 UNITS: 100 INJECTION, SOLUTION INTRAVENOUS; SUBCUTANEOUS at 12:00

## 2019-06-23 RX ADMIN — LACTULOSE 20 G: 20 SOLUTION ORAL at 17:20

## 2019-06-23 RX ADMIN — SODIUM CHLORIDE, PRESERVATIVE FREE 3 ML: 5 INJECTION INTRAVENOUS at 09:34

## 2019-06-23 RX ADMIN — LEVOTHYROXINE SODIUM 88 MCG: 88 TABLET ORAL at 09:03

## 2019-06-23 RX ADMIN — LACTULOSE 20 G: 20 SOLUTION ORAL at 09:02

## 2019-06-23 RX ADMIN — CARVEDILOL 3.12 MG: 3.12 TABLET, FILM COATED ORAL at 09:02

## 2019-06-23 RX ADMIN — SODIUM CHLORIDE, PRESERVATIVE FREE 3 ML: 5 INJECTION INTRAVENOUS at 21:29

## 2019-06-23 RX ADMIN — RIFAXIMIN 550 MG: 550 TABLET ORAL at 21:29

## 2019-06-23 RX ADMIN — RIFAXIMIN 550 MG: 550 TABLET ORAL at 09:02

## 2019-06-23 RX ADMIN — CARVEDILOL 3.12 MG: 3.12 TABLET, FILM COATED ORAL at 21:29

## 2019-06-24 VITALS
RESPIRATION RATE: 16 BRPM | HEIGHT: 68 IN | HEART RATE: 72 BPM | OXYGEN SATURATION: 98 % | WEIGHT: 174 LBS | DIASTOLIC BLOOD PRESSURE: 63 MMHG | TEMPERATURE: 98.4 F | SYSTOLIC BLOOD PRESSURE: 125 MMHG | BODY MASS INDEX: 26.37 KG/M2

## 2019-06-24 LAB
ALBUMIN SERPL-MCNC: 1.9 G/DL (ref 3.5–5)
ALBUMIN/GLOB SERPL: 0.6 G/DL (ref 1.1–2.5)
ALP SERPL-CCNC: 195 U/L (ref 24–120)
ALT SERPL W P-5'-P-CCNC: 34 U/L (ref 0–54)
ANION GAP SERPL CALCULATED.3IONS-SCNC: 2 MMOL/L (ref 4–13)
ANISOCYTOSIS BLD QL: ABNORMAL
AST SERPL-CCNC: 41 U/L (ref 7–45)
BASOPHILS # BLD MANUAL: 0.07 10*3/MM3 (ref 0–0.2)
BASOPHILS NFR BLD AUTO: 2 % (ref 0–2)
BILIRUB SERPL-MCNC: 1.1 MG/DL (ref 0.1–1)
BUN BLD-MCNC: 18 MG/DL (ref 5–21)
BUN/CREAT SERPL: 19.4 (ref 7–25)
CALCIUM SPEC-SCNC: 9.2 MG/DL (ref 8.4–10.4)
CHLORIDE SERPL-SCNC: 115 MMOL/L (ref 98–110)
CO2 SERPL-SCNC: 20 MMOL/L (ref 24–31)
CREAT BLD-MCNC: 0.93 MG/DL (ref 0.5–1.4)
DEPRECATED RDW RBC AUTO: 50.2 FL (ref 40–54)
EOSINOPHIL # BLD MANUAL: 0.1 10*3/MM3 (ref 0–0.7)
EOSINOPHIL NFR BLD MANUAL: 3 % (ref 0–4)
ERYTHROCYTE [DISTWIDTH] IN BLOOD BY AUTOMATED COUNT: 15.5 % (ref 12–15)
GFR SERPL CREATININE-BSD FRML MDRD: 72 ML/MIN/1.73
GIANT PLATELETS: ABNORMAL
GLOBULIN UR ELPH-MCNC: 3.1 GM/DL
GLUCOSE BLD-MCNC: 97 MG/DL (ref 70–100)
GLUCOSE BLDC GLUCOMTR-MCNC: 107 MG/DL (ref 70–130)
HCT VFR BLD AUTO: 27.4 % (ref 37–47)
HGB BLD-MCNC: 9.3 G/DL (ref 12–16)
LYMPHOCYTES # BLD MANUAL: 0.73 10*3/MM3 (ref 0.72–4.86)
LYMPHOCYTES NFR BLD MANUAL: 22.2 % (ref 15–45)
LYMPHOCYTES NFR BLD MANUAL: 6.1 % (ref 4–12)
MACROCYTES BLD QL SMEAR: ABNORMAL
MCH RBC QN AUTO: 30 PG (ref 28–32)
MCHC RBC AUTO-ENTMCNC: 33.9 G/DL (ref 33–36)
MCV RBC AUTO: 88.4 FL (ref 82–98)
METAMYELOCYTES NFR BLD MANUAL: 1 % (ref 0–0)
MONOCYTES # BLD AUTO: 0.2 10*3/MM3 (ref 0.19–1.3)
NEUTROPHILS # BLD AUTO: 2.16 10*3/MM3 (ref 1.87–8.4)
NEUTROPHILS NFR BLD MANUAL: 64.6 % (ref 39–78)
NEUTS BAND NFR BLD MANUAL: 1 % (ref 0–10)
PLATELET # BLD AUTO: 63 10*3/MM3 (ref 130–400)
PMV BLD AUTO: 12.5 FL (ref 6–12)
POTASSIUM BLD-SCNC: 4.2 MMOL/L (ref 3.5–5.3)
PROT SERPL-MCNC: 5 G/DL (ref 6.3–8.7)
RBC # BLD AUTO: 3.1 10*6/MM3 (ref 4.2–5.4)
SMALL PLATELETS BLD QL SMEAR: ABNORMAL
SODIUM BLD-SCNC: 137 MMOL/L (ref 135–145)
WBC MORPH BLD: NORMAL
WBC NRBC COR # BLD: 3.29 10*3/MM3 (ref 4.8–10.8)

## 2019-06-24 PROCEDURE — 80053 COMPREHEN METABOLIC PANEL: CPT | Performed by: NURSE PRACTITIONER

## 2019-06-24 PROCEDURE — 85025 COMPLETE CBC W/AUTO DIFF WBC: CPT | Performed by: NURSE PRACTITIONER

## 2019-06-24 PROCEDURE — 85007 BL SMEAR W/DIFF WBC COUNT: CPT | Performed by: NURSE PRACTITIONER

## 2019-06-24 PROCEDURE — 82962 GLUCOSE BLOOD TEST: CPT

## 2019-06-24 RX ORDER — FUROSEMIDE 40 MG/1
40 TABLET ORAL DAILY
Start: 2019-06-24

## 2019-06-24 RX ADMIN — RIFAXIMIN 550 MG: 550 TABLET ORAL at 08:14

## 2019-06-24 RX ADMIN — LACTULOSE 20 G: 20 SOLUTION ORAL at 08:14

## 2019-06-24 RX ADMIN — SODIUM CHLORIDE, PRESERVATIVE FREE 3 ML: 5 INJECTION INTRAVENOUS at 08:15

## 2019-06-24 RX ADMIN — LEVOTHYROXINE SODIUM 88 MCG: 88 TABLET ORAL at 06:24

## 2019-06-24 RX ADMIN — CARVEDILOL 3.12 MG: 3.12 TABLET, FILM COATED ORAL at 08:14

## 2019-06-25 ENCOUNTER — READMISSION MANAGEMENT (OUTPATIENT)
Dept: CALL CENTER | Facility: HOSPITAL | Age: 69
End: 2019-06-25

## 2019-06-25 NOTE — OUTREACH NOTE
Prep Survey      Responses   Facility patient discharged from?  [place]   Is patient eligible?  Yes   Discharge diagnosis  Hepatic encephalopathy,     Does the patient have one of the following disease processes/diagnoses(primary or secondary)?  Other   Does the patient have Home health ordered?  Yes   What is the Home health agency?   [facility]   Is there a DME ordered?  No   Comments regarding appointments  see AVS   Prep survey completed?  Yes          Charisse Fierro RN

## 2019-06-27 ENCOUNTER — READMISSION MANAGEMENT (OUTPATIENT)
Dept: CALL CENTER | Facility: HOSPITAL | Age: 69
End: 2019-06-27

## 2019-06-27 NOTE — OUTREACH NOTE
Medical Week 1 Survey      Responses   Facility patient discharged from?  [place]   Does the patient have one of the following disease processes/diagnoses(primary or secondary)?  Other   Is there a successful TCM telephone encounter documented?  No   Week 1 attempt successful?  Yes   Call start time  0929   Rescheduled  Rescheduled-pt requested   Call end time  0930   General alerts for this patient  Son, Joseph          Radha Jarrett, RN

## 2019-06-28 ENCOUNTER — READMISSION MANAGEMENT (OUTPATIENT)
Dept: CALL CENTER | Facility: HOSPITAL | Age: 69
End: 2019-06-28

## 2019-06-28 ENCOUNTER — TELEPHONE (OUTPATIENT)
Dept: VASCULAR SURGERY | Facility: CLINIC | Age: 69
End: 2019-06-28

## 2019-06-28 NOTE — OUTREACH NOTE
Medical Week 1 Survey      Responses   Facility patient discharged from?  [place]   Does the patient have one of the following disease processes/diagnoses(primary or secondary)?  Other   Is there a successful TCM telephone encounter documented?  No   Week 1 attempt successful?  Yes   Call start time  1944   Call end time  1955   General alerts for this patient  Joseph Yang   Discharge diagnosis  Hepatic encephalopathy,     Meds reviewed with patient/caregiver?  Yes   Is the patient having any side effects they believe may be caused by any medication additions or changes?  No   Does the patient have all medications ordered at discharge?  Yes   Is the patient taking all medications as directed (includes completed medication regime)?  Yes   Does the patient have a primary care provider?   Yes   Does the patient have an appointment with their PCP within 7 days of discharge?  Yes   Has the patient kept scheduled appointments due by today?  Yes   What is the Home health agency?   [facility]   Has home health visited the patient within 72 hours of discharge?  Yes   Psychosocial issues?  No   Did the patient receive a copy of their discharge instructions?  Yes   Nursing interventions  Reviewed instructions with patient   What is the patient's perception of their health status since discharge?  Improving   Is the patient/caregiver able to teach back signs and symptoms related to disease process for when to call PCP?  Yes   Is the patient/caregiver able to teach back signs and symptoms related to disease process for when to call 911?  Yes   Is the patient/caregiver able to teach back the hierarchy of who to call/visit for symptoms/problems? PCP, Specialist, Home health nurse, Urgent Care, ED, 911  Yes   Additional teach back comments  Long discussion on medications.  She is doing better, but he was concerned about the changes to meds.  Discussed daily weights.   Week 1 call completed?  Yes          Hannah Carrera RN

## 2019-07-02 ENCOUNTER — APPOINTMENT (OUTPATIENT)
Dept: CT IMAGING | Facility: HOSPITAL | Age: 69
End: 2019-07-02

## 2019-07-05 ENCOUNTER — READMISSION MANAGEMENT (OUTPATIENT)
Dept: CALL CENTER | Facility: HOSPITAL | Age: 69
End: 2019-07-05

## 2019-07-05 NOTE — OUTREACH NOTE
Medical Week 2 Survey      Responses   Facility patient discharged from?  [place]   Does the patient have one of the following disease processes/diagnoses(primary or secondary)?  Other   Week 2 attempt successful?  No   Call start time  8074   Revoke  Readmitted          Radha Jarrett RN

## 2019-08-07 PROBLEM — I25.10 CAD (CORONARY ARTERY DISEASE): Status: ACTIVE | Noted: 2019-01-01

## 2019-08-07 PROBLEM — I21.19 ST ELEVATION MYOCARDIAL INFARCTION (STEMI) OF INFERIOR WALL (HCC): Status: ACTIVE | Noted: 2019-01-01

## 2019-08-07 NOTE — ED NOTES
Patient arrived per air evac from [place]. Patient was on the way to dr for chest pain. Patient family had to stop on side of rode and call ems due to patient coding. Ems had to shock patient x2 because of vfib. Upon arrival to er patient is in decerebate posturing.  Patient at first did not respond to pain and now will \"wince\"      Loki Walker RN  08/07/19 1211

## 2019-08-07 NOTE — ED NOTES
Bed: 01-A  Expected date:   Expected time:   Means of arrival:   Comments:  Ayan Ogden RN  08/07/19 3551

## 2019-08-07 NOTE — H&P
[facility]      History and Physical - Emergency - Flow in - Cardiac Arrest at Home and Intubated - Probable Inferior STEMI off EMS EKG          I personally saw the patient and rounded with:  Kari Juarez RN and Dr. Cyn Manuel, on  19      The observations documented in this note, including the assessment and plan are mine              Date of Admission:  2019 12:55 PM    Date of Initially Being Seen / Consultation:  19    Cardiologist:  Edgar Celis MD     Cardiology Attending: [facility] AttendinDank Aburto     PCP:  No primary care provider on file. Reason for Consultation or Admission / Chief Complaint:  Cardiac arrest    SUBJECTIVE AND HISTORY OF PRESENT ILLNESS:    Source of the history:  Patient, family, previous inpatient and outpatient records in [facility] is a 71 y.o. female who presents to [facility] ED  # 1 with symptoms / signs / problem or diagnosis of cardiac arrest.     She is not normal mood and affect, alert and orientated x 3, judgement and insight appear appropriate. If not, reason for lack of orientation was s/p cardiac arrest.      Family present:  Yes: son and , I believe      CONTEXT OF THIS HISTORY OF PRESENT ILLNESS INCLUDE: (IF APPLICABLE):    Presentation:  She  presented with cardiac arrest and probable inferior STEMI. She was at home with her son and she reported had chest discomfort and was clutching her chest.  She appearently arrested and EMS was called. EMS shocked her 3 times unsuccessfully. Bystanders did cpr. The ambulance took her to the [facility] and flew her to [place]. Upon arrival, she is intubated, appears to be posturing and one left hand moves up. Cardiology was asked to see her regarding the cardiac arrest and inferior STEMI and to consider these findings in relationship to possible myocardial infarction.       FEATURES OF THIS HISTORY OF PRESENT ILLNESS INCLUDE:       1. Location: in the presence of Lianet ASHER and Dr. Clifford Storey and find no interval changes since the original History and Physical / Consult as noted written by myself, on 08/07/19     With the constellation of symptoms and these findings, I recommend cardiac catheterization and possibility of percutaneous coronary intervention. I discussed with her  in detail  the risks, benefits and alternatives to this procedure. The risks mentioned to her include but are not limited to:  vascular complications in ~ 3%, stroke <1%, renal dysfunction <5%, myocardial infarction <1%, coronary dissection <1%, need for emergency open heart surgery <1, and death <1% . She appeared to understand, had no questions, and agreed to proceed with this plan. Additionally, there are risks associated with moderate sedation which includes transient drop in blood pressure and need for assisted ventilation. This procedure is scheduled for now.     Daniella Gaston MD

## 2019-08-07 NOTE — ED PROVIDER NOTES
[facility] EMERGENCY DEPT  eMERGENCY dEPARTMENT eNCOUnter      Pt Name: Floretta Felty  MRN: [id number]  [place] 1950  Date of evaluation: 8/7/2019  Provider: Paulette Varela MD    [address] [place] [address]       Chief Complaint   Patient presents with    Code     vfib on scene         HISTORY OF PRESENT ILLNESS   (Location/Symptom, Timing/Onset,Context/Setting, Quality, Duration, Modifying Factors, Severity)  Note limiting factors. Floretta Felty is a 71 y.o. female who presents to the emergency department due to cardiac arrest.  Report is all from EMS at this time. I am told that patient was complaining of chest pain and family was taking her to the ER when she became unresponsive. EMS was called. Report is that patient was in V. fib upon arrival and was shocked 3 times. [facility] was then called. Patient was intubated and brought here. Glucose in the 160s. Patient currently intubated. Patient holding both of her hands in an extended position which almost looked consistent with decerebrate posturing but on sternal rub patient localizes the pain. Has been given no medications for EMS. No family here at this time. Twelve-lead from EMS looks concerning for MI. There is borderline ST elevation in inferior leads with reciprocal ST depression. Soon after arrival call placed to cardiology on call. History is of course very limited due to patient's status and no family here at this time. HPI    NursingNotes were reviewed. REVIEW OF SYSTEMS    (2-9 systems for level 4, 10 or more for level 5)     Review of Systems   Unable to perform ROS: Mental status change       A complete review of systems was performed and is negative except as noted above in the HPI. PAST MEDICAL HISTORY   No past medical history on file. SURGICAL HISTORY     No past surgical history on file.       CURRENT MEDICATIONS       Previous Medications    No medications on file       ALLERGIES     Patient has no allergy information kidney stone and will also probably need urinalysis as son tells me she recently had a UTI. However, no reason to evaluate for these issues at this time as the most pressing issue seems to be cardiac in nature. Case discussed with hospitalist, Dr. Bk Johnson, who will see in consult. Dr. Nora Wan will be admitting the patient and is taking patient emergently to Cath Lab at this time. CONSULTS:  IP CONSULT TO CARDIOLOGY  IP CONSULT TO HOSPITALIST    PROCEDURES:  Unless otherwise notedbelow, none     Procedures    FINAL IMPRESSION     1. Cardiac arrest ([facility] [address]    2. Possible ST elevation myocardial infarction (STEMI), unspecified artery ([facility] [address]    3. History of cirrhosis    4. Hyperkalemia    5.  Elevated serum creatinine          DISPOSITION/PLAN   DISPOSITION Decision To Admit 08/07/2019 02:43:03 PM      PATIENT REFERRED TO:  @FUP@    DISCHARGE MEDICATIONS:  New Prescriptions    No medications on file          (Please note that portions of this note were completed with a voice recognition program.  Efforts were made to edit the dictations butoccasionally words are mis-transcribed.)    Rosalia Dunlap MD (electronically signed)  AttendingEmergency Physician        Rosalia Dunlap MD  08/07/19 0611

## 2019-08-07 NOTE — PROGRESS NOTES
Pt arrived on unit via cath lab and RT. Pt has impella lvad with mechanical ventilation. Pt tolerated transfer. Slight oozing noted at impella site.

## 2019-08-07 NOTE — PROGRESS NOTES
BRIEF OPERATIVE NOTE - CARDIAC CATHETERIZATION REPORT    COMPLETED PRIOR TO THE PATIENT LEAVING THE CARDIAC CATHETERIZATION LABORATORIES AREA        Name of the license independent practitioner preforming the procedure: BO Bello MD, [address] [facility]       Name of Procedure Preformed: Selective left heart and coronary arteriography  Percutaneous coronary intervention to the proximal LAD (bare metal stents x 2)  IMPELLA  Pulmonary artery catheter  (femoral approach)        Description of Procedure: After obtaining informed written consent, the patient was brought to the catheterization laboratory where the right groin was prepared in the usual sterile fashion. 2% lidocaine was injected above the common femoral artery. Utilizing ultrasound guidance, and the Seldinger technique, the common femoral artery was cannulated and bright red pulsatile blood returned. Using fluoroscopy guidance, the J-tip wire was placed in the common femoral artery. A 6-Fr sheath was inserted. Utilizing 6-German Yaquelin catheters, selective coronary arteriography was performed followed by left ventriculography. At this stage utilizing a 6FR FL4 with 2 SH, the proximal LAD was injected. The lesion in the proximal LAD was crossed with a 0.014\" intermediate guide wire. The lesion was then crossed with a 3.0 x 28 mm BMS proximally and a 2.5 x 18 mm BMS more distally . A satisfactory angiography result was obtained. At this stage, the equipment was removed. A right femoral arteriogram was performed. In standard fashion, a left ventricular assist device (Radene Riky) was placed from the right femoral artery. In similar fashion, a pulmonary artery catheter (GeoEye Doll) was placed from the right femoral vein. Hemostasis was achieved. Sterile dressing was applied.   She was taken to her room in critical but stable and satisfactory condition          Findings of the Procedure: Hemodynamics:     Aorta:  140/ 81

## 2019-08-07 NOTE — PROCEDURES
tab on my computer. After obtaining informed written consent, the patient was brought to the catheterization laboratory where the right groin was prepared in the usual sterile fashion. 2% lidocaine was injected above the common femoral artery. Utilizing ultrasound guidance, and the Seldinger technique, the common femoral artery was cannulated and bright red pulsatile blood returned. Using fluoroscopy guidance, the J-tip wire was placed in the common femoral artery. A 6-Fr sheath was inserted. Utilizing 6-Kazakh Yaquelin catheters, selective coronary arteriography was performed followed by left ventriculography. At this stage utilizing a 6FR FL4 with 2 SH, the proximal LAD was injected. The lesion in the proximal LAD was crossed with a 0.014\" intermediate guide wire. The lesion was then crossed with a 3.0 x 28 mm BMS and more distally a 2.5 x 18 mm BMS was placed. A satisfactory angiography result was obtained. At this stage, the equipment was removed. A right femoral arteriogram was performed. In standard fashion, a left ventricular assist device (Terie Jones) was placed from the right femoral artery. In similar fashion, a pulmonary artery catheter (Selinda Solo) was placed from the right femoral vein. Hemostasis was achieved. Sterile dressing was applied. She was taken to her room in critical yet stable condition.    The results of the procedure were explained to the family in the cardiac catheterization laboratories consult / waiting room      Complications:  none    Hemodynamics:    Right Heart Pressures:      Site Pressure mmHg        Right atrium 16 mmHg   Right ventricle 40/13/17 mmHg   Pulmonary artery 35/20 mmHg   Mean pulmonary artery 27 mmHg   Mean pulmonary capillary wedge pressure 18 mmHg           Left Heart Pressures:      Site Pressure mmHg        Left ventricular pressure 142/24 mmHg   Left ventricular end-diastolic pressure (LVEDP) 33 mmHg   Aortic pressure 140/81 mmHg   Mean aortic pressure 110 mmHg         Cardiac Performance      Jay cardiac output 6.57 l / min   Jay cardiac index 3.6 l / min / m2   Pulmonary artery pulsatility index (Amy) 0.9    Cardiac power output () 1.6 W       Note and Definations:    Pulmonary Artery Pulsatility Index (Amy):  (sPAP - dPAP) / RA     < 1 indicative of right heart failure   1 - 1.5  Right heart dysfunction      Cardiac Power Output [facility]):  (MAP x CO) / 451     < 0.6 severe left ventricular dysfunction      Cardiogenic shock:    According to the [place] CSI Treatment Algorithm:    1. Acute myocardial necrosis (STEMI or NSTEMI)  2. Hypotension (<90/60) mmHg  3. Need for inotropes to maintain BP> 90 mmHg systolic  4. LVEDP > 15 mmHg  5. Cardiogenic Index < 2.2 L/min/m2       Saturations      Pulmonary artery saturation 80 %   Aorta saturation 98 %       Valve Areas      Aortic valve area - cm2   Mitral valve area - cm2       Results:    Coronary Arteries:    Left Main Coronary Artery:  A large vessel which arises from the left sinus of Valsalva. It divides into the left anterior descending coronary artery and the left circumflex. There is mild diffuse disease throughout the entire length of the vessel. Left Anterior Coronary Artery:  The LAD is a moderate sized vessel with several diagonal branches. There is a 90% stenosis in the proximal portion of this vessel. Left Circumflex Coronary Artery:  The LCx is a moderate sized vessel with several marginal branches. There is mild diffuse disease throughout the entire length of the vessel. Right Coronary Artery:  The RCA is a moderate sized dominant vessel which arises from the right sinus of Valsalva. There is mild diffuse disease throughout the entire length of the vessel. Left Ventriculogram:  The left ventriculogram is obtained in the right oblique projection. There is an anterior apical and inferior aneurysm. All other regional wall segments move appropriately.  The estimated visual ejection fraction is 20%. There is no mitral regurgitation or pull back gradient seen across the aortic valve. Percutaneous Coronary Intervention:    The initial 90% stenosis in the proximal LAD was reduced to 0%. BLANCHE-3 flow was maintained throughout. Summary:      1. Successful femoral artery ultrasound  2. Successful femoral artery arterogram  3. Single vessel coronary artery disease involving the proximal LAD  4. Left ventricular function is impaired in the anterior apical and inferior segment with a visually estimate ejection of 20%. These segments are aneurysmal.    5.  90% lesion in the proximal LAD  6. Successful percutaneous coronary intervention utilizing two bare metal stents to the proximal and mid LAD. 7.  Cardiogenic shock as defined      A. Acute myocardial necrosis (STEMI or NSTEMI)   B. LVEDP > 15 mmHg    8. Successful placement of a left ventricular assist device (IMPELLA) from the right femoral artery  9. Successful placement of a pulmonary artery catheter Aaron Niece - Eladio) from the right femoral vein. RECOMMENDATIONS:    1. Risk Factor Modification  2. On-going Medical Therapy  3. Dual antiplatelet therapy for at least one month.   4.  Maximal support      Electronically signed by Kody Contreras MD on 8/7/19 at 6:55 PM

## 2019-08-08 PROBLEM — Z51.5 PALLIATIVE CARE PATIENT: Status: ACTIVE | Noted: 2019-01-01

## 2019-08-08 NOTE — PROGRESS NOTES
Hospitalist Progress Note    Patient:  Silva Castro  YOB: 1950  Date of Service: 8/8/2019  MRN: [id number]   Acct: [de-identified]   Primary Care Physician: No primary care provider on file. Advance Directive: Full Code  Admit Date: 8/7/2019       Hospital Day: 1  Referring Provider: Mike Vega MD    Patient Seen, Chart, Consults, Notes, Labs, Radiology studies reviewed. Subjective: Silva Castro is a 71 y.o. female  whom we are following for out of hospital cardiac arrest, STEMI, cardiogenic shock. She made it through the night. Her hemoglobin dropped from 11.5 to 6.0 g.  In discussing with the nurse overnight, there was some oozing from her groin however nowhere near enough to account for the significant drop in her hemoglobin. On examination, her abdomen is not distended. Her flanks are not distended nor bruised. She is not getting any bloody drainage from her OG decompression. At least on exam, I do not think that she has a retroperitoneal hemorrhage. Unfortunately, I feel that she is too unstable for CT scan at this point. She has been transfused 2 units of packed cells. Follow-up H&H is pending. She was started on broad-spectrum antibiotics and an insulin infusion overnight as well. She is making urine. Her creatinine has remained stable. Her acidosis is also improving. She remains critically ill. Allergies:  Patient has no known allergies.     Medicines:  Current Facility-Administered Medications   Medication Dose Route Frequency Provider Last Rate Last Dose    0.9 % sodium chloride bolus  250 mL Intravenous Once Sasha Panda MD 20 mL/hr at 08/08/19 0307 250 mL at 08/08/19 0307    insulin regular (HUMULIN R;NOVOLIN R) 100 Units in sodium chloride 0.9 % 100 mL infusion  0.5 Units/hr Intravenous Continuous Dallas Altamirano MD 13.7 mL/hr at 08/08/19 0604 13.65 Units/hr at 08/08/19 0604    glucose (GLUTOSE) 40 % oral gel 15 g  15 g Oral PRN MD Catherine Ayers

## 2019-08-08 NOTE — PROGRESS NOTES
Spoke with Rex Dailey from [place]. States they will follow.  Electronically signed by Jayy Helm RN on 8/7/2019 at 11:09 PM

## 2019-08-08 NOTE — PROGRESS NOTES
Dr. Sivakumar Hewitt at bedside. Updated on pt's current condition. VSS. Will continue to monitor.  Electronically signed by Charisse Bnod RN on 8/8/2019 at 12:35 AM

## 2019-08-08 NOTE — PROGRESS NOTES
Called to see patient regarding persistent oozing around Impella site. Nursing has held pressure for hours and still breakthrough bleeding. I injected 20 cc of lidocaine with 2% epi. The site was built up with roll gauze and a pressure dressing was put in place. I liter bag of saline was secured on top for additional pressure. She has also had a drop in her platelet count. I am concerned she may be developing DIC. I will send serologies for DIC.

## 2019-08-08 NOTE — SIGNIFICANT EVENT
Called by the nurse, labs revealed 5 grams dropped on hemoglobin, ptt 200, and still hyperkalemia. Wbc 30k  bp low  I advised transfuse 2 units prbc, insulin gtt will help hyperglycemia and hypokalemia. Cardiology needs to be informed on current situation for further recommendations.

## 2019-08-08 NOTE — PROGRESS NOTES
Steph Brown MD   10 mL at 08/08/19 4603    sodium chloride flush 0.9 % injection 10 mL  10 mL Intravenous PRN Ramona Adame MD        acetaminophen (TYLENOL) tablet 650 mg  650 mg Oral Q4H PRN Ramona Adame MD        magnesium hydroxide (MILK OF MAGNESIA) 400 MG/5ML suspension 30 mL  30 mL Oral Daily PRN Ramona Adame MD        ondansetron [facility]) injection 4 mg  4 mg Intravenous Q6H PRN Ramona Adame MD        metoprolol (LOPRESSOR) injection 5 mg  5 mg Intravenous Q6H Ramona Adame MD   5 mg at 08/08/19 1515    enalaprilat (VASOTEC) injection 1.25 mg  1.25 mg Intravenous 4 times per day Ramona Adame MD   1.25 mg at 08/08/19 1159    heparin 25,000 units in dextrose 5% 250 mL infusion  12 Units/kg/hr Intravenous Continuous Ramona Adame MD   Stopped at 08/08/19 0033    heparin (porcine) injection 2,210 Units  30 Units/kg Intravenous PRN Ramona Adame MD        heparin (porcine) injection 4,410 Units  60 Units/kg Intravenous PRN Ramona Adame MD        0.9 % sodium chloride bolus  500 mL Intravenous Once Ramona Adame  mL/hr at 08/07/19 2300       Allergies: Patient has no known allergies. Past Medical History:   Diagnosis Date    Palliative care patient 08/08/2019     No past surgical history on file. No family history on file.   Social History     Tobacco Use    Smoking status: Not on file   Substance Use Topics    Alcohol use: Not on file          Review of Systems:    General:      Complaint / Symptom Yes / No / Description if Yes       Fatigue Yes:  chronic   Weight gain NA   Insomnia NA       Respiratory:        Complaint / Symptom Yes / No / Description if Yes       Cough No   Horseness NA       Cardiovascular:    Complaint / Symptom Yes / No / Description if Yes       Chest Pain N/A   Shortness of Air / Orthopnea Yes: chronic and remains intubated   Presyncope / Syncope No   Palpitations No         Objective:    BP (!) 84/58   Pulse 72   Temp 97.3 °F (36.3 °C)   Resp 14   Ht may or may not actually correct the underlying condition.  This is a highly complex scenario and may result in an adverse outcome despite our best efforts and intentions.  All conditions are not curable in spite of the correct interventions and best possible therapies.         Yes: emergency cardiac catheterization Continue current medications:      N/A                 2. CAD Initial presentation during this evaluation    S/p stent to the LAD  N/A Continue current medications:     N/A                 3. Large drop in hemoglobin Initial presentation during this evaluation I suspect a retroperitoneal hematoma. Discussed with vascular, too unsteady for CT scan and cannot have an operation anyway. Will transfuse No Continue current medications:        yes         4. Has cirrhosis to begin with with an admission INR of 1.6, on heparin for IMPELLA, will dc the ASA and effient due to bleeding albeit this does represent a risk for stent thrombosis      CONSIDERATIONS, THOUGHTS, AND PLANS:    1. Continue present medications except for changes as noted above  2. Continue to monitor rhythm  3. Further orders per clinical course. 4. Dc the ASA and effient  5. I had a long discussion with her , I am gravely concerned regarding her short term prognosis. He wants to see \"how it goes\" over night. Discussed with spouse and nursing.     Electronically signed by Amna Yang MD on 8/8/19        [id number] [facility]

## 2019-08-09 VITALS
RESPIRATION RATE: 19 BRPM | SYSTOLIC BLOOD PRESSURE: 67 MMHG | TEMPERATURE: 96.5 F | OXYGEN SATURATION: 100 % | WEIGHT: 165.4 LBS | DIASTOLIC BLOOD PRESSURE: 33 MMHG | HEART RATE: 83 BPM | HEIGHT: 66 IN | BODY MASS INDEX: 26.58 KG/M2

## 2019-08-09 LAB
BLOOD BANK DISPENSE STATUS: NORMAL
BLOOD BANK PRODUCT CODE: NORMAL
BPU ID: NORMAL
C DIFF TOXIN/ANTIGEN: NORMAL
DESCRIPTION BLOOD BANK: NORMAL

## 2019-08-09 NOTE — PROGRESS NOTES
Patient's  came and said he wanted the ventilator turned off and did not want to continue prolonging things. Spoke with  and he is agreeable. Also notified Ravi Kelsea as he is on her case as well. Respiratory was called and patient was extubated at 2237. Family is at bedside.    Electronically signed by Joao Gutierrez RN on 8/8/2019 at 10:49 PM

## 2019-08-12 LAB
BLOOD CULTURE, ROUTINE: NORMAL
CULTURE, BLOOD 2: NORMAL

## (undated) DEVICE — GLV SURG SENSICARE W/ALOE PF LF 7.5 STRL

## (undated) DEVICE — DRSNG SURESITE WNDW 4X4.5

## (undated) DEVICE — SYR LL TP 10ML STRL

## (undated) DEVICE — MYNX ACE VASCULAR CLOSURE DEVICE (6F/7F): Brand: MYNX ACE VASCULAR CLOSURE DEVICE (6F/7F)

## (undated) DEVICE — ST MIC/INTRO ACC SHRP/NDL TUNG/TP NITNL 5F 45CM 7CM

## (undated) DEVICE — RADIFOCUS GLIDEWIRE ADVANTAGE GUIDEWIRE: Brand: GLIDEWIRE ADVANTAGE

## (undated) DEVICE — ARMADA 35 PTA CATHETER 5 MM X 40 MM X 80 CM / OVER-THE-WIRE: Brand: ARMADA

## (undated) DEVICE — NAVICROSS SUPPORT CATHETER: Brand: NAVICROSS

## (undated) DEVICE — PAD ENDOVASCULAR: Brand: MEDLINE INDUSTRIES, INC.

## (undated) DEVICE — Device: Brand: ASAHI GLADIUS18

## (undated) DEVICE — SHEATH STEER INTRO TOURGUIDE 6.5F 55CM 9MM

## (undated) DEVICE — BG BANDED WRUBBER BAND AND TP 36X54IN

## (undated) DEVICE — PK TURNOVER RM ADV

## (undated) DEVICE — PROXIMATE RH ROTATING HEAD SKIN STAPLERS (35 WIDE) CONTAINS 35 STAINLESS STEEL STAPLES: Brand: PROXIMATE

## (undated) DEVICE — BNDG ELAS CO-FLEX SLF ADHR 6IN 5YD LF STRL

## (undated) DEVICE — PINNACLE INTRODUCER SHEATH: Brand: PINNACLE